# Patient Record
Sex: FEMALE | Race: BLACK OR AFRICAN AMERICAN | Employment: OTHER | URBAN - METROPOLITAN AREA
[De-identification: names, ages, dates, MRNs, and addresses within clinical notes are randomized per-mention and may not be internally consistent; named-entity substitution may affect disease eponyms.]

---

## 2020-12-01 ENCOUNTER — APPOINTMENT (OUTPATIENT)
Dept: GENERAL RADIOLOGY | Age: 75
DRG: 291 | End: 2020-12-01
Attending: EMERGENCY MEDICINE
Payer: MEDICARE

## 2020-12-01 ENCOUNTER — HOSPITAL ENCOUNTER (INPATIENT)
Age: 75
LOS: 9 days | Discharge: NURSING, REHAB OR LONG-TERM CARE | DRG: 291 | End: 2020-12-10
Attending: EMERGENCY MEDICINE | Admitting: INTERNAL MEDICINE
Payer: MEDICARE

## 2020-12-01 ENCOUNTER — APPOINTMENT (OUTPATIENT)
Dept: MRI IMAGING | Age: 75
DRG: 291 | End: 2020-12-01
Attending: INTERNAL MEDICINE
Payer: MEDICARE

## 2020-12-01 DIAGNOSIS — I16.0 HYPERTENSIVE URGENCY: Primary | ICD-10-CM

## 2020-12-01 DIAGNOSIS — I42.0 DILATED CARDIOMYOPATHY (HCC): ICD-10-CM

## 2020-12-01 DIAGNOSIS — R06.02 SOB (SHORTNESS OF BREATH): ICD-10-CM

## 2020-12-01 DIAGNOSIS — S92.505A CLOSED NONDISPLACED FRACTURE OF PHALANX OF LESSER TOE OF LEFT FOOT, UNSPECIFIED PHALANX, INITIAL ENCOUNTER: ICD-10-CM

## 2020-12-01 DIAGNOSIS — E87.70 HYPERVOLEMIA, UNSPECIFIED HYPERVOLEMIA TYPE: ICD-10-CM

## 2020-12-01 PROBLEM — L03.90 CELLULITIS: Status: ACTIVE | Noted: 2020-12-01

## 2020-12-01 LAB
ALBUMIN SERPL-MCNC: 3.2 G/DL (ref 3.5–5)
ALBUMIN/GLOB SERPL: 0.6 {RATIO} (ref 1.1–2.2)
ALP SERPL-CCNC: 108 U/L (ref 45–117)
ALT SERPL-CCNC: 19 U/L (ref 12–78)
ANION GAP SERPL CALC-SCNC: 7 MMOL/L (ref 5–15)
AST SERPL-CCNC: 18 U/L (ref 15–37)
ATRIAL RATE: 105 BPM
BASOPHILS # BLD: 0.1 K/UL (ref 0–0.1)
BASOPHILS NFR BLD: 1 % (ref 0–1)
BILIRUB SERPL-MCNC: 0.8 MG/DL (ref 0.2–1)
BNP SERPL-MCNC: 6517 PG/ML
BUN SERPL-MCNC: 24 MG/DL (ref 6–20)
BUN/CREAT SERPL: 18 (ref 12–20)
CALCIUM SERPL-MCNC: 8.6 MG/DL (ref 8.5–10.1)
CALCULATED P AXIS, ECG09: 36 DEGREES
CALCULATED R AXIS, ECG10: 3 DEGREES
CALCULATED T AXIS, ECG11: -105 DEGREES
CHLORIDE SERPL-SCNC: 103 MMOL/L (ref 97–108)
CO2 SERPL-SCNC: 30 MMOL/L (ref 21–32)
CREAT SERPL-MCNC: 1.35 MG/DL (ref 0.55–1.02)
DIAGNOSIS, 93000: NORMAL
DIFFERENTIAL METHOD BLD: ABNORMAL
EOSINOPHIL # BLD: 0.1 K/UL (ref 0–0.4)
EOSINOPHIL NFR BLD: 1 % (ref 0–7)
ERYTHROCYTE [DISTWIDTH] IN BLOOD BY AUTOMATED COUNT: 14.3 % (ref 11.5–14.5)
GLOBULIN SER CALC-MCNC: 5.1 G/DL (ref 2–4)
GLUCOSE SERPL-MCNC: 135 MG/DL (ref 65–100)
HCT VFR BLD AUTO: 33.2 % (ref 35–47)
HGB BLD-MCNC: 10.5 G/DL (ref 11.5–16)
IMM GRANULOCYTES # BLD AUTO: 0 K/UL (ref 0–0.04)
IMM GRANULOCYTES NFR BLD AUTO: 0 % (ref 0–0.5)
LYMPHOCYTES # BLD: 2 K/UL (ref 0.8–3.5)
LYMPHOCYTES NFR BLD: 20 % (ref 12–49)
MCH RBC QN AUTO: 28.8 PG (ref 26–34)
MCHC RBC AUTO-ENTMCNC: 31.6 G/DL (ref 30–36.5)
MCV RBC AUTO: 91 FL (ref 80–99)
MONOCYTES # BLD: 0.7 K/UL (ref 0–1)
MONOCYTES NFR BLD: 8 % (ref 5–13)
NEUTS SEG # BLD: 7 K/UL (ref 1.8–8)
NEUTS SEG NFR BLD: 71 % (ref 32–75)
NRBC # BLD: 0 K/UL (ref 0–0.01)
NRBC BLD-RTO: 0 PER 100 WBC
P-R INTERVAL, ECG05: 150 MS
PLATELET # BLD AUTO: 156 K/UL (ref 150–400)
POTASSIUM SERPL-SCNC: 2.8 MMOL/L (ref 3.5–5.1)
PROT SERPL-MCNC: 8.3 G/DL (ref 6.4–8.2)
Q-T INTERVAL, ECG07: 374 MS
QRS DURATION, ECG06: 90 MS
QTC CALCULATION (BEZET), ECG08: 494 MS
RBC # BLD AUTO: 3.65 M/UL (ref 3.8–5.2)
SODIUM SERPL-SCNC: 140 MMOL/L (ref 136–145)
TROPONIN I SERPL-MCNC: <0.05 NG/ML
VENTRICULAR RATE, ECG03: 105 BPM
WBC # BLD AUTO: 9.9 K/UL (ref 3.6–11)

## 2020-12-01 PROCEDURE — 74011250637 HC RX REV CODE- 250/637: Performed by: INTERNAL MEDICINE

## 2020-12-01 PROCEDURE — 96374 THER/PROPH/DIAG INJ IV PUSH: CPT

## 2020-12-01 PROCEDURE — 83880 ASSAY OF NATRIURETIC PEPTIDE: CPT

## 2020-12-01 PROCEDURE — 93005 ELECTROCARDIOGRAM TRACING: CPT

## 2020-12-01 PROCEDURE — 84484 ASSAY OF TROPONIN QUANT: CPT

## 2020-12-01 PROCEDURE — 74011250636 HC RX REV CODE- 250/636: Performed by: EMERGENCY MEDICINE

## 2020-12-01 PROCEDURE — 96375 TX/PRO/DX INJ NEW DRUG ADDON: CPT

## 2020-12-01 PROCEDURE — 80053 COMPREHEN METABOLIC PANEL: CPT

## 2020-12-01 PROCEDURE — 65660000000 HC RM CCU STEPDOWN

## 2020-12-01 PROCEDURE — 74011000258 HC RX REV CODE- 258: Performed by: INTERNAL MEDICINE

## 2020-12-01 PROCEDURE — 74011250637 HC RX REV CODE- 250/637: Performed by: EMERGENCY MEDICINE

## 2020-12-01 PROCEDURE — 71045 X-RAY EXAM CHEST 1 VIEW: CPT

## 2020-12-01 PROCEDURE — 85025 COMPLETE CBC W/AUTO DIFF WBC: CPT

## 2020-12-01 PROCEDURE — 74011250636 HC RX REV CODE- 250/636: Performed by: INTERNAL MEDICINE

## 2020-12-01 PROCEDURE — 73630 X-RAY EXAM OF FOOT: CPT

## 2020-12-01 PROCEDURE — 74011000258 HC RX REV CODE- 258: Performed by: EMERGENCY MEDICINE

## 2020-12-01 PROCEDURE — 99285 EMERGENCY DEPT VISIT HI MDM: CPT

## 2020-12-01 PROCEDURE — 36415 COLL VENOUS BLD VENIPUNCTURE: CPT

## 2020-12-01 PROCEDURE — A9575 INJ GADOTERATE MEGLUMI 0.1ML: HCPCS | Performed by: INTERNAL MEDICINE

## 2020-12-01 PROCEDURE — 74011000250 HC RX REV CODE- 250: Performed by: EMERGENCY MEDICINE

## 2020-12-01 PROCEDURE — 73720 MRI LWR EXTREMITY W/O&W/DYE: CPT

## 2020-12-01 RX ORDER — CLONIDINE HYDROCHLORIDE 0.1 MG/1
0.2 TABLET ORAL 2 TIMES DAILY
Status: DISCONTINUED | OUTPATIENT
Start: 2020-12-01 | End: 2020-12-03

## 2020-12-01 RX ORDER — POTASSIUM CHLORIDE 20 MEQ/1
20 TABLET, EXTENDED RELEASE ORAL DAILY
Status: DISCONTINUED | OUTPATIENT
Start: 2020-12-02 | End: 2020-12-10 | Stop reason: HOSPADM

## 2020-12-01 RX ORDER — ACETAMINOPHEN 325 MG/1
650 TABLET ORAL
Status: DISCONTINUED | OUTPATIENT
Start: 2020-12-01 | End: 2020-12-10 | Stop reason: HOSPADM

## 2020-12-01 RX ORDER — POLYETHYLENE GLYCOL 3350 17 G/17G
17 POWDER, FOR SOLUTION ORAL DAILY PRN
Status: DISCONTINUED | OUTPATIENT
Start: 2020-12-01 | End: 2020-12-10 | Stop reason: HOSPADM

## 2020-12-01 RX ORDER — GADOTERATE MEGLUMINE 376.9 MG/ML
11 INJECTION INTRAVENOUS
Status: COMPLETED | OUTPATIENT
Start: 2020-12-01 | End: 2020-12-01

## 2020-12-01 RX ORDER — SODIUM CHLORIDE 0.9 % (FLUSH) 0.9 %
5-40 SYRINGE (ML) INJECTION AS NEEDED
Status: DISCONTINUED | OUTPATIENT
Start: 2020-12-01 | End: 2020-12-10 | Stop reason: HOSPADM

## 2020-12-01 RX ORDER — ONDANSETRON 2 MG/ML
4 INJECTION INTRAMUSCULAR; INTRAVENOUS
Status: DISCONTINUED | OUTPATIENT
Start: 2020-12-01 | End: 2020-12-10 | Stop reason: HOSPADM

## 2020-12-01 RX ORDER — HEPARIN SODIUM 5000 [USP'U]/ML
5000 INJECTION, SOLUTION INTRAVENOUS; SUBCUTANEOUS EVERY 12 HOURS
Status: DISCONTINUED | OUTPATIENT
Start: 2020-12-01 | End: 2020-12-10 | Stop reason: HOSPADM

## 2020-12-01 RX ORDER — ACETAMINOPHEN 325 MG/1
650 TABLET ORAL
Status: DISCONTINUED | OUTPATIENT
Start: 2020-12-01 | End: 2020-12-04

## 2020-12-01 RX ORDER — LABETALOL HYDROCHLORIDE 5 MG/ML
20 INJECTION, SOLUTION INTRAVENOUS
Status: DISCONTINUED | OUTPATIENT
Start: 2020-12-01 | End: 2020-12-01

## 2020-12-01 RX ORDER — FUROSEMIDE 10 MG/ML
40 INJECTION INTRAMUSCULAR; INTRAVENOUS DAILY
Status: DISCONTINUED | OUTPATIENT
Start: 2020-12-02 | End: 2020-12-03

## 2020-12-01 RX ORDER — POTASSIUM CHLORIDE 20 MEQ/1
40 TABLET, EXTENDED RELEASE ORAL
Status: COMPLETED | OUTPATIENT
Start: 2020-12-01 | End: 2020-12-01

## 2020-12-01 RX ORDER — HYDRALAZINE HYDROCHLORIDE 20 MG/ML
10 INJECTION INTRAMUSCULAR; INTRAVENOUS
Status: DISCONTINUED | OUTPATIENT
Start: 2020-12-01 | End: 2020-12-03

## 2020-12-01 RX ORDER — LABETALOL HYDROCHLORIDE 5 MG/ML
10 INJECTION, SOLUTION INTRAVENOUS
Status: COMPLETED | OUTPATIENT
Start: 2020-12-01 | End: 2020-12-01

## 2020-12-01 RX ORDER — METRONIDAZOLE 500 MG/100ML
500 INJECTION, SOLUTION INTRAVENOUS EVERY 12 HOURS
Status: DISCONTINUED | OUTPATIENT
Start: 2020-12-01 | End: 2020-12-06

## 2020-12-01 RX ORDER — SODIUM CHLORIDE 0.9 % (FLUSH) 0.9 %
5-40 SYRINGE (ML) INJECTION EVERY 8 HOURS
Status: DISCONTINUED | OUTPATIENT
Start: 2020-12-01 | End: 2020-12-10 | Stop reason: HOSPADM

## 2020-12-01 RX ADMIN — METRONIDAZOLE 500 MG: 500 INJECTION, SOLUTION INTRAVENOUS at 23:57

## 2020-12-01 RX ADMIN — LABETALOL HYDROCHLORIDE 10 MG: 5 INJECTION INTRAVENOUS at 12:41

## 2020-12-01 RX ADMIN — HYDRALAZINE HYDROCHLORIDE 10 MG: 20 INJECTION INTRAMUSCULAR; INTRAVENOUS at 18:42

## 2020-12-01 RX ADMIN — GADOTERATE MEGLUMINE 11 ML: 376.9 INJECTION INTRAVENOUS at 21:31

## 2020-12-01 RX ADMIN — POTASSIUM CHLORIDE 40 MEQ: 20 TABLET, EXTENDED RELEASE ORAL at 14:57

## 2020-12-01 RX ADMIN — ACETAMINOPHEN 650 MG: 325 TABLET ORAL at 17:45

## 2020-12-01 RX ADMIN — CLONIDINE HYDROCHLORIDE 0.2 MG: 0.1 TABLET ORAL at 17:40

## 2020-12-01 RX ADMIN — CEFAZOLIN SODIUM 1 G: 1 INJECTION, POWDER, FOR SOLUTION INTRAMUSCULAR; INTRAVENOUS at 14:57

## 2020-12-01 RX ADMIN — CEFEPIME HYDROCHLORIDE 2 G: 2 INJECTION, POWDER, FOR SOLUTION INTRAVENOUS at 17:47

## 2020-12-01 RX ADMIN — HEPARIN SODIUM 5000 UNITS: 5000 INJECTION INTRAVENOUS; SUBCUTANEOUS at 22:50

## 2020-12-01 RX ADMIN — Medication 10 ML: at 22:50

## 2020-12-01 NOTE — H&P
Hospitalist Admission Note    NAME: Gelacio Vidal   :  1945   MRN:  [id number]     Date/Time:  2020 4:29 PM    Patient PCP: None  ______________________________________________________________________  Given the patient's current clinical presentation, I have a high level of concern for decompensation if discharged from the emergency department. Complex decision making was performed, which includes reviewing the patient's available past medical records, laboratory results, and x-ray films. My assessment of this patient's clinical condition and my plan of care is as follows. Assessment / Plan:  Bilateral extremity purulent cellulitis  -Admit to telemetry  -Received a dose of cefazolin in the ED.   Start broad-spectrum coverage with vancomycin, cefepime, metronidazole  -Left foot x-ray shows nondisplaced left second proximal phalanx fracture in the toe has blue/black discoloration  -No radiographic evidence of osteomyelitis but will order an MRI of the foot and asked for podiatry consult  -We will also ask wound care to see to help address the draining wound on her Rt foot  -check A1C, check CARISSA    SOB/volume overload  -not on home O2  -denies history of tobacco use or known lung disease  -CXR Rt pleural effusion  -BNP > 6K  -start daily lasix and add potassium supplement  -daily weights, strict I/O  -denies any chest pain    Accelerated HTN  -initial /109  -improved after IV labetalol but still significantly elevated with DBP > 100  -start clonidine, prn hydralazine  -follow BP trend, may need another agent for optimal control    hypokalemia  -given 40meq of potassium in ED for initial K of 2.8  -recheck AM labs    TONJA vs CKD  -Cr 1.35, unknown baseline  -recheck AM labs  -check A1C as above  -will hold off on IV for now given b/l LE edema    Code Status: Full  Surrogate Decision Maker:    DVT Prophylaxis: heparin  GI Prophylaxis: not indicated    Baseline: lives at home with her 2 sons. Independent with ADLs      Subjective:   CHIEF COMPLAINT: bilateral LE swelling, draining Rt foot wound, b/l foot pain. HISTORY OF PRESENT ILLNESS:     Zena Mclain is a 66 y.o. is pleasant -American female with no known past medical history aside from recent bilateral lower extremity cellulitis treated [facility] several weeks ago, who presents to the ED today with bilateral lower extremity swelling, and an ulcer with purulent drainage from her right foot. She was initially seen at an urgent care facility and was then referred to the ED. She does have pain in left foot near the site of a draining ulcer on the dorsum of her forefoot. She reports she was in the hospital for several days at [facility] a few weeks ago during which time she was treated with IV antibiotics which greatly improved bilateral lower extremity cellulitis she was experiencing at the time. However, patient notes that over the last few weeks swelling in her legs has recurred with a draining wound in the right foot and skin breakdown on both extremities. Her systolic blood pressure is greater than 240 on presentation. BP improved but still elevated at 171/105 after IV labetalol in the ED    X-ray of the left foot in the ED reveals nondisplaced fracture of the left second proximal phalanx distally with no plain film evidence for osteomyelitis. Moderate soft tissue swelling compatible with cellulitis is noted. Patient denies fever/chills or nausea/vomiting. The patient lives at home with her 2 sons and is independent in her ADLs. She is retired as a  at [employer]. We were asked to admit for work up and evaluation of the above problems.      Denies past medical history    Denies past surgical history    Social History     Tobacco Use    Smoking status: Not on file   Substance Use Topics    Alcohol use: Not on file        Family History   Problem Relation Age of Onset    Diabetes Mother     Diabetes Father     Diabetes Son        Prior to Admission medications    Not on File       REVIEW OF SYSTEMS:     I am not able to complete the review of systems because: The patient is intubated and sedated    The patient has altered mental status due to his acute medical problems    The patient has baseline aphasia from prior stroke(s)    The patient has baseline dementia and is not reliable historian    The patient is in acute medical distress and unable to provide information           Total of 12 systems reviewed as follows:       POSITIVE= underlined text  Negative = text not underlined  General:  fever, chills, sweats, generalized weakness, weight loss/gain,      loss of appetite   Eyes:    blurred vision, eye pain, loss of vision, double vision  ENT:    rhinorrhea, pharyngitis   Respiratory:   cough, sputum production, SOB, LACY, wheezing, pleuritic pain   Cardiology:   chest pain, palpitations, orthopnea, PND, edema, syncope   Gastrointestinal:  abdominal pain , N/V, diarrhea, dysphagia, constipation, bleeding   Genitourinary:  frequency, urgency, dysuria, hematuria, incontinence   Muskuloskeletal :  arthralgia, myalgia, back pain  Hematology:  easy bruising, nose or gum bleeding, lymphadenopathy   Dermatological: rash, ulceration, pruritis, color change / jaundice  Endocrine:   hot flashes or polydipsia   Neurological:  headache, dizziness, confusion, focal weakness, paresthesia,     Speech difficulties, memory loss, gait difficulty  Psychological: Feelings of anxiety, depression, agitation    Objective:   VITALS:    Visit Vitals  BP (!) 187/93   Pulse 79   Temp 98.5 °F (36.9 °C)   Resp 27   Ht 5' 4\" (1.626 m)   Wt 52.2 kg (115 lb)   SpO2 94%   BMI 19.74 kg/m²       PHYSICAL EXAM:    General:    Alert, cooperative, no distress, appears stated age.      HEENT: Atraumatic, anicteric sclerae, pink conjunctivae     No oral ulcers, mucosa moist, throat clear, dentition fair  Neck:  Supple, symmetrical,  thyroid: non tender  Lungs:   Clear to auscultation bilaterally. No Wheezing or Rhonchi. No rales. Chest wall:  No tenderness  No Accessory muscle use. Heart:   Regular  rhythm,  No  murmur   No edema  Abdomen:   Soft, non-tender. Not distended. Bowel sounds normal  Extremities: No cyanosis. No clubbing,      Skin turgor normal, Capillary refill normal, Radial dial pulse 2+  Skin:     Not pale. Not Jaundiced. B/l lower extremity discoloration and dry skin. Ulcerated lesion Rt dorsal forefoot. Blue/black discoloration of Lt 2nd toe  Psych:  Good insight. Not depressed. Not anxious or agitated. Neurologic: EOMs intact. No facial asymmetry. No aphasia or slurred speech. Symmetrical strength, Sensation grossly intact. Alert and oriented X 4. Media Information      Document Information         _______________________________________________________________________  Care Plan discussed with:    Comments   Patient x    Family      RN     Care Manager                    Consultant:  donald WALLACE MD   _______________________________________________________________________  Expected  Disposition:   Home with Family x   HH/PT/OT/RN x   SNF/LTC    NORMAN    ________________________________________________________________________  TOTAL TIME: 76 Minutes    Critical Care Provided     Minutes non procedure based      Comments     Reviewed previous records   >50% of visit spent in counseling and coordination of care  Discussion with patient and/or family and questions answered       ________________________________________________________________________  Signed: Nubia Pendleton DO    Procedures: see electronic medical records for all procedures/Xrays and details which were not copied into this note but were reviewed prior to creation of Plan.     LAB DATA REVIEWED:    Recent Results (from the past 24 hour(s))   EKG, 12 LEAD, INITIAL    Collection Time: 12/01/20 11:26 AM   Result Value Ref Range    Ventricular Rate 105 BPM Atrial Rate 105 BPM    P-R Interval 150 ms    QRS Duration 90 ms    Q-T Interval 374 ms    QTC Calculation (Bezet) 494 ms    Calculated P Axis 36 degrees    Calculated R Axis 3 degrees    Calculated T Axis -105 degrees    Diagnosis       Sinus tachycardia  Possible Left atrial enlargement  Left ventricular hypertrophy  ST & T wave abnormality, consider lateral ischemia  No previous ECGs available  Confirmed by Tanesha Peterson MD, Enma Krause ([id number]) on 12/1/2020 2:00:00 PM     CBC WITH AUTOMATED DIFF    Collection Time: 12/01/20 11:30 AM   Result Value Ref Range    WBC 9.9 3.6 - 11.0 K/uL    RBC 3.65 (L) 3.80 - 5.20 M/uL    HGB 10.5 (L) 11.5 - 16.0 g/dL    HCT 33.2 (L) 35.0 - 47.0 %    MCV 91.0 80.0 - 99.0 FL    MCH 28.8 26.0 - 34.0 PG    MCHC 31.6 30.0 - 36.5 g/dL    RDW 14.3 11.5 - 14.5 %    PLATELET 932 666 - 284 K/uL    NRBC 0.0 0  WBC    ABSOLUTE NRBC 0.00 0.00 - 0.01 K/uL    NEUTROPHILS 71 32 - 75 %    LYMPHOCYTES 20 12 - 49 %    MONOCYTES 8 5 - 13 %    EOSINOPHILS 1 0 - 7 %    BASOPHILS 1 0 - 1 %    IMMATURE GRANULOCYTES 0 0.0 - 0.5 %    ABS. NEUTROPHILS 7.0 1.8 - 8.0 K/UL    ABS. LYMPHOCYTES 2.0 0.8 - 3.5 K/UL    ABS. MONOCYTES 0.7 0.0 - 1.0 K/UL    ABS. EOSINOPHILS 0.1 0.0 - 0.4 K/UL    ABS. BASOPHILS 0.1 0.0 - 0.1 K/UL    ABS. IMM. GRANS. 0.0 0.00 - 0.04 K/UL    DF AUTOMATED     METABOLIC PANEL, COMPREHENSIVE    Collection Time: 12/01/20 11:30 AM   Result Value Ref Range    Sodium 140 136 - 145 mmol/L    Potassium 2.8 (L) 3.5 - 5.1 mmol/L    Chloride 103 97 - 108 mmol/L    CO2 30 21 - 32 mmol/L    Anion gap 7 5 - 15 mmol/L    Glucose 135 (H) 65 - 100 mg/dL    BUN 24 (H) 6 - 20 MG/DL    Creatinine 1.35 (H) 0.55 - 1.02 MG/DL    BUN/Creatinine ratio 18 12 - 20      GFR est AA 46 (L) >60 ml/min/1.73m2    GFR est non-AA 38 (L) >60 ml/min/1.73m2    Calcium 8.6 8.5 - 10.1 MG/DL    Bilirubin, total 0.8 0.2 - 1.0 MG/DL    ALT (SGPT) 19 12 - 78 U/L    AST (SGOT) 18 15 - 37 U/L    Alk.  phosphatase 108 45 - 117 U/L Protein, total 8.3 (H) 6.4 - 8.2 g/dL    Albumin 3.2 (L) 3.5 - 5.0 g/dL    Globulin 5.1 (H) 2.0 - 4.0 g/dL    A-G Ratio 0.6 (L) 1.1 - 2.2     NT-PRO BNP    Collection Time: 12/01/20 11:30 AM   Result Value Ref Range    NT pro-BNP 6,517 (H) <450 PG/ML   TROPONIN I    Collection Time: 12/01/20 11:30 AM   Result Value Ref Range    Troponin-I, Qt. <0.05 <0.05 ng/mL

## 2020-12-01 NOTE — ED NOTES
TRANSFER - OUT REPORT:    Verbal report given to Mary Richmond RN (name) on Carrol Guerra  being transferred to gen surg(unit) for routine progression of care       Report consisted of patients Situation, Background, Assessment and   Recommendations(SBAR). Information from the following report(s) SBAR, Kardex, ED Summary, Intake/Output, MAR, Recent Results and Med Rec Status was reviewed with the receiving nurse. Lines:   Peripheral IV 12/01/20 (Active)        Opportunity for questions and clarification was provided.       Patient transported with:   [employer]

## 2020-12-01 NOTE — ROUTINE PROCESS
-Please complete MRI History and Safety Screening Form for this patient using [facility] only under Orders Requiring a Screening Form: 
 

## 2020-12-01 NOTE — ED PROVIDER NOTES
EMERGENCY DEPARTMENT HISTORY AND PHYSICAL EXAM      Date: 12/1/2020  Patient Name: Laverta Lesches    History of Presenting Illness     Chief Complaint   Patient presents with    Shortness of Breath     Pt arrived to ED from pt 1st SOB and bilateral leg swelling x 3 months. History Provided By: Patient    HPI: Laverta Lesches, 66 y.o. female presents to the ED with cc of leg swelling and shortness of breath. Patient reports was treated for in past for leg swelling, was treated at [facility] but reports it returned. Patient reports leg swelling developed 2 weeks ago, reports pain in feet. Denies any new medications, used to be on blood pressure medicine, but currently not taking. Patient reports shortness of breath and feet swelling prompting visit to ED. Shortness of breath began 2 weeks ago, worse today. No chest pain, faint cough, non-productive. No fevers. No orthopnea, no PND. BP in triage in 240s, normally states BP has been controlled. Went to urgent care prior to arrival in the ED and was referred here. There are no other complaints, changes, or physical findings at this time. PCP: None    No current facility-administered medications on file prior to encounter. No current outpatient medications on file prior to encounter. Past History     Past Medical History:    \"Leg swelling\"  Pre-diabetes    Past Surgical History:    Denies    Family History:    Reviewed and non-contributory    Social History:  Social History     Tobacco Use    Smoking status: Not on file   Substance Use Topics    Alcohol use: Not on file    Drug use: Not on file       Allergies:  No Known Allergies      Review of Systems   Review of Systems   Constitutional: Negative for activity change, chills and fever. HENT: Negative for facial swelling and voice change. Eyes: Negative for redness. Respiratory: Positive for cough and shortness of breath. Negative for wheezing.     Cardiovascular: Positive for leg swelling. Negative for chest pain. Gastrointestinal: Negative for abdominal pain, diarrhea, nausea and vomiting. Genitourinary: Negative for decreased urine volume. Musculoskeletal: Negative for gait problem. Skin: Negative for pallor and rash. Neurological: Negative for tremors and facial asymmetry. Psychiatric/Behavioral: Negative for agitation. All other systems reviewed and are negative. Physical Exam   Physical Exam  Vitals signs and nursing note reviewed. Constitutional:       Comments: 66-year-old female, resting in bed, no acute distress   HENT:      Head: Normocephalic and atraumatic. Neck:      Musculoskeletal: Normal range of motion. Cardiovascular:      Rate and Rhythm: Normal rate and regular rhythm. Pulses:           Radial pulses are 2+ on the right side and 2+ on the left side. Dorsalis pedis pulses are 2+ on the right side and 2+ on the left side. Posterior tibial pulses are 2+ on the right side and 2+ on the left side. Heart sounds: No murmur. No friction rub. No gallop. Pulmonary:      Effort: Pulmonary effort is normal.      Breath sounds: Normal breath sounds. Comments: Not hypoxic on room air  Abdominal:      Palpations: Abdomen is soft. Tenderness: There is no abdominal tenderness. Musculoskeletal: Normal range of motion. Comments: Over the bilateral lower extremities there are thick skin changes. There are superficial ulcerations of the lateral right leg. Over the left second toe there is ulceration with overlying skin change and discharge and odor. Skin:     General: Skin is warm. Capillary Refill: Capillary refill takes less than 2 seconds. Neurological:      General: No focal deficit present. Mental Status: She is alert.    Psychiatric:         Mood and Affect: Mood normal.         Diagnostic Study Results     Labs -     Recent Results (from the past 12 hour(s))   EKG, 12 LEAD, INITIAL    Collection Time: 12/01/20 11:26 AM   Result Value Ref Range    Ventricular Rate 105 BPM    Atrial Rate 105 BPM    P-R Interval 150 ms    QRS Duration 90 ms    Q-T Interval 374 ms    QTC Calculation (Bezet) 494 ms    Calculated P Axis 36 degrees    Calculated R Axis 3 degrees    Calculated T Axis -105 degrees    Diagnosis       Sinus tachycardia  Possible Left atrial enlargement  Left ventricular hypertrophy  ST & T wave abnormality, consider lateral ischemia  No previous ECGs available  Confirmed by Branden Rodriguez MD, Marce Waddell ([id number]) on 12/1/2020 2:00:00 PM     CBC WITH AUTOMATED DIFF    Collection Time: 12/01/20 11:30 AM   Result Value Ref Range    WBC 9.9 3.6 - 11.0 K/uL    RBC 3.65 (L) 3.80 - 5.20 M/uL    HGB 10.5 (L) 11.5 - 16.0 g/dL    HCT 33.2 (L) 35.0 - 47.0 %    MCV 91.0 80.0 - 99.0 FL    MCH 28.8 26.0 - 34.0 PG    MCHC 31.6 30.0 - 36.5 g/dL    RDW 14.3 11.5 - 14.5 %    PLATELET 647 798 - 368 K/uL    NRBC 0.0 0  WBC    ABSOLUTE NRBC 0.00 0.00 - 0.01 K/uL    NEUTROPHILS 71 32 - 75 %    LYMPHOCYTES 20 12 - 49 %    MONOCYTES 8 5 - 13 %    EOSINOPHILS 1 0 - 7 %    BASOPHILS 1 0 - 1 %    IMMATURE GRANULOCYTES 0 0.0 - 0.5 %    ABS. NEUTROPHILS 7.0 1.8 - 8.0 K/UL    ABS. LYMPHOCYTES 2.0 0.8 - 3.5 K/UL    ABS. MONOCYTES 0.7 0.0 - 1.0 K/UL    ABS. EOSINOPHILS 0.1 0.0 - 0.4 K/UL    ABS. BASOPHILS 0.1 0.0 - 0.1 K/UL    ABS. IMM.  GRANS. 0.0 0.00 - 0.04 K/UL    DF AUTOMATED     METABOLIC PANEL, COMPREHENSIVE    Collection Time: 12/01/20 11:30 AM   Result Value Ref Range    Sodium 140 136 - 145 mmol/L    Potassium 2.8 (L) 3.5 - 5.1 mmol/L    Chloride 103 97 - 108 mmol/L    CO2 30 21 - 32 mmol/L    Anion gap 7 5 - 15 mmol/L    Glucose 135 (H) 65 - 100 mg/dL    BUN 24 (H) 6 - 20 MG/DL    Creatinine 1.35 (H) 0.55 - 1.02 MG/DL    BUN/Creatinine ratio 18 12 - 20      GFR est AA 46 (L) >60 ml/min/1.73m2    GFR est non-AA 38 (L) >60 ml/min/1.73m2    Calcium 8.6 8.5 - 10.1 MG/DL    Bilirubin, total 0.8 0.2 - 1.0 MG/DL    ALT (SGPT) 19 12 - 78 U/L AST (SGOT) 18 15 - 37 U/L    Alk. phosphatase 108 45 - 117 U/L    Protein, total 8.3 (H) 6.4 - 8.2 g/dL    Albumin 3.2 (L) 3.5 - 5.0 g/dL    Globulin 5.1 (H) 2.0 - 4.0 g/dL    A-G Ratio 0.6 (L) 1.1 - 2.2     NT-PRO BNP    Collection Time: 12/01/20 11:30 AM   Result Value Ref Range    NT pro-BNP 6,517 (H) <450 PG/ML   TROPONIN I    Collection Time: 12/01/20 11:30 AM   Result Value Ref Range    Troponin-I, Qt. <0.05 <0.05 ng/mL       Radiologic Studies -   XR FOOT LT MIN 3 V   Final Result   IMPRESSION:       Nondisplaced fracture of the left second proximal phalanx distally. No plain film evidence for osteomyelitis at this time. Moderate soft tissue   swelling compatible with cellulitis. XR CHEST SNGL V   Final Result   IMPRESSION: Right pleural effusion with basilar air space disease        CT Results  (Last 48 hours)    None        CXR Results  (Last 48 hours)               12/01/20 1251  XR CHEST SNGL V Final result    Impression:  IMPRESSION: Right pleural effusion with basilar air space disease       Narrative:  EXAM: XR CHEST SNGL V       INDICATION: shortness of breath       COMPARISON: None. FINDINGS: A portable AP radiograph of the chest was obtained at 1236 hours. The   patient is on a cardiac monitor. There is a moderate right pleural effusion with   associated air space disease. The cardiac and mediastinal contours and pulmonary   vascularity are normal.  The bones and soft tissues are grossly within normal   limits. Medical Decision Making   I am the first provider for this patient. I reviewed the vital signs, available nursing notes, past medical history, past surgical history, family history and social history. Vital Signs-Reviewed the patient's vital signs.   Patient Vitals for the past 12 hrs:   Temp Pulse Resp BP SpO2   12/01/20 1430  82 19 (!) 184/106 93 %   12/01/20 1330  76 26 (!) 189/99 92 %   12/01/20 1316  74 22 (!) 173/94 93 %   12/01/20 1300  79 28 (!) 191/109 93 %   12/01/20 1230  98 25 (!) 239/132 96 %   12/01/20 1119 98.5 °F (36.9 °C) (!) 106 20 (!) 247/109        Records Reviewed: Nursing Notes    Provider Notes (Medical Decision Making):     64-year-old female who appears had not sought care in some time presents with 2 weeks of shortness of breath and leg swelling. Vital signs are notable for significant hypertension at triage. Regarding patient's symptoms, patient certainly could be fluid overloaded. We will give 10 mg of labetalol has a must consider malignant hypertension. Check basic labs including renal function. Check troponin and BNP. EKG shows ST depressions, no prior to compare to. No chest pain. On exam, patient's leg swelling appears to be more chronic, doubt DVT given symmetrical nature. Interestingly there is an ulcer in the patient's left toe which does appear to be infected. Patient reports she \"stubbed it. \" Will check plain film. Abx. ED Course:   Initial assessment performed. The patients presenting problems have been discussed, and they are in agreement with the care plan formulated and outlined with them. I have encouraged them to ask questions as they arise throughout their visit. ED Course as of Dec 01 1600   Tue Dec 01, 2020   1240 EKG interpreted by me. Shows ST with a HR of 105. There are T-wave inversion in the lateral leads with sub 1 mm depressions. Normal intervals. [MB]   6092 Patient's labs show a creatinine of 1.35. Her potassium is low, her BNP is elevated she has a pleural effusion on x-ray. There is anemia noted as well, troponin is negative. Blood pressure improved with labetalol. [MB]   1410 There is a fracture of the phalanx, but no evidence of osteomyelitis. We will give antibiotics. [MB]   0070 Discussed with Dr. Madi Arias, hospitalist for admission, patient and family updated.     [MB]      ED Course User Index  [MB] MD Sarah Davis, MD      Disposition:    Admitted      Diagnosis     Clinical Impression:   1. Hypertensive urgency    2. Hypervolemia, unspecified hypervolemia type    3. Closed nondisplaced fracture of phalanx of lesser toe of left foot, unspecified phalanx, initial encounter    4. SOB (shortness of breath)        Attestations:    Daniel Lake MD    Please note that this dictation was completed with ShareWithU, the computer voice recognition software. Quite often unanticipated grammatical, syntax, homophones, and other interpretive errors are inadvertently transcribed by the computer software. Please disregard these errors. Please excuse any errors that have escaped final proofreading. Thank you.

## 2020-12-02 ENCOUNTER — APPOINTMENT (OUTPATIENT)
Dept: NON INVASIVE DIAGNOSTICS | Age: 75
DRG: 291 | End: 2020-12-02
Attending: INTERNAL MEDICINE
Payer: MEDICARE

## 2020-12-02 ENCOUNTER — APPOINTMENT (OUTPATIENT)
Dept: VASCULAR SURGERY | Age: 75
DRG: 291 | End: 2020-12-02
Attending: INTERNAL MEDICINE
Payer: MEDICARE

## 2020-12-02 LAB
ANION GAP SERPL CALC-SCNC: 5 MMOL/L (ref 5–15)
BUN SERPL-MCNC: 25 MG/DL (ref 6–20)
BUN/CREAT SERPL: 20 (ref 12–20)
CALCIUM SERPL-MCNC: 8.8 MG/DL (ref 8.5–10.1)
CHLORIDE SERPL-SCNC: 107 MMOL/L (ref 97–108)
CO2 SERPL-SCNC: 31 MMOL/L (ref 21–32)
CREAT SERPL-MCNC: 1.23 MG/DL (ref 0.55–1.02)
ECHO AO ROOT DIAM: 2.67 CM
ECHO AV AREA PEAK VELOCITY: 2.46 CM2
ECHO AV AREA VTI: 3.18 CM2
ECHO AV AREA/BSA PEAK VELOCITY: 1.6 CM2/M2
ECHO AV AREA/BSA VTI: 2.1 CM2/M2
ECHO AV MEAN GRADIENT: 2.09 MMHG
ECHO AV PEAK GRADIENT: 4.42 MMHG
ECHO AV PEAK VELOCITY: 105.1 CM/S
ECHO AV VTI: 17.3 CM
ECHO EST RA PRESSURE: 10 MMHG
ECHO LA AREA 4C: 13.76 CM2
ECHO LA MAJOR AXIS: 3.15 CM
ECHO LA MINOR AXIS: 2.04 CM
ECHO LA VOL 4C: 26.6 ML (ref 22–52)
ECHO LA VOLUME INDEX A4C: 17.2 ML/M2 (ref 16–28)
ECHO LV E' LATERAL VELOCITY: 5.08 CM/S
ECHO LV E' SEPTAL VELOCITY: 4.11 CM/S
ECHO LV EDV A4C: 125.84 ML
ECHO LV EDV INDEX A4C: 81.4 ML/M2
ECHO LV EJECTION FRACTION A4C: 35 PERCENT
ECHO LV ESV A4C: 82 ML
ECHO LV ESV INDEX A4C: 53 ML/M2
ECHO LV INTERNAL DIMENSION DIASTOLIC MMODE: 4.72 CM
ECHO LV INTERNAL DIMENSION DIASTOLIC: 3.97 CM (ref 3.9–5.3)
ECHO LV INTERNAL DIMENSION SYSTOLIC MMODE: 3.68 CM
ECHO LV INTERNAL DIMENSION SYSTOLIC: 3.47 CM
ECHO LV IVSD: 1.01 CM (ref 0.6–0.9)
ECHO LV MASS 2D: 128.3 G (ref 67–162)
ECHO LV MASS INDEX 2D: 83 G/M2 (ref 43–95)
ECHO LV POSTERIOR WALL DIASTOLIC: 1.02 CM (ref 0.6–0.9)
ECHO LVOT CARDIAC OUTPUT: 4.05 LITER/MINUTE
ECHO LVOT DIAM: 2.03 CM
ECHO LVOT PEAK GRADIENT: 2.57 MMHG
ECHO LVOT PEAK VELOCITY: 80.12 CM/S
ECHO LVOT SV: 55 ML
ECHO LVOT VTI: 17.06 CM
ECHO MV A VELOCITY: 80.38 CM/S
ECHO MV AREA PHT: 2.89 CM2
ECHO MV AREA VTI: 2.17 CM2
ECHO MV E DECELERATION TIME (DT): 262.11 MS
ECHO MV E VELOCITY: 80.38 CM/S
ECHO MV E/A RATIO: 1
ECHO MV E/E' LATERAL: 15.82
ECHO MV E/E' RATIO (AVERAGED): 17.69
ECHO MV E/E' SEPTAL: 19.56
ECHO MV MAX VELOCITY: 109.77 CM/S
ECHO MV MEAN GRADIENT: 2.44 MMHG
ECHO MV PEAK GRADIENT: 4.82 MMHG
ECHO MV PRESSURE HALF TIME (PHT): 76.02 MS
ECHO MV VTI: 25.41 CM
ECHO PV MAX VELOCITY: 63.71 CM/S
ECHO PV MEAN GRADIENT: 0.79 MMHG
ECHO PV PEAK INSTANTANEOUS GRADIENT SYSTOLIC: 1.62 MMHG
ECHO PV VTI: 11.95 CM
ECHO RIGHT VENTRICULAR SYSTOLIC PRESSURE (RVSP): 45.02 MMHG
ECHO TV REGURGITANT MAX VELOCITY: 295.31 CM/S
ECHO TV REGURGITANT MAX VELOCITY: 488.38 CM/S
ECHO TV REGURGITANT PEAK GRADIENT: 35.02 MMHG
ERYTHROCYTE [DISTWIDTH] IN BLOOD BY AUTOMATED COUNT: 14.3 % (ref 11.5–14.5)
EST. AVERAGE GLUCOSE BLD GHB EST-MCNC: 123 MG/DL
GLUCOSE SERPL-MCNC: 121 MG/DL (ref 65–100)
HBA1C MFR BLD: 5.9 % (ref 4–5.6)
HCT VFR BLD AUTO: 30.9 % (ref 35–47)
HGB BLD-MCNC: 9.8 G/DL (ref 11.5–16)
LVOT MG: 1.42 MMHG
MCH RBC QN AUTO: 28.7 PG (ref 26–34)
MCHC RBC AUTO-ENTMCNC: 31.7 G/DL (ref 30–36.5)
MCV RBC AUTO: 90.4 FL (ref 80–99)
NRBC # BLD: 0 K/UL (ref 0–0.01)
NRBC BLD-RTO: 0 PER 100 WBC
PLATELET # BLD AUTO: 136 K/UL (ref 150–400)
POTASSIUM SERPL-SCNC: 2.9 MMOL/L (ref 3.5–5.1)
PROCALCITONIN SERPL-MCNC: <0.05 NG/ML
RBC # BLD AUTO: 3.42 M/UL (ref 3.8–5.2)
SODIUM SERPL-SCNC: 143 MMOL/L (ref 136–145)
WBC # BLD AUTO: 8.9 K/UL (ref 3.6–11)

## 2020-12-02 PROCEDURE — 80048 BASIC METABOLIC PNL TOTAL CA: CPT

## 2020-12-02 PROCEDURE — 74011250636 HC RX REV CODE- 250/636: Performed by: INTERNAL MEDICINE

## 2020-12-02 PROCEDURE — 93306 TTE W/DOPPLER COMPLETE: CPT

## 2020-12-02 PROCEDURE — 84145 PROCALCITONIN (PCT): CPT

## 2020-12-02 PROCEDURE — 83036 HEMOGLOBIN GLYCOSYLATED A1C: CPT

## 2020-12-02 PROCEDURE — 87205 SMEAR GRAM STAIN: CPT

## 2020-12-02 PROCEDURE — 65660000000 HC RM CCU STEPDOWN

## 2020-12-02 PROCEDURE — 87077 CULTURE AEROBIC IDENTIFY: CPT

## 2020-12-02 PROCEDURE — 93922 UPR/L XTREMITY ART 2 LEVELS: CPT

## 2020-12-02 PROCEDURE — 74011250637 HC RX REV CODE- 250/637: Performed by: INTERNAL MEDICINE

## 2020-12-02 PROCEDURE — 74011000258 HC RX REV CODE- 258: Performed by: INTERNAL MEDICINE

## 2020-12-02 PROCEDURE — 87186 SC STD MICRODIL/AGAR DIL: CPT

## 2020-12-02 PROCEDURE — 85027 COMPLETE CBC AUTOMATED: CPT

## 2020-12-02 PROCEDURE — 36415 COLL VENOUS BLD VENIPUNCTURE: CPT

## 2020-12-02 RX ORDER — MUPIROCIN 20 MG/G
OINTMENT TOPICAL DAILY
Status: DISCONTINUED | OUTPATIENT
Start: 2020-12-03 | End: 2020-12-10 | Stop reason: HOSPADM

## 2020-12-02 RX ORDER — CARVEDILOL 3.12 MG/1
3.12 TABLET ORAL 2 TIMES DAILY WITH MEALS
Status: DISCONTINUED | OUTPATIENT
Start: 2020-12-02 | End: 2020-12-03

## 2020-12-02 RX ORDER — POTASSIUM CHLORIDE 20 MEQ/1
40 TABLET, EXTENDED RELEASE ORAL
Status: COMPLETED | OUTPATIENT
Start: 2020-12-02 | End: 2020-12-02

## 2020-12-02 RX ORDER — MUPIROCIN 20 MG/G
OINTMENT TOPICAL 2 TIMES DAILY
Status: DISCONTINUED | OUTPATIENT
Start: 2020-12-02 | End: 2020-12-02 | Stop reason: ALTCHOICE

## 2020-12-02 RX ADMIN — METRONIDAZOLE 500 MG: 500 INJECTION, SOLUTION INTRAVENOUS at 22:34

## 2020-12-02 RX ADMIN — CEFEPIME HYDROCHLORIDE 2 G: 2 INJECTION, POWDER, FOR SOLUTION INTRAVENOUS at 04:14

## 2020-12-02 RX ADMIN — CARVEDILOL 3.12 MG: 3.12 TABLET, FILM COATED ORAL at 17:46

## 2020-12-02 RX ADMIN — POTASSIUM CHLORIDE 20 MEQ: 20 TABLET, EXTENDED RELEASE ORAL at 09:51

## 2020-12-02 RX ADMIN — CLONIDINE HYDROCHLORIDE 0.2 MG: 0.1 TABLET ORAL at 17:46

## 2020-12-02 RX ADMIN — HYDRALAZINE HYDROCHLORIDE 10 MG: 20 INJECTION INTRAMUSCULAR; INTRAVENOUS at 04:12

## 2020-12-02 RX ADMIN — CEFEPIME HYDROCHLORIDE 2 G: 2 INJECTION, POWDER, FOR SOLUTION INTRAVENOUS at 17:50

## 2020-12-02 RX ADMIN — Medication 10 ML: at 22:34

## 2020-12-02 RX ADMIN — FUROSEMIDE 40 MG: 10 INJECTION, SOLUTION INTRAMUSCULAR; INTRAVENOUS at 09:51

## 2020-12-02 RX ADMIN — CLONIDINE HYDROCHLORIDE 0.2 MG: 0.1 TABLET ORAL at 09:50

## 2020-12-02 RX ADMIN — Medication 10 ML: at 13:28

## 2020-12-02 RX ADMIN — POTASSIUM CHLORIDE 40 MEQ: 20 TABLET, EXTENDED RELEASE ORAL at 13:27

## 2020-12-02 RX ADMIN — HEPARIN SODIUM 5000 UNITS: 5000 INJECTION INTRAVENOUS; SUBCUTANEOUS at 22:34

## 2020-12-02 RX ADMIN — VANCOMYCIN HYDROCHLORIDE 1000 MG: 1 INJECTION, POWDER, LYOPHILIZED, FOR SOLUTION INTRAVENOUS at 13:27

## 2020-12-02 RX ADMIN — METRONIDAZOLE 500 MG: 500 INJECTION, SOLUTION INTRAVENOUS at 09:53

## 2020-12-02 RX ADMIN — HEPARIN SODIUM 5000 UNITS: 5000 INJECTION INTRAVENOUS; SUBCUTANEOUS at 09:51

## 2020-12-02 NOTE — PROGRESS NOTES
Bilat Lower extremites discolored, dry, and scaley. Open air on pedal portion of right foot, half dollar size. Wound bed pink. Left foot foul smelling, wound to left 2nd toe.  Toe is discolored

## 2020-12-02 NOTE — INTERDISCIPLINARY ROUNDS
Interdisciplinary Rounds were completed on 12/02/20 for this patient. Rounds included nursing, clinical care leader, pharmacy, and case management. Plan of care discussed. See clinical pathway and/or care plan for interventions and desired outcomes.

## 2020-12-02 NOTE — PROGRESS NOTES
End of Shift Note    Bedside shift change report given to [id number] RN (oncoming nurse) by Baron Mayfield RN (offgoing nurse). Report included the following information SBAR, Kardex, ED Summary, Intake/Output, MAR, Recent Results and Cardiac Rhythm (NSR)    Shift worked:  7a-7p     Shift summary and any significant changes:     Uneventful. Patient rested in bed most of shift. ECHO completed. Patient down to vascular at beginning of shift. Tolerating diet well. Family called once this shift and was updated. No complaints of pain this shift. Labs collected and sent. Concerns for physician to address:  n/a     Zone phone for oncoming shift:   [phone]       Activity:  Activity Level: Up with Assistance  Number times ambulated in hallways past shift: 0  Number of times OOB to chair past shift: 0    Cardiac:   Cardiac Monitoring: Yes      Cardiac Rhythm: Normal sinus rhythm    Access:   Current line(s): PIV     Genitourinary:   Urinary status: voiding    Respiratory:      Chronic home O2 use?: N/A  Incentive spirometer at bedside: YES     GI:  Last Bowel Movement Date: 12/01/20  Current diet:  DIET REGULAR  Passing flatus: YES  Tolerating current diet: YES  % Diet Eaten: 100 %    Pain Management:   Patient states pain is manageable on current regimen: YES    Skin:  Darien Score: 16  Interventions: float heels, increase time out of bed, foam dressing and internal/external urinary devices    Patient Safety:  Fall Score:  Total Score: 4  Interventions: bed/chair alarm, assistive device (walker, cane, etc), gripper socks, pt to call before getting OOB and stay with me (per policy)  High Fall Risk: Yes    Length of Stay:  Expected LOS: 4d 2h  Actual LOS: [facility], RN

## 2020-12-02 NOTE — PROGRESS NOTES
Comprehensive Nutrition Assessment    Type and Reason for Visit: Initial(low BMI for age)    Nutrition Recommendations/Plan:   Continue Regular diet as tolerated    Nutrition Assessment:   Pt admitted with cellulitis. PMH: HTN, CKD stage 3. Chart reviewed for low BMI. Podiatry in with pt at time of attempted visit. MST negative for previous nutritional risk factors. No intakes recorded yet so unsure of her appetite. No wt hx noted. K 2.9, being repleted. Will monitor intake and need for supplements upon F/U. Malnutrition Assessment:  Malnutrition Status:     UTD, reassess upon F/U. Estimated Daily Nutrient Needs:  Energy (kcal): MSJ 1550 (992 x 1.3 +250 for wt gain); Weight Used for Energy Requirements: Current  Protein (g): 52-63g (1-1.2gPro/kg); Weight Used for Protein Requirements: Current  Fluid (ml/day): 1600mL; Method Used for Fluid Requirements: 1 ml/kcal      Nutrition Related Findings:  Meds: cefepime, lasix, flagyl, KCl, vancomycin. BM 12/1      Wounds:    Multiple(cellulitis-LE)       Current Nutrition Therapies:  DIET REGULAR    Anthropometric Measures:  · Height:  5' 4\" (162.6 cm)  · Current Body Wt:  52.2 kg (115 lb 1.3 oz)    · Ideal Body Wt:  120 lbs:  95.9 %   · BMI Category:  Underweight (BMI less than 22) age over 72       Nutrition Diagnosis:   No nutrition diagnosis at this time     Nutrition Interventions:   Food and/or Nutrient Delivery: Continue current diet  Nutrition Education and Counseling: No recommendations at this time  Coordination of Nutrition Care: Continue to monitor while inpatient    Goals:  Pt will consume >50% of meals in 3-5 days.        Nutrition Monitoring and Evaluation:   Behavioral-Environmental Outcomes: None identified  Food/Nutrient Intake Outcomes: Food and nutrient intake  Physical Signs/Symptoms Outcomes: Biochemical data, Fluid status or edema, Weight, Nutrition focused physical findings, GI status    Discharge Planning:    Continue current diet Electronically signed by Natalie La RD, [address]  on 12/2/2020 at 3:10 PM    Contact: [phone]

## 2020-12-02 NOTE — CONSULTS
Podiatry Consult    Subjective: Pt relates several weeks of wounds bilateral feet. She does not recall any injury. The wounds have been treated with antibiotics with some improvement, but then worsened when the antibiotics were discontinued. No specific wound care was performed at home. Date of Consultation: December 2, 2020     Referring Physician: NATIVIDAD Gooden MD    Patient is a 66 y.o.  female who is being seen for wounds bilateral feet. Patient Active Problem List    Diagnosis Date Noted    Cellulitis 12/01/2020     History reviewed. No pertinent past medical history. History reviewed. No pertinent surgical history.    Family History   Problem Relation Age of Onset    Diabetes Mother     Diabetes Father     Diabetes Son       Social History     Tobacco Use    Smoking status: Never Smoker   Substance Use Topics    Alcohol use: Never     Frequency: Never     Current Facility-Administered Medications   Medication Dose Route Frequency Provider Last Rate Last Dose    vancomycin (VANCOCIN) 1,000 mg in 0.9% sodium chloride 250 mL (VIAL-MATE)  1,000 mg IntraVENous ONCE Ondina Rangel MD   1,000 mg at 12/02/20 1327    [START ON 12/3/2020] vancomycin (VANCOCIN) 750 mg in 0.9% sodium chloride 250 mL (VIAL-MATE)  750 mg IntraVENous Q24H Ondina Rangel MD        carvediloL (COREG) tablet 3.125 mg  3.125 mg Oral BID WITH MEALS Kristopher Forrest MD        mupirocin (BACTROBAN) 2 % ointment   Topical BID Yesenia Aguilar DPM        acetaminophen (TYLENOL) tablet 650 mg  650 mg Oral Q6H PRN Deedee Guido MD   650 mg at 12/01/20 1745    sodium chloride (NS) flush 5-40 mL  5-40 mL IntraVENous Q8H Billy Jolly DO   10 mL at 12/02/20 1328    sodium chloride (NS) flush 5-40 mL  5-40 mL IntraVENous PRN Billy Jolly DO        polyethylene glycol (MIRALAX) packet 17 g  17 g Oral DAILY PRN Billy Jolly DO        metroNIDAZOLE (FLAGYL) IVPB premix 500 mg  500 mg IntraVENous Q12H Matt Blotter,  mL/hr at 20 0953 500 mg at 20 0953    acetaminophen (TYLENOL) tablet 650 mg  650 mg Oral Q4H PRN Matt Blotter M, DO        ondansetron [facility]) injection 4 mg  4 mg IntraVENous Q6H PRN Matt Blotter, DO        heparin (porcine) injection 5,000 Units  5,000 Units SubCUTAneous Q12H Matt Blotter, DO   5,000 Units at 20 8787    hydrALAZINE (APRESOLINE) 20 mg/mL injection 10 mg  10 mg IntraVENous Q2H PRN Matt Blotter, DO   10 mg at 20 0096    cloNIDine HCL (CATAPRES) tablet 0.2 mg  0.2 mg Oral BID Mtat Blotter M, DO   0.2 mg at 20 0950    cefepime (MAXIPIME) 2 g in 0.9% sodium chloride (MBP/ADV) 100 mL MBP  2 g IntraVENous Q12H Matt Blotter,  mL/hr at 20 0414 2 g at 20 0414    furosemide (LASIX) injection 40 mg  40 mg IntraVENous DAILY Matt Blotter, DO   40 mg at 20 7074    potassium chloride (K-DUR, KLOR-CON) SR tablet 20 mEq  20 mEq Oral DAILY Matt Blotter, DO   20 mEq at 20 0951      No Known Allergies     Review of Systems:  AO x3. NAD. Objective:     Patient Vitals for the past 8 hrs:   BP Temp Pulse Resp SpO2   20 1200   79     20 1110 (!) 154/76 98.4 °F (36.9 °C) 83 18 98 %   20 0827 (!) 198/90 97.5 °F (36.4 °C) 96 18 98 %     Temp (24hrs), Av.9 °F (36.6 °C), Min:97.5 °F (36.4 °C), Max:98.4 °F (36.9 °C)      Physical Exam:    DP and PT 2/4; CFT less than 3 seconds. Toes warm bilaterally. Pitting edema with lipodermatosclerosis bilateral legs. Edema both feet. Open lesion to dermis dorsal right mid-foot about 1 x 1.5 cm with local erythema and edema and scant pus drainage. Multiple open wounds to dermis on the left 2nd and 3rd toes, no drainage but the there is mal-odor. Severe local erythema and edema of the left 2nd toe. Sensation intact to light touch  Hammer toe deformity bilaterally. Mild TTP left 2nd toe. X-rays, 3 views left foot: Report indicates non-displaced Fx through proximal phalanx 2nd toe, but this is actually a skin fold accentuated by the edema. No gas or FB. MRI left foot: Soft tissue edema, no Fx, no FB, no gas, no fluid collections. No bone signal changes. ABIs, 0 bilaterally but TBIs are normal.    Lab Review:   Recent Results (from the past 24 hour(s))   METABOLIC PANEL, BASIC    Collection Time: 12/02/20  3:45 AM   Result Value Ref Range    Sodium 143 136 - 145 mmol/L    Potassium 2.9 (L) 3.5 - 5.1 mmol/L    Chloride 107 97 - 108 mmol/L    CO2 31 21 - 32 mmol/L    Anion gap 5 5 - 15 mmol/L    Glucose 121 (H) 65 - 100 mg/dL    BUN 25 (H) 6 - 20 MG/DL    Creatinine 1.23 (H) 0.55 - 1.02 MG/DL    BUN/Creatinine ratio 20 12 - 20      GFR est AA 51 (L) >60 ml/min/1.73m2    GFR est non-AA 42 (L) >60 ml/min/1.73m2    Calcium 8.8 8.5 - 10.1 MG/DL   CBC W/O DIFF    Collection Time: 12/02/20  3:45 AM   Result Value Ref Range    WBC 8.9 3.6 - 11.0 K/uL    RBC 3.42 (L) 3.80 - 5.20 M/uL    HGB 9.8 (L) 11.5 - 16.0 g/dL    HCT 30.9 (L) 35.0 - 47.0 %    MCV 90.4 80.0 - 99.0 FL    MCH 28.7 26.0 - 34.0 PG    MCHC 31.7 30.0 - 36.5 g/dL    RDW 14.3 11.5 - 14.5 %    PLATELET 946 (L) 684 - 400 K/uL    NRBC 0.0 0  WBC    ABSOLUTE NRBC 0.00 0.00 - 0.01 K/uL   HEMOGLOBIN A1C WITH EAG    Collection Time: 12/02/20  3:45 AM   Result Value Ref Range    Hemoglobin A1c 5.9 (H) 4.0 - 5.6 %    Est. average glucose 123 mg/dL   ANKLE BRACHIAL INDEX    Collection Time: 12/02/20  7:53 AM   Result Value Ref Range    Left arm  mmHg    Left posterior tibial 0 mmHg    Left CARISSA 0.00     Left anterior tibial 0 mmHg    Left toe pressure 209 mmHg    Left TBI 1.01     Right arm  mmHg    Right posterior tibial 0 mmHg    Right anterior tibial 0 mmHg    Right CARISSA 0.00     Right toe pressure 209 mmHg    Right TBI 1.01        Impression:   1.  Open wounds to dermis bilateral feet, etiology uncertain. 2. Cellulitis left toes and right foot  3. Normal arterial perfusion bilateral feet  4. No fracture or osteomyelitis      PLAN:    I obtained a swab culture of the wounds. I see that a C&S has already been ordered, so the nursing staff may use this swab. I cleaned and bandaged the wounds and placed wound care orders. It is unlikely she will need surgical intervention, just wound care and antibiotics.  I will F/U in 2 days./

## 2020-12-02 NOTE — PROGRESS NOTES
Pharmacy Automatic Renal Dosing Protocol - Antimicrobials    Indication for Antimicrobials: SSTI    Current Regimen of Each Antimicrobial:  20 Vancomycin 1000 mg then 750 mg Q24H (Start Date ; Day # )  Cefepime 2 grams Q12H x 7 days (Start Date , Day )    Previous Antimicrobial Therapy:    Vancomycin Goal Level: 10-15 mcg/ml      Date Dose & Interval Measured (mcg/mL) Extrapolated (mcg/mL)                       Date & time of next level:     Significant Cultures:       Radiology / Imaging results: (X-ray, CT scan or MRI):     Paralysis, amputations, malnutrition:     Labs:  Recent Labs     20  1130   CREA 1.35*   BUN 24*   WBC 9.9     Temp (24hrs), Av.5 °F (36.9 °C), Min:98.5 °F (36.9 °C), Max:98.5 °F (36.9 °C)      Is the Patient on Dialysis? No    Creatinine Clearance (mL/min):   CrCl (Ideal Body Weight): 30.1    Impression/Plan:   Antibiotics as above  Antimicrobial stop date   BMP in am     Pharmacy will follow daily and adjust medications as appropriate for renal function and/or serum levels. Thank you,  Joan Mcadams, [facility]    Recommended duration of therapy  http://[facility]/[place]/[facility]/Pharmacy/Clinical%20Companion/Duration%20of%20ABX%20therapy. docx    Renal Dosing  http://[facility]/[place]/[facility]/Pharmacy/Clinical%20Companion/Renal%[id number]. pdf

## 2020-12-02 NOTE — PROGRESS NOTES
Pt arrived from ED at change of shift 1930. B/P 152/101. Hydralazine given in ED at [address] Checklist for Stat MRI completed and faxed to radiology. Assessment as noted. Will continue to monitor b/p.

## 2020-12-02 NOTE — PROGRESS NOTES
Pharmacy Automatic Renal Dosing Protocol - Antimicrobials    Indication for Antimicrobials: SSTI    Current Regimen of Each Antimicrobial:  Vancomycin 1000 mg then 750 mg Q24H (Start Date ; Day # 27)  Cefepime 2 grams Q12H x 7 days (Start Date , Day 127)  Metronidazole 500mg iv q12h x 7days (SD 12, Day # 2/7)  Previous Antimicrobial Therapy:    Vancomycin Goal Level: 10-15 mcg/ml      Date Dose & Interval Measured (mcg/mL) Extrapolated (mcg/mL)                       Date & time of next level:     Significant Cultures:       Radiology / Imaging results: (X-ray, CT scan or MRI):     Paralysis, amputations, malnutrition:     Labs:  Recent Labs     20  0345 20  1130   CREA 1.23* 1.35*   BUN 25* 24*   WBC 8.9 9.9     Temp (24hrs), Av °F (36.7 °C), Min:97.5 °F (36.4 °C), Max:98.5 °F (36.9 °C)      Is the Patient on Dialysis? No    Creatinine Clearance (mL/min):   CrCl (Ideal Body Weight): 33.1    Impression/Plan:   Vancomycin loading dose order  before being given  Vanco re-entered to start today 1000mg then 750mg iv q24h  Antimicrobial stop date   BMP in am     Pharmacy will follow daily and adjust medications as appropriate for renal function and/or serum levels. Thank you,  Maya Garcia, [facility]    Recommended duration of therapy  http://[facility]/[place]/[facility]/Pharmacy/Clinical%20Companion/Duration%20of%20ABX%20therapy. docx    Renal Dosing  http://[facility]/[place]/[facility]/Pharmacy/Clinical%20Companion/Renal%[id number]. pdf

## 2020-12-02 NOTE — PROGRESS NOTES
Hospitalist Progress Note    NAME: [facility]   :  1945   MRN:  [id number]       Assessment / Plan:  LE cellulitis  MRI L foot neg for any fracture. No OM. Nonspecific diffuse subcutaneous edema. No evidence of a focal drainable fluid collection  Check procalcitonin, obtain wound cx  Will try diuresing with IV lasix  Empiric abx for now  A1C 5.9  F/u with CARISSA  Podiatry consulted    HTN  Not on any meds at home  Add coreg. Cont' clonidine     Hypokalemia  K 2.9, will replete with KCl  Monitor labs daily. Check mag    PNA on CXR  Pleural effusion/volume overload, ? CHF  CXR showed R pleural effusion with basilar air space disease  probnp elevated  Pt already on IV abx as above  Cont' IV lasix  Obtain Echo   Monitor daily lytes, strict I&O    TONJA vs CKD3  Cr trending down, monitor      Code status: Full  Prophylaxis: Hep SQ  Recommended Disposition: TBD     Subjective:     Chief Complaint / Reason for Physician Visit  Pt seen, NAD. Discussed with RN events overnight. Review of Systems:  Symptom Y/N Comments  Symptom Y/N Comments   Fever/Chills    Chest Pain     Poor Appetite    Edema     Cough    Abdominal Pain     Sputum    Joint Pain     SOB/LACY    Pruritis/Rash     Nausea/vomit    Tolerating PT/OT     Diarrhea    Tolerating Diet     Constipation    Other       Could NOT obtain due to:      Objective:     VITALS:   Last 24hrs VS reviewed since prior progress note.  Most recent are:  Patient Vitals for the past 24 hrs:   Temp Pulse Resp BP SpO2   20 1200  79      20 1110 98.4 °F (36.9 °C) 83 18 (!) 154/76 98 %   20 0827 97.5 °F (36.4 °C) 96 18 (!) 198/90 98 %   20 0347 97.7 °F (36.5 °C) 77 18 (!) 188/88 100 %   20 0019 97.8 °F (36.6 °C) 80 19 (!) 177/75 95 %   20 1918 98.3 °F (36.8 °C) 90 24 (!) 152/101 99 %   20 1850    (!) 171/105    20 1837  82 30 (!) 199/107 93 %   20 1545  79 27 (!) 187/93 94 %   20 1430  82 19 (!) 184/106 93 %   12/01/20 1330  76 26 (!) 189/99 92 %   12/01/20 1316  74 22 (!) 173/94 93 %   12/01/20 1300  79 28 (!) 191/109 93 %       Intake/Output Summary (Last 24 hours) at 12/2/2020 1230  Last data filed at 12/2/2020 0401  Gross per 24 hour   Intake 240 ml   Output 300 ml   Net -60 ml        I had a face to face encounter and independently examined this patient on 12/2/2020, as outlined below:  PHYSICAL EXAM:  General: WD, WN. Alert, cooperative, no acute distress    EENT:  EOMI. Anicteric sclerae. MMM  Resp:  CTA bilaterally, no wheezing or rales. No accessory muscle use  CV:  Regular  rhythm,  No edema  GI:  Soft, Non distended, Non tender. +BS  Neurologic:  Alert and oriented X 3, normal speech  Psych:   Not anxious nor agitated  Skin:  Dry scabs on b/l LE. Black discoloration of L 2nd toe. Ulceration on R dorsal forefoot. Reviewed most current lab test results and cultures  YES  Reviewed most current radiology test results   YES  Review and summation of old records today    NO  Reviewed patient's current orders and MAR    YES  PMH/SH reviewed - no change compared to H&P  ________________________________________________________________________  Care Plan discussed with:    Comments   Patient x    Family      RN x    Care Manager     Consultant                        Multidiciplinary team rounds were held today with , nursing, pharmacist and clinical coordinator. Patient's plan of care was discussed; medications were reviewed and discharge planning was addressed.      ________________________________________________________________________  Total NON critical care TIME: 35 Minutes    Total CRITICAL CARE TIME Spent:   Minutes non procedure based      Comments   >50% of visit spent in counseling and coordination of care     ________________________________________________________________________  Cheri Angle, MD     Procedures: see electronic medical records for all procedures/Xrays and details which were not copied into this note but were reviewed prior to creation of Plan. LABS:  I reviewed today's most current labs and imaging studies.   Pertinent labs include:  Recent Labs     12/02/20  0345 12/01/20  1130   WBC 8.9 9.9   HGB 9.8* 10.5*   HCT 30.9* 33.2*   * 156     Recent Labs     12/02/20  0345 12/01/20  1130    140   K 2.9* 2.8*    103   CO2 31 30   * 135*   BUN 25* 24*   CREA 1.23* 1.35*   CA 8.8 8.6   ALB  --  3.2*   TBILI  --  0.8   ALT  --  19       Signed: Tereza Gray MD

## 2020-12-02 NOTE — PROGRESS NOTES
GROVER attempted to conduct initial assessment. Patient isn't AOx4 has some confusion. GROVER phoned listed emergency contact Nidia Marilyn @ [phone] and no answer. [facility]OVER will attempt to reach again.           STEPHEN Palomino

## 2020-12-02 NOTE — PROGRESS NOTES
TRANSFER - IN REPORT:    Ver[facility] for routine progression of care      Report consisted of patients Situation, Background, Assessment and   Recommendations(SBAR). Information from the following report(s) SBAR, Kardex, Intake/Output, MAR and Recent Results was reviewed with the receiving nurse. Opportunity for questions and clarification was provided. Pt arrived at 1910, this nurse assisted pt to room and obtained set of VS. Report given to night shift nurse who will assume care upon arrival to unit.

## 2020-12-02 NOTE — WOUND CARE
Wound Care consult: Chart reviewed and patient assessed for her bilateral lower leg skin condition / wounds that were present on admission. Pt. Is 66years old and has chronically bad hygiene with regard to the toe nails and the skin on the legs. Per the Hospitalist note / plan:   \"MRI L foot neg for any fracture. No OM. Nonspecific diffuse subcutaneous edema. No evidence of a focal drainable fluid collection  Check procalcitonin, obtain wound cx  Will try diuresing with IV lasix  Empiric abx for now. A1C 5.9. F/u with CARISSA. Podiatry consulted  See photos below, taken in the ED yesterday:     12-1-2020: both legs:      Right leg after debridement:        Right foot / leg:     Right foot after debridement:      Left foot / 2-3 toes:      Left toes: Not inflamed,  No drainage, No pain. Partial   Thickness wound on 2nd toe. Treatment today: all wounds cleansed with 1/2 strength peroxide and NS and wiped with gauze. Also wiped with a warm CHG wipe. The culture of the left toe was done by Dr. Aga Matamoros and the culture of the open wound on the right lower leg was done by wound care nurse. Both cultures to be sent by nursing. The wounds and surrounding skin were cleansed with then debrided without any pain despite significant scraping of the skin. There were 3 partial thickness leg wounds that were revealed when the dark epithelial tissue. All wounds were dressed with Xeroform gauze and covered with foam dressings. Wound care orders written for the same.    Reyna Almanzar RN, BSN, [employer]

## 2020-12-03 PROBLEM — I10 HYPERTENSION, UNCONTROLLED: Status: ACTIVE | Noted: 2020-12-03

## 2020-12-03 PROBLEM — I50.20 SYSTOLIC HEART FAILURE (HCC): Status: ACTIVE | Noted: 2020-12-03

## 2020-12-03 PROBLEM — I42.9 CARDIOMYOPATHY (HCC): Status: ACTIVE | Noted: 2020-12-03

## 2020-12-03 PROBLEM — I44.1 MOBITZ TYPE 2 SECOND DEGREE ATRIOVENTRICULAR BLOCK: Status: ACTIVE | Noted: 2020-12-03

## 2020-12-03 PROBLEM — I50.20 HFREF (HEART FAILURE WITH REDUCED EJECTION FRACTION) (HCC): Status: ACTIVE | Noted: 2020-12-03

## 2020-12-03 LAB
ANION GAP SERPL CALC-SCNC: 5 MMOL/L (ref 5–15)
BASOPHILS # BLD: 0.1 K/UL (ref 0–0.1)
BASOPHILS NFR BLD: 1 % (ref 0–1)
BUN SERPL-MCNC: 27 MG/DL (ref 6–20)
BUN/CREAT SERPL: 14 (ref 12–20)
CALCIUM SERPL-MCNC: 8.5 MG/DL (ref 8.5–10.1)
CHLORIDE SERPL-SCNC: 108 MMOL/L (ref 97–108)
CO2 SERPL-SCNC: 27 MMOL/L (ref 21–32)
CREAT SERPL-MCNC: 1.9 MG/DL (ref 0.55–1.02)
DIFFERENTIAL METHOD BLD: ABNORMAL
EOSINOPHIL # BLD: 0.3 K/UL (ref 0–0.4)
EOSINOPHIL NFR BLD: 3 % (ref 0–7)
ERYTHROCYTE [DISTWIDTH] IN BLOOD BY AUTOMATED COUNT: 14.6 % (ref 11.5–14.5)
GLUCOSE SERPL-MCNC: 143 MG/DL (ref 65–100)
HCT VFR BLD AUTO: 29.9 % (ref 35–47)
HGB BLD-MCNC: 9.5 G/DL (ref 11.5–16)
IMM GRANULOCYTES # BLD AUTO: 0 K/UL (ref 0–0.04)
IMM GRANULOCYTES NFR BLD AUTO: 0 % (ref 0–0.5)
LEFT ABI: 0
LEFT ANTERIOR TIBIAL: 0 MMHG
LEFT ARM BP: 200 MMHG
LEFT POSTERIOR TIBIAL: 0 MMHG
LEFT TBI: 1.01
LEFT TOE PRESSURE: 209 MMHG
LYMPHOCYTES # BLD: 1.5 K/UL (ref 0.8–3.5)
LYMPHOCYTES NFR BLD: 16 % (ref 12–49)
MAGNESIUM SERPL-MCNC: 2.2 MG/DL (ref 1.6–2.4)
MCH RBC QN AUTO: 28.7 PG (ref 26–34)
MCHC RBC AUTO-ENTMCNC: 31.8 G/DL (ref 30–36.5)
MCV RBC AUTO: 90.3 FL (ref 80–99)
MONOCYTES # BLD: 0.8 K/UL (ref 0–1)
MONOCYTES NFR BLD: 8 % (ref 5–13)
NEUTS SEG # BLD: 6.8 K/UL (ref 1.8–8)
NEUTS SEG NFR BLD: 72 % (ref 32–75)
NRBC # BLD: 0 K/UL (ref 0–0.01)
NRBC BLD-RTO: 0 PER 100 WBC
PLATELET # BLD AUTO: 140 K/UL (ref 150–400)
POTASSIUM SERPL-SCNC: 3.6 MMOL/L (ref 3.5–5.1)
RBC # BLD AUTO: 3.31 M/UL (ref 3.8–5.2)
RIGHT ABI: 0
RIGHT ANTERIOR TIBIAL: 0 MMHG
RIGHT ARM BP: 207 MMHG
RIGHT POSTERIOR TIBIAL: 0 MMHG
RIGHT TBI: 1.01
RIGHT TOE PRESSURE: 209 MMHG
SODIUM SERPL-SCNC: 140 MMOL/L (ref 136–145)
WBC # BLD AUTO: 9.3 K/UL (ref 3.6–11)

## 2020-12-03 PROCEDURE — 36415 COLL VENOUS BLD VENIPUNCTURE: CPT

## 2020-12-03 PROCEDURE — 74011250637 HC RX REV CODE- 250/637: Performed by: INTERNAL MEDICINE

## 2020-12-03 PROCEDURE — 65660000000 HC RM CCU STEPDOWN

## 2020-12-03 PROCEDURE — 74011250636 HC RX REV CODE- 250/636: Performed by: INTERNAL MEDICINE

## 2020-12-03 PROCEDURE — 94760 N-INVAS EAR/PLS OXIMETRY 1: CPT

## 2020-12-03 PROCEDURE — 74011000258 HC RX REV CODE- 258: Performed by: INTERNAL MEDICINE

## 2020-12-03 PROCEDURE — 83735 ASSAY OF MAGNESIUM: CPT

## 2020-12-03 PROCEDURE — 74011250637 HC RX REV CODE- 250/637: Performed by: EMERGENCY MEDICINE

## 2020-12-03 PROCEDURE — 74011250637 HC RX REV CODE- 250/637: Performed by: PODIATRIST

## 2020-12-03 PROCEDURE — 99223 1ST HOSP IP/OBS HIGH 75: CPT | Performed by: INTERNAL MEDICINE

## 2020-12-03 PROCEDURE — 85025 COMPLETE CBC W/AUTO DIFF WBC: CPT

## 2020-12-03 PROCEDURE — 80048 BASIC METABOLIC PNL TOTAL CA: CPT

## 2020-12-03 RX ORDER — ALPRAZOLAM 0.25 MG/1
0.25 TABLET ORAL
Status: DISCONTINUED | OUTPATIENT
Start: 2020-12-03 | End: 2020-12-10 | Stop reason: HOSPADM

## 2020-12-03 RX ORDER — ASPIRIN 81 MG/1
81 TABLET ORAL DAILY
Status: DISCONTINUED | OUTPATIENT
Start: 2020-12-03 | End: 2020-12-10 | Stop reason: HOSPADM

## 2020-12-03 RX ORDER — CARVEDILOL 6.25 MG/1
6.25 TABLET ORAL 2 TIMES DAILY WITH MEALS
Status: DISCONTINUED | OUTPATIENT
Start: 2020-12-03 | End: 2020-12-04

## 2020-12-03 RX ORDER — AMLODIPINE BESYLATE 5 MG/1
5 TABLET ORAL DAILY
Status: DISCONTINUED | OUTPATIENT
Start: 2020-12-03 | End: 2020-12-03

## 2020-12-03 RX ORDER — HYDRALAZINE HYDROCHLORIDE 20 MG/ML
10 INJECTION INTRAMUSCULAR; INTRAVENOUS
Status: DISCONTINUED | OUTPATIENT
Start: 2020-12-03 | End: 2020-12-10 | Stop reason: HOSPADM

## 2020-12-03 RX ORDER — CLONIDINE HYDROCHLORIDE 0.1 MG/1
0.2 TABLET ORAL 3 TIMES DAILY
Status: DISCONTINUED | OUTPATIENT
Start: 2020-12-03 | End: 2020-12-10 | Stop reason: HOSPADM

## 2020-12-03 RX ORDER — CARVEDILOL 6.25 MG/1
6.25 TABLET ORAL 2 TIMES DAILY WITH MEALS
Status: CANCELLED | OUTPATIENT
Start: 2020-12-03

## 2020-12-03 RX ADMIN — METRONIDAZOLE 500 MG: 500 INJECTION, SOLUTION INTRAVENOUS at 20:49

## 2020-12-03 RX ADMIN — Medication 10 ML: at 21:10

## 2020-12-03 RX ADMIN — ACETAMINOPHEN 650 MG: 325 TABLET ORAL at 20:43

## 2020-12-03 RX ADMIN — ONDANSETRON 4 MG: 2 INJECTION INTRAMUSCULAR; INTRAVENOUS at 06:54

## 2020-12-03 RX ADMIN — HYDRALAZINE HYDROCHLORIDE 10 MG: 20 INJECTION INTRAMUSCULAR; INTRAVENOUS at 20:44

## 2020-12-03 RX ADMIN — POLYETHYLENE GLYCOL 3350 17 G: 17 POWDER, FOR SOLUTION ORAL at 01:55

## 2020-12-03 RX ADMIN — HYDRALAZINE HYDROCHLORIDE 10 MG: 20 INJECTION INTRAMUSCULAR; INTRAVENOUS at 08:24

## 2020-12-03 RX ADMIN — Medication 10 ML: at 13:58

## 2020-12-03 RX ADMIN — ONDANSETRON 4 MG: 2 INJECTION INTRAMUSCULAR; INTRAVENOUS at 13:58

## 2020-12-03 RX ADMIN — CEFEPIME HYDROCHLORIDE 2 G: 2 INJECTION, POWDER, FOR SOLUTION INTRAVENOUS at 17:26

## 2020-12-03 RX ADMIN — POTASSIUM CHLORIDE 20 MEQ: 20 TABLET, EXTENDED RELEASE ORAL at 08:25

## 2020-12-03 RX ADMIN — CLONIDINE HYDROCHLORIDE 0.2 MG: 0.1 TABLET ORAL at 21:10

## 2020-12-03 RX ADMIN — METRONIDAZOLE 500 MG: 500 INJECTION, SOLUTION INTRAVENOUS at 08:26

## 2020-12-03 RX ADMIN — CEFEPIME HYDROCHLORIDE 2 G: 2 INJECTION, POWDER, FOR SOLUTION INTRAVENOUS at 05:04

## 2020-12-03 RX ADMIN — Medication 10 ML: at 05:02

## 2020-12-03 RX ADMIN — HEPARIN SODIUM 5000 UNITS: 5000 INJECTION INTRAVENOUS; SUBCUTANEOUS at 20:43

## 2020-12-03 RX ADMIN — ASPIRIN 81 MG: 81 TABLET, COATED ORAL at 11:07

## 2020-12-03 RX ADMIN — HEPARIN SODIUM 5000 UNITS: 5000 INJECTION INTRAVENOUS; SUBCUTANEOUS at 08:25

## 2020-12-03 RX ADMIN — CLONIDINE HYDROCHLORIDE 0.2 MG: 0.1 TABLET ORAL at 08:25

## 2020-12-03 RX ADMIN — MUPIROCIN: 20 OINTMENT TOPICAL at 11:08

## 2020-12-03 RX ADMIN — CARVEDILOL 3.12 MG: 3.12 TABLET, FILM COATED ORAL at 08:25

## 2020-12-03 RX ADMIN — CLONIDINE HYDROCHLORIDE 0.2 MG: 0.1 TABLET ORAL at 17:26

## 2020-12-03 NOTE — PROGRESS NOTES
End of Shift Note    Bedside shift change report given to Mirza Block (oncoming nurse) by Kathleen (offgoing nurse). Report included the following information SBAR, Kardex, Intake/Output, MAR and Recent Results    Shift worked:  7p-7a     Shift summary and any significant changes:     Pt uneventful. Pt had c/o abd pain on L side and stated that it felt like she had to have a BM, this nurse offered pt some tylenol but asked to have miralax instead. Drank fluids overnight. No BM or nausea overnight. 0654-Pt rang call bell and stated that she couldn't burp and felt like she had to throw up. This nurse helped pt up to side of bed and pt had 100cc emesis output. zofran was given. Pt stated that miralax helped with earlier pain. Concerns for physician to address:  none     Zone phone for oncoming shift:   [phone]       Activity:  Activity Level: Up with Assistance  Number times ambulated in hallways past shift: 0  Number of times OOB to chair past shift: 0    Cardiac:   Cardiac Monitoring: Yes      Cardiac Rhythm: Normal sinus rhythm    Access:   Current line(s): PIV     Genitourinary:   Urinary status: incontinent    Respiratory:      Chronic home O2 use?: NO  Incentive spirometer at bedside: YES     GI:  Last Bowel Movement Date: 12/01/20  Current diet:  DIET REGULAR  Passing flatus: YES  Tolerating current diet: YES  % Diet Eaten: 100 %    Pain Management:   Patient states pain is manageable on current regimen: YES    Skin:  Darien Score: 16  Interventions: increase time out of bed, foam dressing and internal/external urinary devices    Patient Safety:  Fall Score:  Total Score: 4  Interventions: bed/chair alarm and pt to call before getting OOB  High Fall Risk: Yes    Length of Stay:  Expected LOS: 4d 2h  Actual LOS: 9[facility]

## 2020-12-03 NOTE — CONSULTS
[address] [facility]     Date of  Admission: 12/1/2020 11:33 AM     Admission type:Emergency    Consult for: new onset cardiomyopathy with systolic heart failure  Consult by: hospitalist     Subjective:     Gelacio Vidal is a 66 y.o. female admitted for Cellulitis [L03.90]. Admittted with c/o LE edema and cellulitis. No previous cardiac hx. Had been on antihtn medications in 2013, but stopped after she retired. Her left foot injury occurred around 2013 with no medical attention to foot. Denies CP, SOB, dizziness/lightheadedness, palpitations.      ECG SR with no acute changes troponin neg, NTproBNP 6157  Echo EF 35-40%  Telemetry shows SR, but multiple episodes of 2nd deg AVB type 2    Cardiac risk factors: sedentary life style, hypertension, post-menopausal.      Patient Active Problem List    Diagnosis Date Noted    Cardiomyopathy ([address] 06/87/5500    Systolic heart failure ([address] 12/03/2020    HFrEF (heart failure with reduced ejection fraction) ([address] 12/03/2020    Hypertension, uncontrolled 12/03/2020    Mobitz type 2 second degree atrioventricular block 12/03/2020    Cellulitis 12/01/2020      None  Past Medical History:   Diagnosis Date    Cardiomyopathy ([address] 12/3/2020    HFrEF (heart failure with reduced ejection fraction) ([address] 12/3/2020    Hypertension, uncontrolled 12/3/2020    Mobitz type 2 second degree atrioventricular block 40/3/5522    Systolic heart failure ([address] 12/3/2020      Social History     Socioeconomic History    Marital status: SINGLE     Spouse name: Not on file    Number of children: 2    Years of education: Not on file    Highest education level: Not on file   Tobacco Use    Smoking status: Never Smoker   Substance and Sexual Activity    Alcohol use: Never     Frequency: Never     No Known Allergies   Family History   Problem Relation Age of Onset    Diabetes Mother     Diabetes Father     Diabetes Son       Current Facility-Administered Medications   Medication Dose Route Frequency    carvediloL (COREG) tablet 6.25 mg  6.25 mg Oral BID WITH MEALS    cloNIDine HCL (CATAPRES) tablet 0.2 mg  0.2 mg Oral TID    hydrALAZINE (APRESOLINE) 20 mg/mL injection 10 mg  10 mg IntraVENous Q6H PRN    aspirin delayed-release tablet 81 mg  81 mg Oral DAILY    [START ON 12/4/2020] vancomycin (VANCOCIN) 750 mg in 0.9% sodium chloride 250 mL (VIAL-MATE)  750 mg IntraVENous Q36H    mupirocin (BACTROBAN) 2 % ointment   Topical DAILY    acetaminophen (TYLENOL) tablet 650 mg  650 mg Oral Q6H PRN    sodium chloride (NS) flush 5-40 mL  5-40 mL IntraVENous Q8H    sodium chloride (NS) flush 5-40 mL  5-40 mL IntraVENous PRN    polyethylene glycol (MIRALAX) packet 17 g  17 g Oral DAILY PRN    metroNIDAZOLE (FLAGYL) IVPB premix 500 mg  500 mg IntraVENous Q12H    acetaminophen (TYLENOL) tablet 650 mg  650 mg Oral Q4H PRN    ondansetron (ZOFRAN) injection 4 mg  4 mg IntraVENous Q6H PRN    heparin (porcine) injection 5,000 Units  5,000 Units SubCUTAneous Q12H    cefepime (MAXIPIME) 2 g in 0.9% sodium chloride (MBP/ADV) 100 mL MBP  2 g IntraVENous Q12H    potassium chloride (K-DUR, KLOR-CON) SR tablet 20 mEq  20 mEq Oral DAILY        Review of Symptoms:   11 systems reviewed, negative other than as stated in the HPI        Objective:      Visit Vitals  BP (!) 165/77   Pulse 66   Temp 98 °F (36.7 °C)   Resp 17   Ht 5' 4\" (1.626 m)   Wt 115 lb (52.2 kg)   SpO2 100%   Breastfeeding No   BMI 19.74 kg/m²       Physical:   General: older AAF in no acute distress  Heart: RRR, no m/S3/JVD, no carotid bruits   Lungs: clear   Abdomen: Soft, +BS, NTND   Extremities:  Left foot bandaged, no peripheral edema  Neurologic: Grossly normal    Data Review:   Recent Labs     12/03/20  0458 12/02/20  0345 12/01/20  1130   WBC 9.3 8.9 9.9   HGB 9.5* 9.8* 10.5*   HCT 29.9* 30.9* 33.2*   * 136* 156     Recent Labs     12/03/20  0458 12/02/20  0345 12/01/20  1130    143 140   K 3.6 2.9* 2.8*    107 103   CO2 27 31 30   * 121* 135*   BUN 27* 25* 24*   CREA 1.90* 1.23* 1.35*   CA 8.5 8.8 8.6   MG 2.2  --   --    ALB  --   --  3.2*   TBILI  --   --  0.8   ALT  --   --  19       Recent Labs     12/01/20  1130   TROIQ <0.05         Intake/Output Summary (Last 24 hours) at 12/3/2020 1326  Last data filed at 12/3/2020 0802  Gross per 24 hour   Intake 1130 ml   Output 300 ml   Net 830 ml        Cardiographics    Telemetry: SR, 2nd   ECG: SR    Echocardiogram: 12/1/2020  · LV: Estimated LVEF is 35 - 40%. Normal cavity size and wall thickness. Age-appropriate left ventricular diastolic function. · TV: Mild tricuspid valve regurgitation is present. · MV: Mild mitral valve regurgitation is present. CXRAY:Right pleural effusion with basilar air space disease     Assessment:       Principal Problem:    Cellulitis (12/1/2020)    Active Problems:    Cardiomyopathy ([facility] [address] (93/8/8556)      Systolic heart failure ([address] (12/3/2020)      HFrEF (heart failure with reduced ejection fraction) ([facility] [address] (12/3/2020)      Hypertension, uncontrolled (12/3/2020)      Mobitz type 2 second degree atrioventricular block (12/3/2020)         Plan:     Newly dx cardiomyopathy with systolic heart failure (EF 35-40%)  Likely r/t long term uncontrolled HTN  Had been receiving lasix until this AM, on hold for worsening renal function  Coreg now on hold with 2nd deg HB. Patient denies any previous hx and any symptoms  No ACEI due to renal dysfunction  Monitor I/O, daily weights, labs. Provide HF education  Further cardiac evaluation to be determined once she recovers from cellulitis    HTN:  Uncontrolled on admission, received IV hydralazine, labetelol  Continues on Clonidine . 2mg TID   Holding Coreg and other AV blocking medications with 2nd deg HB  Start Norvasc for BP control    2nd degree HB:  BB on hold, continue to monitor to see if arrhythmia resolves    Thank you for consulting [facility]        Rudy Norman NP

## 2020-12-03 NOTE — PROGRESS NOTES
CM left VM with pt's son, Marnie Hu, requested return call to complete assessment.       Souleymane Fernández thiago [facility] [address]

## 2020-12-03 NOTE — PROGRESS NOTES
Hospitalist Progress Note    NAME: Tony Domingo   :  1945   MRN:  [id number]       Assessment / Plan:  LE cellulitis  MRI L foot neg for any fracture. No OM. Nonspecific diffuse subcutaneous edema. 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 R resting CARISSA is nl. R TBI 1.01.  L resting CARISSA is normal, L TIB 1.01  A1C 5.9  Procalcitonin <0.05, wound cx pending  Hold lasix due to elevated cr  Empiric IV abx for now  Podiatry and wound care following, appreciate recs    HTN  Not on any meds at home  Adjusting coreg and clonidine. Prn hydralazine    TONJA, likely from diuretics and recent contrast use. Will hold diuretic. Avoid nephrotoxic agents. Repeat bmp in the AM    Hypokalemia, repleted    PNA on CXR  Pleural effusion/volume overload, ? CHF  CXR showed R pleural effusion with basilar air space disease  probnp elevated  Pt already on IV abx as above  Cont' IV lasix  Obtain Echo   Monitor daily lytes, strict I&O    TONJA vs CKD3  Cr trending down, monitor      Code status: Full  Prophylaxis: Hep SQ  Recommended Disposition: TBD     Subjective:     Chief Complaint / Reason for Physician Visit  Pt seen, NAD. Discussed with RN events overnight. Review of Systems:  Symptom Y/N Comments  Symptom Y/N Comments   Fever/Chills    Chest Pain     Poor Appetite    Edema     Cough    Abdominal Pain     Sputum    Joint Pain     SOB/LACY    Pruritis/Rash     Nausea/vomit    Tolerating PT/OT     Diarrhea    Tolerating Diet     Constipation    Other       Could NOT obtain due to:      Objective:     VITALS:   Last 24hrs VS reviewed since prior progress note.  Most recent are:  Patient Vitals for the past 24 hrs:   Temp Pulse Resp BP SpO2   20 0802 97.4 °F (36.3 °C) 78 17 (!) 185/100 98 %   20 0405 98.3 °F (36.8 °C) 64 18 132/69 100 %   20 2357 98.2 °F (36.8 °C) 65 18 130/68 98 %   20 97.5 °F (36.4 °C) 67 18 129/77 100 % 12/02/20 1600  75      12/02/20 1510    (!) 150/80    12/02/20 1455 98.3 °F (36.8 °C) 71 18 (!) 150/80 97 %   12/02/20 1200  79      12/02/20 1110 98.4 °F (36.9 °C) 83 18 (!) 154/76 98 %       Intake/Output Summary (Last 24 hours) at 12/3/2020 1033  Last data filed at 12/3/2020 0654  Gross per 24 hour   Intake 1130 ml   Output 300 ml   Net 830 ml        I had a face to face encounter and independently examined this patient on 12/3/2020, as outlined below:  PHYSICAL EXAM:  General: WD, WN. Alert, cooperative, no acute distress    EENT:  EOMI. Anicteric sclerae. MMM  Resp:  CTA bilaterally, no wheezing or rales. No accessory muscle use  CV:  Regular  rhythm,  No edema  GI:  Soft, Non distended, Non tender. +BS  Neurologic:  Alert and oriented X 3, normal speech  Psych:   Not anxious nor agitated  Skin:  Dry scabs on b/l LE. Black discoloration of L 2nd toe. Ulceration on R dorsal forefoot. Reviewed most current lab test results and cultures  YES  Reviewed most current radiology test results   YES  Review and summation of old records today    NO  Reviewed patient's current orders and MAR    YES  PMH/ reviewed - no change compared to H&P  ________________________________________________________________________  Care Plan discussed with:    Comments   Patient x    Family      RN x    Care Manager     Consultant                        Multidiciplinary team rounds were held today with , nursing, pharmacist and clinical coordinator. Patient's plan of care was discussed; medications were reviewed and discharge planning was addressed.      ________________________________________________________________________  Total NON critical care TIME: 35 Minutes    Total CRITICAL CARE TIME Spent:   Minutes non procedure based      Comments   >50% of visit spent in counseling and coordination of care     ________________________________________________________________________  Amanda Wei MD Procedures: see electronic medical records for all procedures/Xrays and details which were not copied into this note but were reviewed prior to creation of Plan. LABS:  I reviewed today's most current labs and imaging studies.   Pertinent labs include:  Recent Labs     12/03/20 0458 12/02/20 0345 12/01/20  1130   WBC 9.3 8.9 9.9   HGB 9.5* 9.8* 10.5*   HCT 29.9* 30.9* 33.2*   * 136* 156     Recent Labs     12/03/20 0458 12/02/20 0345 12/01/20  1130    143 140   K 3.6 2.9* 2.8*    107 103   CO2 27 31 30   * 121* 135*   BUN 27* 25* 24*   CREA 1.90* 1.23* 1.35*   CA 8.5 8.8 8.6   MG 2.2  --   --    ALB  --   --  3.2*   TBILI  --   --  0.8   ALT  --   --  19       Signed: Chaz Adams MD

## 2020-12-03 NOTE — PROGRESS NOTES
Spiritual Care Assessment/Progress Note  [facility]      NAME: Adrianna Rodriguez      MRN: [id number]  AGE: 66 y.o. SEX: female  Denominational Affiliation: Gnosticism   Language: English     12/3/2020     Total Time (in minutes): 7     Spiritual Assessment begun in [facility] through conversation with:         [x]Patient        [] Family    [] Friend(s)        Reason for Consult:  Other (comment)(No Pass Zone)     Spiritual beliefs: (Please include comment if needed)     [] Identifies with a lori tradition:         [] Supported by a lori community:            [] Claims no spiritual orientation:           [] Seeking spiritual identity:                [] Adheres to an individual form of spirituality:           [x] Not able to assess:   Did not indicate                        Identified resources for coping:      [x] Prayer                               [] Music                  [] Guided Imagery     [] Family/friends                 [] Pet visits     [] Devotional reading                         [] Unknown     [] Other:                                             Interventions offered during this visit: (See comments for more details)    Patient Interventions: Affirmation of emotions/emotional suffering, Catharsis/review of pertinent events in supportive environment, Iconic (affirming the presence of God/Higher Power), Initial/Spiritual assessment, patient floor, Prayer (assurance of)           Plan of Care:     [] Support spiritual and/or cultural needs    [] Support AMD and/or advance care planning process      [] Support grieving process   [] Coordinate Rites and/or Rituals    [] Coordination with community clergy   [x] No spiritual needs identified at this time   [] Detailed Plan of Care below (See Comments)  [] Make referral to Music Therapy  [] Make referral to Pet Therapy     [] Make referral to Addiction services  [] Make referral to [facility]  [] Make referral to Spiritual Care Partner  [] No future visits requested        [] Follow up upon further referrals     Comments: While rounding on Gen Surg, noticed that patient's call light was on in mora and her bell was ringing. Knocked on door, introduced self and offered assistance. Patient could not locate her phone. It had fallen off her bed. Returned phone to her   Visited briefly with patient. She expressed no spiritual needs or concerns, but smiled brightly when  offered assurance of prayer.        SOL Swann, [facility], Staff [address]    [address] Paging Service  [phone]

## 2020-12-04 LAB
ANION GAP SERPL CALC-SCNC: 8 MMOL/L (ref 5–15)
ATRIAL RATE: 72 BPM
BASOPHILS # BLD: 0 K/UL (ref 0–0.1)
BASOPHILS NFR BLD: 1 % (ref 0–1)
BUN SERPL-MCNC: 34 MG/DL (ref 6–20)
BUN/CREAT SERPL: 17 (ref 12–20)
CALCIUM SERPL-MCNC: 8.5 MG/DL (ref 8.5–10.1)
CALCULATED P AXIS, ECG09: 33 DEGREES
CALCULATED R AXIS, ECG10: 0 DEGREES
CALCULATED T AXIS, ECG11: -123 DEGREES
CHLORIDE SERPL-SCNC: 108 MMOL/L (ref 97–108)
CO2 SERPL-SCNC: 24 MMOL/L (ref 21–32)
CREAT SERPL-MCNC: 2.02 MG/DL (ref 0.55–1.02)
DIAGNOSIS, 93000: NORMAL
DIFFERENTIAL METHOD BLD: ABNORMAL
EOSINOPHIL # BLD: 0.1 K/UL (ref 0–0.4)
EOSINOPHIL NFR BLD: 1 % (ref 0–7)
ERYTHROCYTE [DISTWIDTH] IN BLOOD BY AUTOMATED COUNT: 14.6 % (ref 11.5–14.5)
GLUCOSE SERPL-MCNC: 131 MG/DL (ref 65–100)
HCT VFR BLD AUTO: 31.9 % (ref 35–47)
HGB BLD-MCNC: 10.1 G/DL (ref 11.5–16)
IMM GRANULOCYTES # BLD AUTO: 0 K/UL (ref 0–0.04)
IMM GRANULOCYTES NFR BLD AUTO: 1 % (ref 0–0.5)
LYMPHOCYTES # BLD: 1.9 K/UL (ref 0.8–3.5)
LYMPHOCYTES NFR BLD: 23 % (ref 12–49)
MCH RBC QN AUTO: 28.5 PG (ref 26–34)
MCHC RBC AUTO-ENTMCNC: 31.7 G/DL (ref 30–36.5)
MCV RBC AUTO: 90.1 FL (ref 80–99)
MONOCYTES # BLD: 0.8 K/UL (ref 0–1)
MONOCYTES NFR BLD: 10 % (ref 5–13)
NEUTS SEG # BLD: 5.5 K/UL (ref 1.8–8)
NEUTS SEG NFR BLD: 66 % (ref 32–75)
NRBC # BLD: 0 K/UL (ref 0–0.01)
NRBC BLD-RTO: 0 PER 100 WBC
P-R INTERVAL, ECG05: 164 MS
PLATELET # BLD AUTO: 149 K/UL (ref 150–400)
POTASSIUM SERPL-SCNC: 3.7 MMOL/L (ref 3.5–5.1)
Q-T INTERVAL, ECG07: 458 MS
QRS DURATION, ECG06: 88 MS
QTC CALCULATION (BEZET), ECG08: 501 MS
RBC # BLD AUTO: 3.54 M/UL (ref 3.8–5.2)
SODIUM SERPL-SCNC: 140 MMOL/L (ref 136–145)
TROPONIN I SERPL-MCNC: <0.05 NG/ML
VENTRICULAR RATE, ECG03: 72 BPM
WBC # BLD AUTO: 8.3 K/UL (ref 3.6–11)

## 2020-12-04 PROCEDURE — 99232 SBSQ HOSP IP/OBS MODERATE 35: CPT | Performed by: INTERNAL MEDICINE

## 2020-12-04 PROCEDURE — 84484 ASSAY OF TROPONIN QUANT: CPT

## 2020-12-04 PROCEDURE — 74011250636 HC RX REV CODE- 250/636: Performed by: INTERNAL MEDICINE

## 2020-12-04 PROCEDURE — 36415 COLL VENOUS BLD VENIPUNCTURE: CPT

## 2020-12-04 PROCEDURE — 74011000258 HC RX REV CODE- 258: Performed by: INTERNAL MEDICINE

## 2020-12-04 PROCEDURE — 65660000000 HC RM CCU STEPDOWN

## 2020-12-04 PROCEDURE — 74011250637 HC RX REV CODE- 250/637: Performed by: INTERNAL MEDICINE

## 2020-12-04 PROCEDURE — 74011250637 HC RX REV CODE- 250/637: Performed by: EMERGENCY MEDICINE

## 2020-12-04 PROCEDURE — 80048 BASIC METABOLIC PNL TOTAL CA: CPT

## 2020-12-04 PROCEDURE — 74011250637 HC RX REV CODE- 250/637: Performed by: NURSE PRACTITIONER

## 2020-12-04 PROCEDURE — 93005 ELECTROCARDIOGRAM TRACING: CPT

## 2020-12-04 PROCEDURE — 85025 COMPLETE CBC W/AUTO DIFF WBC: CPT

## 2020-12-04 PROCEDURE — 74011000250 HC RX REV CODE- 250: Performed by: NURSE PRACTITIONER

## 2020-12-04 PROCEDURE — 74011250637 HC RX REV CODE- 250/637

## 2020-12-04 RX ORDER — PANTOPRAZOLE SODIUM 40 MG/1
40 TABLET, DELAYED RELEASE ORAL
Status: DISCONTINUED | OUTPATIENT
Start: 2020-12-05 | End: 2020-12-10 | Stop reason: HOSPADM

## 2020-12-04 RX ORDER — AMLODIPINE BESYLATE 5 MG/1
5 TABLET ORAL DAILY
Status: DISCONTINUED | OUTPATIENT
Start: 2020-12-04 | End: 2020-12-04

## 2020-12-04 RX ORDER — TRAMADOL HYDROCHLORIDE 50 MG/1
50 TABLET ORAL
Status: DISCONTINUED | OUTPATIENT
Start: 2020-12-04 | End: 2020-12-10 | Stop reason: HOSPADM

## 2020-12-04 RX ORDER — AMLODIPINE BESYLATE 5 MG/1
10 TABLET ORAL DAILY
Status: DISCONTINUED | OUTPATIENT
Start: 2020-12-05 | End: 2020-12-10 | Stop reason: HOSPADM

## 2020-12-04 RX ORDER — AMLODIPINE BESYLATE 5 MG/1
5 TABLET ORAL DAILY
Status: COMPLETED | OUTPATIENT
Start: 2020-12-04 | End: 2020-12-04

## 2020-12-04 RX ADMIN — Medication 10 ML: at 06:38

## 2020-12-04 RX ADMIN — Medication 10 ML: at 14:54

## 2020-12-04 RX ADMIN — NITROGLYCERIN 1 INCH: 20 OINTMENT TOPICAL at 11:00

## 2020-12-04 RX ADMIN — TRAMADOL HYDROCHLORIDE 50 MG: 50 TABLET, COATED ORAL at 08:52

## 2020-12-04 RX ADMIN — MUPIROCIN: 20 OINTMENT TOPICAL at 08:46

## 2020-12-04 RX ADMIN — NITROGLYCERIN 1 INCH: 20 OINTMENT TOPICAL at 22:15

## 2020-12-04 RX ADMIN — CLONIDINE HYDROCHLORIDE 0.2 MG: 0.1 TABLET ORAL at 22:15

## 2020-12-04 RX ADMIN — HEPARIN SODIUM 5000 UNITS: 5000 INJECTION INTRAVENOUS; SUBCUTANEOUS at 08:46

## 2020-12-04 RX ADMIN — NITROGLYCERIN 1 INCH: 20 OINTMENT TOPICAL at 16:05

## 2020-12-04 RX ADMIN — LIDOCAINE HYDROCHLORIDE 40 ML: 20 SOLUTION ORAL; TOPICAL at 11:44

## 2020-12-04 RX ADMIN — VANCOMYCIN HYDROCHLORIDE 750 MG: 750 INJECTION, POWDER, LYOPHILIZED, FOR SOLUTION INTRAVENOUS at 00:35

## 2020-12-04 RX ADMIN — HEPARIN SODIUM 5000 UNITS: 5000 INJECTION INTRAVENOUS; SUBCUTANEOUS at 22:08

## 2020-12-04 RX ADMIN — ASPIRIN 81 MG: 81 TABLET, COATED ORAL at 08:46

## 2020-12-04 RX ADMIN — CEFEPIME HYDROCHLORIDE 2 G: 2 INJECTION, POWDER, FOR SOLUTION INTRAVENOUS at 06:38

## 2020-12-04 RX ADMIN — TRAMADOL HYDROCHLORIDE 50 MG: 50 TABLET, COATED ORAL at 15:01

## 2020-12-04 RX ADMIN — HYDRALAZINE HYDROCHLORIDE 10 MG: 20 INJECTION INTRAMUSCULAR; INTRAVENOUS at 08:46

## 2020-12-04 RX ADMIN — AMLODIPINE BESYLATE 5 MG: 5 TABLET ORAL at 11:44

## 2020-12-04 RX ADMIN — CEFEPIME HYDROCHLORIDE 2 G: 2 INJECTION, POWDER, FOR SOLUTION INTRAVENOUS at 17:15

## 2020-12-04 RX ADMIN — HYDRALAZINE HYDROCHLORIDE 10 MG: 20 INJECTION INTRAMUSCULAR; INTRAVENOUS at 15:01

## 2020-12-04 RX ADMIN — Medication 10 ML: at 22:09

## 2020-12-04 RX ADMIN — METRONIDAZOLE 500 MG: 500 INJECTION, SOLUTION INTRAVENOUS at 08:47

## 2020-12-04 RX ADMIN — CLONIDINE HYDROCHLORIDE 0.2 MG: 0.1 TABLET ORAL at 08:46

## 2020-12-04 RX ADMIN — ACETAMINOPHEN 650 MG: 325 TABLET ORAL at 06:45

## 2020-12-04 RX ADMIN — POTASSIUM CHLORIDE 20 MEQ: 20 TABLET, EXTENDED RELEASE ORAL at 08:46

## 2020-12-04 RX ADMIN — CLONIDINE HYDROCHLORIDE 0.2 MG: 0.1 TABLET ORAL at 16:05

## 2020-12-04 RX ADMIN — METRONIDAZOLE 500 MG: 500 INJECTION, SOLUTION INTRAVENOUS at 22:08

## 2020-12-04 RX ADMIN — AMLODIPINE BESYLATE 5 MG: 5 TABLET ORAL at 10:17

## 2020-12-04 NOTE — PROGRESS NOTES
[address] [place] [address]  [phone]      Cardiology Progress Note      12/4/2020 11:21 AM    Admit Date: 12/1/2020    Admit Diagnosis:   Cellulitis [L03.90]    Subjective:     Silvia Tsang c/o mid upper abd discomfort this AM.  /106, elevated all night.     ECG shows inverted T waves, no depression, troponin neg    Visit Vitals  BP (!) 186/106 (BP 1 Location: Right arm, BP Patient Position: At rest)   Pulse 70   Temp 97.2 °F (36.2 °C)   Resp 16   Ht 5' 4\" (1.626 m)   Wt 115 lb (52.2 kg)   SpO2 99%   Breastfeeding No   BMI 19.74 kg/m²       Current Facility-Administered Medications   Medication Dose Route Frequency    traMADoL (ULTRAM) tablet 50 mg  50 mg Oral Q6H PRN    nitroglycerin (NITROBID) 2 % ointment 1 Inch  1 Inch Topical TID    amLODIPine (NORVASC) tablet 5 mg  5 mg Oral DAILY    [Held by provider] carvediloL (COREG) tablet 6.25 mg  6.25 mg Oral BID WITH MEALS    cloNIDine HCL (CATAPRES) tablet 0.2 mg  0.2 mg Oral TID    hydrALAZINE (APRESOLINE) 20 mg/mL injection 10 mg  10 mg IntraVENous Q6H PRN    aspirin delayed-release tablet 81 mg  81 mg Oral DAILY    vancomycin (VANCOCIN) 750 mg in 0.9% sodium chloride 250 mL (VIAL-MATE)  750 mg IntraVENous Q36H    ALPRAZolam (XANAX) tablet 0.25 mg  0.25 mg Oral BID PRN    mupirocin (BACTROBAN) 2 % ointment   Topical DAILY    acetaminophen (TYLENOL) tablet 650 mg  650 mg Oral Q6H PRN    sodium chloride (NS) flush 5-40 mL  5-40 mL IntraVENous Q8H    sodium chloride (NS) flush 5-40 mL  5-40 mL IntraVENous PRN    polyethylene glycol (MIRALAX) packet 17 g  17 g Oral DAILY PRN    metroNIDAZOLE (FLAGYL) IVPB premix 500 mg  500 mg IntraVENous Q12H    ondansetron (ZOFRAN) injection 4 mg  4 mg IntraVENous Q6H PRN    heparin (porcine) injection 5,000 Units  5,000 Units SubCUTAneous Q12H    cefepime (MAXIPIME) 2 g in 0.9% sodium chloride (MBP/ADV) 100 mL MBP  2 g IntraVENous Q12H    potassium chloride (K-DUR, KLOR-CON) SR tablet 20 mEq  20 mEq Oral DAILY       Objective:      Physical Exam:  General Appearance:  elderly AAF in no acute distress  Chest:   Clear  Cardiovascular:  Regular rate and rhythm, no murmur. Abdomen:   Soft, non-tender, bowel sounds are active. Extremities: no peripheral edema;  LLE dressing  Skin:  Warm and dry. Data Review:   Recent Labs     12/04/20  0512 12/03/20 0458 12/02/20  0345   WBC 8.3 9.3 8.9   HGB 10.1* 9.5* 9.8*   HCT 31.9* 29.9* 30.9*   * 140* 136*     Recent Labs     12/04/20  0512 12/03/20  0458 12/02/20  0345 12/01/20  1130    140 143 140   K 3.7 3.6 2.9* 2.8*    108 107 103   CO2 24 27 31 30   * 143* 121* 135*   BUN 34* 27* 25* 24*   CREA 2.02* 1.90* 1.23* 1.35*   CA 8.5 8.5 8.8 8.6   MG  --  2.2  --   --    ALB  --   --   --  3.2*   TBILI  --   --   --  0.8   ALT  --   --   --  19       Recent Labs     12/04/20  1010 12/01/20  1130   TROIQ <0.05 <0.05         Intake/Output Summary (Last 24 hours) at 12/4/2020 1121  Last data filed at 12/4/2020 1390  Gross per 24 hour   Intake 795 ml   Output    Net 795 ml        Telemetry: SR, SB, type 2 AVB continues, but much less since stopping BB  EKG: SR with inverted Twaves    Assessment:     Principal Problem:    Cellulitis (12/1/2020)    Active Problems:    Cardiomyopathy ([facility] (88/6/7088)      Systolic heart failure ([facility] (12/3/2020)      [facility]FrEF ([facility] failure with reduced ejection fraction) [facility] (12/3/2020)      Hypertension, uncontrolled (12/3/2020)      Mobitz type 2 second degree atrioventricular block (12/3/2020)        Plan:   Newly dx cardiomyopathy with systolic heart failure (EF 35-40%)  Likely r/t long term uncontrolled HTN  No diuretics due to elevated renal function  Coreg now on hold with 2nd deg HB. Patient denies any previous hx and any symptoms  No ACEI due to renal dysfunction  Monitor I/O, daily weights, labs. Provide HF education    New onset chest discomfort:  R/t elevated BP? Supply/demand? Continues despite application of nitro patch  ECG with new T wave changes, but troponin neg  Managing BP and will start PPI, giving GI cocktail to see if improves symptoms  Further cardiac evaluation to be determined once she recovers from cellulitis, or sooner if you symptoms to not resolve     HTN:  Remains uncontrolled  Increasing Norvasc and managing with IV hydralazine prn  Continues on Clonidine . 2mg TID   Holding Coreg and other AV blocking medications with 2nd deg HB  No ACEI/ARB due to renal dysfuncton     2nd degree HB:  BB on hold, continue to monitor less arrhythmia, but continues           Elizabeth Robles ACNP  Cardiology

## 2020-12-04 NOTE — PROGRESS NOTES
End of Shift Note    Bedside shift change report given to Acosta Hardy RN (oncoming nurse) by Jasiel Driscoll RN (offgoing nurse). Report included the following information SBAR, Kardex, ED Summary, Intake/Output, MAR, Recent Results and Cardiac Rhythm (NSR)    Shift worked:  7a-7p     Shift summary and any significant changes:     Patient treated once for high blood pressure (see previous note), Patient also treated once for nausea. Patient expressed concern to family (who then called nursing staff) that patient was \"feeling anxious about not making it to morning. \" Dr Samantha Ware notified and new orders were received. Concerns for physician to address:  update family. Zone phone for oncoming shift:   [phone]       Activity:  Activity Level: Up with Assistance  Number times ambulated in hallways past shift: 0  Number of times OOB to chair past shift: 0    Cardiac:   Cardiac Monitoring: Yes      Cardiac Rhythm: Normal sinus rhythm    Access:   Current line(s): PIV     Genitourinary:   Urinary status: voiding and incontinent    Respiratory:      Chronic home O2 use?: NO  Incentive spirometer at bedside: NO     GI:  Last Bowel Movement Date: 12/03/20  Current diet:  DIET REGULAR  Passing flatus: YES  Tolerating current diet: YES  % Diet Eaten: 245 %    Pain Management:   Patient states pain is manageable on current regimen: YES    Skin:  Darien Score: 16  Interventions: turn team, float heels, increase time out of bed and foam dressing    Patient Safety:  Fall Score:  Total Score: 4  Interventions: bed/chair alarm, assistive device (walker, cane, etc), gripper socks, pt to call before getting OOB and stay with me (per policy)  High Fall Risk: Yes    Length of Stay:  Expected LOS: 4d 2h  Actual LOS: [address] [facility], RN

## 2020-12-04 NOTE — PROGRESS NOTES
Podiatry    Subjective: Pt relates several weeks of wounds bilateral feet. She does not recall any injury. The wounds have been treated with antibiotics with some improvement, but then worsened when the antibiotics were discontinued. No specific wound care was performed at home. Pt relates pain from toes, no new issues. Patient is a 66 y.o.  female who is being seen for wounds bilateral feet. Patient Active Problem List    Diagnosis Date Noted    Cardiomyopathy [facility]) 70/58/6111    Systolic heart failure ([facility] [address] 12/03/2020    HFrEF (heart failure with reduced ejection fraction) ([facility] [address] 12/03/2020    Hypertension, uncontrolled 12/03/2020    Mobitz type 2 second degree atrioventricular block 12/03/2020    Cellulitis 12/01/2020     Past Medical History:   Diagnosis Date    Cardiomyopathy [facility]) 12/3/2020    HFrEF (heart failure with reduced ejection fraction) ([facility] [address] 12/3/2020    Hypertension, uncontrolled 12/3/2020    Mobitz type 2 second degree atrioventricular block 80/4/8956    Systolic heart failure ([facility] [address] 12/3/2020      History reviewed. No pertinent surgical history.    Family History   Problem Relation Age of Onset    Diabetes Mother     Diabetes Father     Diabetes Son       Social History     Tobacco Use    Smoking status: Never Smoker   Substance Use Topics    Alcohol use: Never     Frequency: Never     Current Facility-Administered Medications   Medication Dose Route Frequency Provider Last Rate Last Dose    traMADoL (ULTRAM) tablet 50 mg  50 mg Oral Q6H PRN Jason Kennedy MD   50 mg at 12/04/20 0852    nitroglycerin (NITROBID) 2 % ointment 1 Inch  1 Inch Topical TID Jason Kennedy MD        amLODIPine (NORVASC) tablet 5 mg  5 mg Oral DAILY Jason Kennedy MD        [Held by provider] carvediloL (COREG) tablet 6.25 mg  6.25 mg Oral BID WITH MEALS Jason Kennedy MD        cloNIDine HCL (CATAPRES) tablet 0.2 mg  0.2 mg Oral TID Jason Kennedy MD   0.2 mg at 12/04/20 0846    hydrALAZINE (APRESOLINE) 20 mg/mL injection 10 mg  10 mg IntraVENous Q6H PRN Ilene Rodríguez MD   10 mg at 20 0846    aspirin delayed-release tablet 81 mg  81 mg Oral DAILY Ilene Rodríguez MD   81 mg at 20 0846    vancomycin (VANCOCIN) 750 mg in 0.9% sodium chloride 250 mL (VIAL-MATE)  750 mg IntraVENous Q36H Ilene Rodríguez MD   750 mg at 20 0035    ALPRAZolam (XANAX) tablet 0.25 mg  0.25 mg Oral BID PRN Ilene Rodríguez MD        mupirocin (BACTROBAN) 2 % ointment   Topical DAILY Sherman Aguilar DPM        acetaminophen (TYLENOL) tablet 650 mg  650 mg Oral Q6H PRN Victorino Redyd MD   650 mg at 20 0645    sodium chloride (NS) flush 5-40 mL  5-40 mL IntraVENous Q8H Billy Jolly, DO   10 mL at 20 3153    sodium chloride (NS) flush 5-40 mL  5-40 mL IntraVENous PRN Billy Jolly, DO        polyethylene glycol (MIRALAX) packet 17 g  17 g Oral DAILY PRN Longoria Sieve, DO   17 g at 20 0155    metroNIDAZOLE (FLAGYL) IVPB premix 500 mg  500 mg IntraVENous Q12H Longoria Sieve,  mL/hr at 20 0847 500 mg at 20 0847    ondansetron (ZOFRAN) injection 4 mg  4 mg IntraVENous Q6H PRN Longoria Sieve, DO   4 mg at 20 1358    heparin (porcine) injection 5,000 Units  5,000 Units SubCUTAneous Q12H Longoria Sieve, DO   5,000 Units at 20 0846    cefepime (MAXIPIME) 2 g in 0.9% sodium chloride (MBP/ADV) 100 mL MBP  2 g IntraVENous Q12H BroaddusSeretha Serge,  mL/hr at 20 0638 2 g at 20 2880    potassium chloride (K-DUR, KLOR-CON) SR tablet 20 mEq  20 mEq Oral DAILY Longoria Sieve, DO   20 mEq at 20 0846      No Known Allergies     Review of Systems:  CORNEL x3. NAD.     Objective:     Patient Vitals for the past 8 hrs:   BP Temp Pulse Resp SpO2   20 0824 (!) 186/106 97.2 °F (36.2 °C) 70 16 99 %   20 0305 (!) 155/89 97.8 °F (36.6 °C) 72 18 97 %     Temp (24hrs), Av.5 °F (36.4 °C), Min:97.2 °F (36.2 °C), Max:98 °F (36.7 °C)      Physical Exam:    DP and PT 2/4; CFT less than 3 seconds. Toes warm bilaterally. Pitting edema with lipodermatosclerosis bilateral legs. Edema both feet. Open lesion to dermis dorsal right mid-foot about 1 x 1.5 cm with local erythema and edema and scant pus drainage. Multiple open wounds to dermis on the left 2nd and 3rd toes, no drainage but the there is mal-odor. Severe local erythema and edema of the left 2nd toe. Sensation intact to light touch  Hammer toe deformity bilaterally. Mild TTP left 2nd toe. Lab Review:   Recent Results (from the past 24 hour(s))   CBC WITH AUTOMATED DIFF    Collection Time: 12/04/20  5:12 AM   Result Value Ref Range    WBC 8.3 3.6 - 11.0 K/uL    RBC 3.54 (L) 3.80 - 5.20 M/uL    HGB 10.1 (L) 11.5 - 16.0 g/dL    HCT 31.9 (L) 35.0 - 47.0 %    MCV 90.1 80.0 - 99.0 FL    MCH 28.5 26.0 - 34.0 PG    MCHC 31.7 30.0 - 36.5 g/dL    RDW 14.6 (H) 11.5 - 14.5 %    PLATELET 422 (L) 770 - 400 K/uL    NRBC 0.0 0  WBC    ABSOLUTE NRBC 0.00 0.00 - 0.01 K/uL    NEUTROPHILS 66 32 - 75 %    LYMPHOCYTES 23 12 - 49 %    MONOCYTES 10 5 - 13 %    EOSINOPHILS 1 0 - 7 %    BASOPHILS 1 0 - 1 %    IMMATURE GRANULOCYTES 1 (H) 0.0 - 0.5 %    ABS. NEUTROPHILS 5.5 1.8 - 8.0 K/UL    ABS. LYMPHOCYTES 1.9 0.8 - 3.5 K/UL    ABS. MONOCYTES 0.8 0.0 - 1.0 K/UL    ABS. EOSINOPHILS 0.1 0.0 - 0.4 K/UL    ABS. BASOPHILS 0.0 0.0 - 0.1 K/UL    ABS. IMM.  GRANS. 0.0 0.00 - 0.04 K/UL    DF AUTOMATED     METABOLIC PANEL, BASIC    Collection Time: 12/04/20  5:12 AM   Result Value Ref Range    Sodium 140 136 - 145 mmol/L    Potassium 3.7 3.5 - 5.1 mmol/L    Chloride 108 97 - 108 mmol/L    CO2 24 21 - 32 mmol/L    Anion gap 8 5 - 15 mmol/L    Glucose 131 (H) 65 - 100 mg/dL    BUN 34 (H) 6 - 20 MG/DL    Creatinine 2.02 (H) 0.55 - 1.02 MG/DL    BUN/Creatinine ratio 17 12 - 20      GFR est AA 29 (L) >60 ml/min/1.73m2    GFR est non-AA 24 (L) >60 ml/min/1.73m2    Calcium 8.5 8.5 - 10.1 MG/DL       Impression:   1. Open wounds to dermis bilateral feet, etiology uncertain. 2. Cellulitis left toes and right foot    PLAN:  No significant change in wounds. Continue antibiotics and dressing changes. I will continue to follow.

## 2020-12-04 NOTE — PROGRESS NOTES
Hospitalist Progress Note    NAME: Rashaad Frye   :  1945   MRN:  [id number]       Assessment / Plan:  LE cellulitis  MRI L foot neg for any fracture. No OM. Nonspecific diffuse subcutaneous edema. 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 R resting CARISSA is nl. R TBI 1.01.  L resting CARISSA is normal, L TIB 1.01  A1C 5.9  Procalcitonin <0.05, wound cx showed probably staph aureus  Will cont' empiric IV abx: vanc/cefepime and flagyl  Podiatry and wound care following, appreciate recs    New onset of cardiomyopathy, EF 35-40%  HTN  2nd deg AV block  Chest pain  Coreg discontinued due to 2nd deg heart block   Adding Norvasc, and nitrobid titrate prn  Cont' clonidine  Not on ACEi/ARBs due to TONJA  Obtain trop, EKG for chest pain workup  Cardiology following    TONJA, likely from diuretics and recent contrast use. Avoid nephrotoxic agents. Cr stable ~2.  Daily bmp    Hypokalemia, repleted    PNA on CXR  CXR showed R pleural effusion with basilar air space disease  probnp elevated  Pt already on IV abx as above  Lasix on hold   Monitor daily lytes, strict I&O    TONJA vs CKD3  Cr trending down, monitor      Code status: Full  Prophylaxis: Hep SQ  Recommended Disposition: TBD     Subjective:     Chief Complaint / Reason for Physician Visit  Pt seen, NAD. C/o cp during my encounter. Discussed with RN events overnight. Review of Systems:  Symptom Y/N Comments  Symptom Y/N Comments   Fever/Chills    Chest Pain     Poor Appetite    Edema     Cough    Abdominal Pain     Sputum    Joint Pain     SOB/LACY    Pruritis/Rash     Nausea/vomit    Tolerating PT/OT     Diarrhea    Tolerating Diet     Constipation    Other       Could NOT obtain due to:      Objective:     VITALS:   Last 24hrs VS reviewed since prior progress note.  Most recent are:  Patient Vitals for the past 24 hrs:   Temp Pulse Resp BP SpO2   20 0824 97.2 °F (36.2 °C) 70 16 (!) 186/106 99 %   12/04/20 0305 97.8 °F (36.6 °C) 72 18 (!) 155/89 97 %   12/04/20 0050 97.4 °F (36.3 °C) 69 16 (!) 185/103 95 %   12/03/20 2100    (!) 148/82    12/03/20 2000 97.4 °F (36.3 °C) 69 18 (!) 184/104 99 %   12/03/20 1529 97.3 °F (36.3 °C) 64 16 (!) 155/74 98 %   12/03/20 1154 98 °F (36.7 °C) 66 17 (!) 165/77 100 %       Intake/Output Summary (Last 24 hours) at 12/4/2020 1000  Last data filed at 12/4/2020 1458  Gross per 24 hour   Intake 795 ml   Output    Net 795 ml        I had a face to face encounter and independently examined this patient on 12/4/2020, as outlined below:  PHYSICAL EXAM:  General: WD, WN. Alert, cooperative, no acute distress    EENT:  EOMI. Anicteric sclerae. MMM  Resp:  CTA bilaterally, no wheezing or rales. No accessory muscle use  CV:  Regular  rhythm,  No edema  GI:  Soft, Non distended, Non tender. +BS  Neurologic:  Alert and oriented X 3, normal speech  Psych:   Not anxious nor agitated  Skin:  Dry scabs on b/l LE. Black discoloration of L 2nd toe. Ulceration on R dorsal forefoot. Reviewed most current lab test results and cultures  YES  Reviewed most current radiology test results   YES  Review and summation of old records today    NO  Reviewed patient's current orders and MAR    YES  PMH/SH reviewed - no change compared to H&P  ________________________________________________________________________  Care Plan discussed with:    Comments   Patient x    Family      RN x    Care Manager     Consultant  x cardiology                     Multidiciplinary team rounds were held today with , nursing, pharmacist and clinical coordinator. Patient's plan of care was discussed; medications were reviewed and discharge planning was addressed.      ________________________________________________________________________  Total NON critical care TIME: 35 Minutes    Total CRITICAL CARE TIME Spent:   Minutes non procedure based      Comments   >50% of visit spent in counseling and coordination of care     ________________________________________________________________________  Cheri Shah MD     Procedures: see electronic medical records for all procedures/Xrays and details which were not copied into this note but were reviewed prior to creation of Plan. LABS:  I reviewed today's most current labs and imaging studies.   Pertinent labs include:  Recent Labs     12/04/20 0512 12/03/20 0458 12/02/20 0345   WBC 8.3 9.3 8.9   HGB 10.1* 9.5* 9.8*   HCT 31.9* 29.9* 30.9*   * 140* 136*     Recent Labs     12/04/20 0512 12/03/20 0458 12/02/20 0345 12/01/20  1130    140 143 140   K 3.7 3.6 2.9* 2.8*    108 107 103   CO2 24 27 31 30   * 143* 121* 135*   BUN 34* 27* 25* 24*   CREA 2.02* 1.90* 1.23* 1.35*   CA 8.5 8.5 8.8 8.6   MG  --  2.2  --   --    ALB  --   --   --  3.2*   TBILI  --   --   --  0.8   ALT  --   --   --  19       Signed: Cheri Shah MD

## 2020-12-04 NOTE — PROGRESS NOTES
[facility]      [address], [address]  [phone]      Cardiology Progress Note      12/4/2020 10:47 AM    Admit Date: 12/1/2020    Admit Diagnosis:   Cellulitis [L03.90]    Subjective:     Dominic Sequin     Vague CP    Visit Vitals  BP (!) 186/106 (BP 1 Location: Right arm, BP Patient Position: At rest)   Pulse 70   Temp 97.2 °F (36.2 °C)   Resp 16   Ht 5' 4\" (1.626 m)   Wt 115 lb (52.2 kg)   SpO2 99%   Breastfeeding No   BMI 19.74 kg/m²       Current Facility-Administered Medications   Medication Dose Route Frequency    traMADoL (ULTRAM) tablet 50 mg  50 mg Oral Q6H PRN    nitroglycerin (NITROBID) 2 % ointment 1 Inch  1 Inch Topical TID    amLODIPine (NORVASC) tablet 5 mg  5 mg Oral DAILY    [Held by provider] carvediloL (COREG) tablet 6.25 mg  6.25 mg Oral BID WITH MEALS    cloNIDine HCL (CATAPRES) tablet 0.2 mg  0.2 mg Oral TID    hydrALAZINE (APRESOLINE) 20 mg/mL injection 10 mg  10 mg IntraVENous Q6H PRN    aspirin delayed-release tablet 81 mg  81 mg Oral DAILY    vancomycin (VANCOCIN) 750 mg in 0.9% sodium chloride 250 mL (VIAL-MATE)  750 mg IntraVENous Q36H    ALPRAZolam (XANAX) tablet 0.25 mg  0.25 mg Oral BID PRN    mupirocin (BACTROBAN) 2 % ointment   Topical DAILY    acetaminophen (TYLENOL) tablet 650 mg  650 mg Oral Q6H PRN    sodium chloride (NS) flush 5-40 mL  5-40 mL IntraVENous Q8H    sodium chloride (NS) flush 5-40 mL  5-40 mL IntraVENous PRN    polyethylene glycol (MIRALAX) packet 17 g  17 g Oral DAILY PRN    metroNIDAZOLE (FLAGYL) IVPB premix 500 mg  500 mg IntraVENous Q12H    ondansetron (ZOFRAN) injection 4 mg  4 mg IntraVENous Q6H PRN    heparin (porcine) injection 5,000 Units  5,000 Units SubCUTAneous Q12H    cefepime (MAXIPIME) 2 g in 0.9% sodium chloride (MBP/ADV) 100 mL MBP  2 g IntraVENous Q12H    potassium chloride (K-DUR, KLOR-CON) SR tablet 20 mEq  20 mEq Oral DAILY       Objective:      Physical Exam:  General Appearance:    Chest:   Clear  Cardiovascular: RRR  Extremities: 2+ edema  Skin:  Warm and dry.     Data Review:   Recent Labs     12/04/20  0512 12/03/20  0458 12/02/20  0345   WBC 8.3 9.3 8.9   HGB 10.1* 9.5* 9.8*   HCT 31.9* 29.9* 30.9*   * 140* 136*     Recent Labs     12/04/20  0512 12/03/20  0458 12/02/20  0345 12/01/20  1130    140 143 140   K 3.7 3.6 2.9* 2.8*    108 107 103   CO2 24 27 31 30   * 143* 121* 135*   BUN 34* 27* 25* 24*   CREA 2.02* 1.90* 1.23* 1.35*   CA 8.5 8.5 8.8 8.6   MG  --  2.2  --   --    ALB  --   --   --  3.2*   TBILI  --   --   --  0.8   ALT  --   --   --  19       Recent Labs     12/01/20  1130   TROIQ <0.05         Intake/Output Summary (Last 24 hours) at 12/4/2020 1047  Last data filed at 12/4/2020 1926  Gross per 24 hour   Intake 795 ml   Output    Net 795 ml        Telemetry:   EKG:  Cxray:    Assessment:     Principal Problem:    Cellulitis (12/1/2020)    Active Problems:    Cardiomyopathy ([facility] [address] (53/3/8794)      Systolic heart failure ([facility] [address] (12/3/2020)      HFrEF (heart failure with reduced ejection fraction) ([facility] [address] (12/3/2020)      Hypertension, uncontrolled (12/3/2020)      Mobitz type 2 second degree atrioventricular block (12/3/2020)        Plan:     Agree with amlodipine.   Check EKG and trop for CP    Maritza Cardenas M.D., [facility]

## 2020-12-04 NOTE — PROGRESS NOTES
0825: /109. MD Tarsha Frank notified. Hydralazine administered. 1100: Notified from MD Zamudio patients complaint of chest pain. Nitro administered. EKG and troponin collected at this time. 1130: BP still elevated at this time per Roland Gonzalez, NP another amlodipine and GI cocktail administered. 1501: Patients BP elevated again, another PRN hydralazine administered. Will continue to monitor. End of Shift Note    Bedside shift change report given to Bailee Taylor RN (oncoming nurse) by Gianni Boyle RN (offgoing nurse). Report included the following information SBAR, Kardex, ED Summary, Intake/Output, MAR, Recent Results and Cardiac Rhythm (NSR)    Shift worked:  7a-7p     Shift summary and any significant changes:     See notes above for chest pain and elevated BP. Patient rested in bed all shift. Pain treated twice; see MAR. Patient had family at bedside during early afternoon. New IV started on left arm. Concerns for physician to address: n/a   [facility] phone for oncoming shift:  [phone]     Activity:  Activity Level: Up with Assistance  Number times ambulated in hallways past shift: 0  Number of times OOB to chair past shift: 0    Cardiac:   Cardiac Monitoring: Yes      Cardiac Rhythm: Normal sinus rhythm    Access:   Current line(s): PIV     Genitourinary:   Urinary status: voiding    Respiratory:   O2 Device: Room air  Chronic home O2 use?: NO  Incentive spirometer at bedside: N/A     GI:  Last Bowel Movement Date: 12/03/20  Current diet:  DIET CARDIAC Regular  Passing flatus: YES  Tolerating current diet: YES  % Diet Eaten: 30 %    Pain Management:   Patient states pain is manageable on current regimen: YES    Skin:  Darien Score: 16  Interventions: turn team, float heels, increase time out of bed and limit briefs    Patient Safety:  Fall Score:  Total Score: 4  Interventions: bed/chair alarm, assistive device (walker, cane, etc), gripper socks, pt to call before getting OOB, stay with me (per policy) and gait belt  High Fall Risk: Yes    Length of Stay:  Expected LOS: 4d 2h  Actual LOS: [address], RN

## 2020-12-04 NOTE — PROGRESS NOTES
End of Shift Note    Bedside shift change report given to Nella Panda RN (oncoming nurse) by Misael Delgado RN (offgoing nurse). Report included the following information SBAR, Kardex, ED Summary, Intake/Output, MAR, Recent Results and Cardiac Rhythm (NSR)    Shift worked:  7p-7a     Shift summary and any significant changes:     Patient treated once for high blood pressure. Patient rested well overnight. Concerns for physician to address: na     [employer] phone for oncoming shift:   [phone]       Activity:  Activity Level: Up with Assistance  Number times ambulated in hallways past shift: 0  Number of times OOB to chair past shift: 0    Cardiac:   Cardiac Monitoring: Yes      Cardiac Rhythm: Normal sinus rhythm    Access:   Current line(s): PIV     Genitourinary:   Urinary status: voiding and incontinent    Respiratory:   O2 Device: Room air  Chronic home O2 use?: NO  Incentive spirometer at bedside: NO     GI:  Last Bowel Movement Date: 12/03/20  Current diet:  DIET CARDIAC Regular  Passing flatus: YES  Tolerating current diet: YES  % Diet Eaten: 245 %    Pain Management:   Patient states pain is manageable on current regimen: YES    Skin:  Darien Score: 16  Interventions: turn team, float heels, increase time out of bed and foam dressing    Patient Safety:  Fall Score:  Total Score: 4  Interventions: bed/chair alarm, assistive device (walker, cane, etc), gripper socks, pt to call before getting OOB and stay with me (per policy)  High Fall Risk: Yes    Length of Stay:  Expected LOS: 4d 2h  Actual LOS: 3      Sheryl Waldron RN

## 2020-12-05 LAB
ANION GAP SERPL CALC-SCNC: 8 MMOL/L (ref 5–15)
BACTERIA SPEC CULT: ABNORMAL
BACTERIA SPEC CULT: ABNORMAL
BASOPHILS # BLD: 0 K/UL (ref 0–0.1)
BASOPHILS NFR BLD: 0 % (ref 0–1)
BUN SERPL-MCNC: 43 MG/DL (ref 6–20)
BUN/CREAT SERPL: 24 (ref 12–20)
CALCIUM SERPL-MCNC: 8.3 MG/DL (ref 8.5–10.1)
CHLORIDE SERPL-SCNC: 107 MMOL/L (ref 97–108)
CO2 SERPL-SCNC: 24 MMOL/L (ref 21–32)
CREAT SERPL-MCNC: 1.8 MG/DL (ref 0.55–1.02)
DATE LAST DOSE: ABNORMAL
DIFFERENTIAL METHOD BLD: ABNORMAL
EOSINOPHIL # BLD: 0 K/UL (ref 0–0.4)
EOSINOPHIL NFR BLD: 0 % (ref 0–7)
ERYTHROCYTE [DISTWIDTH] IN BLOOD BY AUTOMATED COUNT: 14.8 % (ref 11.5–14.5)
GLUCOSE SERPL-MCNC: 120 MG/DL (ref 65–100)
GRAM STN SPEC: ABNORMAL
HCT VFR BLD AUTO: 29.4 % (ref 35–47)
HGB BLD-MCNC: 9.5 G/DL (ref 11.5–16)
IMM GRANULOCYTES # BLD AUTO: 0.1 K/UL (ref 0–0.04)
IMM GRANULOCYTES NFR BLD AUTO: 1 % (ref 0–0.5)
LYMPHOCYTES # BLD: 1.2 K/UL (ref 0.8–3.5)
LYMPHOCYTES NFR BLD: 12 % (ref 12–49)
MCH RBC QN AUTO: 29.1 PG (ref 26–34)
MCHC RBC AUTO-ENTMCNC: 32.3 G/DL (ref 30–36.5)
MCV RBC AUTO: 89.9 FL (ref 80–99)
MONOCYTES # BLD: 0.7 K/UL (ref 0–1)
MONOCYTES NFR BLD: 7 % (ref 5–13)
NEUTS SEG # BLD: 8 K/UL (ref 1.8–8)
NEUTS SEG NFR BLD: 80 % (ref 32–75)
NRBC # BLD: 0 K/UL (ref 0–0.01)
NRBC BLD-RTO: 0 PER 100 WBC
PLATELET # BLD AUTO: 159 K/UL (ref 150–400)
POTASSIUM SERPL-SCNC: 3.8 MMOL/L (ref 3.5–5.1)
RBC # BLD AUTO: 3.27 M/UL (ref 3.8–5.2)
REPORTED DOSE,DOSE: ABNORMAL UNITS
REPORTED DOSE/TIME,TMG: 0
SERVICE CMNT-IMP: ABNORMAL
SODIUM SERPL-SCNC: 139 MMOL/L (ref 136–145)
VANCOMYCIN TROUGH SERPL-MCNC: 11 UG/ML (ref 5–10)
WBC # BLD AUTO: 10.1 K/UL (ref 3.6–11)

## 2020-12-05 PROCEDURE — 36415 COLL VENOUS BLD VENIPUNCTURE: CPT

## 2020-12-05 PROCEDURE — 80202 ASSAY OF VANCOMYCIN: CPT

## 2020-12-05 PROCEDURE — 85025 COMPLETE CBC W/AUTO DIFF WBC: CPT

## 2020-12-05 PROCEDURE — 99233 SBSQ HOSP IP/OBS HIGH 50: CPT | Performed by: INTERNAL MEDICINE

## 2020-12-05 PROCEDURE — 74011250637 HC RX REV CODE- 250/637: Performed by: INTERNAL MEDICINE

## 2020-12-05 PROCEDURE — 65660000000 HC RM CCU STEPDOWN

## 2020-12-05 PROCEDURE — 80048 BASIC METABOLIC PNL TOTAL CA: CPT

## 2020-12-05 PROCEDURE — 74011250637 HC RX REV CODE- 250/637: Performed by: NURSE PRACTITIONER

## 2020-12-05 PROCEDURE — 74011250636 HC RX REV CODE- 250/636: Performed by: INTERNAL MEDICINE

## 2020-12-05 PROCEDURE — 74011000258 HC RX REV CODE- 258: Performed by: INTERNAL MEDICINE

## 2020-12-05 RX ORDER — HYDRALAZINE HYDROCHLORIDE 25 MG/1
25 TABLET, FILM COATED ORAL 3 TIMES DAILY
Status: DISCONTINUED | OUTPATIENT
Start: 2020-12-05 | End: 2020-12-06

## 2020-12-05 RX ADMIN — TRAMADOL HYDROCHLORIDE 50 MG: 50 TABLET, COATED ORAL at 22:15

## 2020-12-05 RX ADMIN — Medication 10 ML: at 22:29

## 2020-12-05 RX ADMIN — METRONIDAZOLE 500 MG: 500 INJECTION, SOLUTION INTRAVENOUS at 09:31

## 2020-12-05 RX ADMIN — HYDRALAZINE HYDROCHLORIDE 10 MG: 20 INJECTION INTRAMUSCULAR; INTRAVENOUS at 05:09

## 2020-12-05 RX ADMIN — POTASSIUM CHLORIDE 20 MEQ: 20 TABLET, EXTENDED RELEASE ORAL at 09:32

## 2020-12-05 RX ADMIN — HEPARIN SODIUM 5000 UNITS: 5000 INJECTION INTRAVENOUS; SUBCUTANEOUS at 22:15

## 2020-12-05 RX ADMIN — HYDRALAZINE HYDROCHLORIDE 25 MG: 25 TABLET, FILM COATED ORAL at 22:21

## 2020-12-05 RX ADMIN — ASPIRIN 81 MG: 81 TABLET, COATED ORAL at 09:32

## 2020-12-05 RX ADMIN — HYDRALAZINE HYDROCHLORIDE 25 MG: 25 TABLET, FILM COATED ORAL at 16:14

## 2020-12-05 RX ADMIN — TRAMADOL HYDROCHLORIDE 50 MG: 50 TABLET, COATED ORAL at 10:42

## 2020-12-05 RX ADMIN — NITROGLYCERIN 1 INCH: 20 OINTMENT TOPICAL at 09:32

## 2020-12-05 RX ADMIN — TRAMADOL HYDROCHLORIDE 50 MG: 50 TABLET, COATED ORAL at 16:14

## 2020-12-05 RX ADMIN — METRONIDAZOLE 500 MG: 500 INJECTION, SOLUTION INTRAVENOUS at 22:28

## 2020-12-05 RX ADMIN — CEFEPIME HYDROCHLORIDE 2 G: 2 INJECTION, POWDER, FOR SOLUTION INTRAVENOUS at 05:08

## 2020-12-05 RX ADMIN — PANTOPRAZOLE SODIUM 40 MG: 40 TABLET, DELAYED RELEASE ORAL at 06:18

## 2020-12-05 RX ADMIN — CLONIDINE HYDROCHLORIDE 0.2 MG: 0.1 TABLET ORAL at 22:21

## 2020-12-05 RX ADMIN — MUPIROCIN: 20 OINTMENT TOPICAL at 09:32

## 2020-12-05 RX ADMIN — HYDRALAZINE HYDROCHLORIDE 25 MG: 25 TABLET, FILM COATED ORAL at 10:11

## 2020-12-05 RX ADMIN — NITROGLYCERIN 1 INCH: 20 OINTMENT TOPICAL at 16:14

## 2020-12-05 RX ADMIN — NITROGLYCERIN 1 INCH: 20 OINTMENT TOPICAL at 22:28

## 2020-12-05 RX ADMIN — HEPARIN SODIUM 5000 UNITS: 5000 INJECTION INTRAVENOUS; SUBCUTANEOUS at 09:32

## 2020-12-05 RX ADMIN — AMLODIPINE BESYLATE 10 MG: 5 TABLET ORAL at 09:32

## 2020-12-05 RX ADMIN — CLONIDINE HYDROCHLORIDE 0.2 MG: 0.1 TABLET ORAL at 16:14

## 2020-12-05 RX ADMIN — VANCOMYCIN HYDROCHLORIDE 750 MG: 750 INJECTION, POWDER, LYOPHILIZED, FOR SOLUTION INTRAVENOUS at 11:28

## 2020-12-05 RX ADMIN — Medication 10 ML: at 05:09

## 2020-12-05 RX ADMIN — Medication 10 ML: at 13:55

## 2020-12-05 RX ADMIN — CLONIDINE HYDROCHLORIDE 0.2 MG: 0.1 TABLET ORAL at 09:00

## 2020-12-05 NOTE — PROGRESS NOTES
Problem: Falls - Risk of  Goal: *Absence of Falls  Description: Document Lula Gomes Fall Risk and appropriate interventions in the flowsheet. Outcome: Progressing Towards Goal  Note: Fall Risk Interventions:  Mobility Interventions: Communicate number of staff needed for ambulation/transfer, Bed/chair exit alarm, Patient to call before getting OOB, PT Consult for mobility concerns, Utilize walker, cane, or other assistive device         Medication Interventions: Patient to call before getting OOB, Teach patient to arise slowly    Elimination Interventions: Call light in reach, Patient to call for help with toileting needs, Stay With Me (per policy), Toilet paper/wipes in reach, Toileting schedule/hourly rounds    History of Falls Interventions: Bed/chair exit alarm, Vital signs minimum Q4HRs X 24 hrs (comment for end date), Room close to nurse's station         Problem: Patient Education: Go to Patient Education Activity  Goal: Patient/Family Education  Outcome: Progressing Towards Goal     Problem: Pressure Injury - Risk of  Goal: *Prevention of pressure injury  Description: Document Darien Scale and appropriate interventions in the flowsheet. Outcome: Progressing Towards Goal  Note: Pressure Injury Interventions:  Sensory Interventions: Assess changes in LOC, Float heels, Keep linens dry and wrinkle-free, Discuss PT/OT consult with provider, Turn and reposition approx. every two hours (pillows and wedges if needed)    Moisture Interventions: Absorbent underpads    Activity Interventions: Increase time out of bed, Pressure redistribution bed/mattress(bed type)    Mobility Interventions: HOB 30 degrees or less, Float heels, Pressure redistribution bed/mattress (bed type), Turn and reposition approx.  every two hours(pillow and wedges)    Nutrition Interventions: Document food/fluid/supplement intake    Friction and Shear Interventions: Feet elevated on foot rest, Foam dressings/transparent film/skin sealants, HOB 30 degrees or less, Lift team/patient mobility team, Lift sheet                Problem: Patient Education: Go to Patient Education Activity  Goal: Patient/Family Education  Outcome: Progressing Towards Goal

## 2020-12-05 NOTE — PROGRESS NOTES
Cardiology Progress Note      12/5/2020 11:05 AM    Admit Date: 12/1/2020    Admit Diagnosis: Cellulitis [L03.90]      Subjective:     Danielle Gonzalez c/o foot pain. No SOB. Hydralazine added for BP.     Visit Vitals  BP (!) 189/85   Pulse 77   Temp 98 °F (36.7 °C)   Resp 17   Ht 5' 4\" (1.626 m)   Wt 115 lb (52.2 kg)   SpO2 98%   Breastfeeding No   BMI 19.74 kg/m²       Current Facility-Administered Medications   Medication Dose Route Frequency    hydrALAZINE (APRESOLINE) tablet 25 mg  25 mg Oral TID    traMADoL (ULTRAM) tablet 50 mg  50 mg Oral Q6H PRN    nitroglycerin (NITROBID) 2 % ointment 1 Inch  1 Inch Topical TID    amLODIPine (NORVASC) tablet 10 mg  10 mg Oral DAILY    pantoprazole (PROTONIX) tablet 40 mg  40 mg Oral ACB    cloNIDine HCL (CATAPRES) tablet 0.2 mg  0.2 mg Oral TID    hydrALAZINE (APRESOLINE) 20 mg/mL injection 10 mg  10 mg IntraVENous Q6H PRN    aspirin delayed-release tablet 81 mg  81 mg Oral DAILY    vancomycin (VANCOCIN) 750 mg in 0.9% sodium chloride 250 mL (VIAL-MATE)  750 mg IntraVENous Q36H    ALPRAZolam (XANAX) tablet 0.25 mg  0.25 mg Oral BID PRN    mupirocin (BACTROBAN) 2 % ointment   Topical DAILY    acetaminophen (TYLENOL) tablet 650 mg  650 mg Oral Q6H PRN    sodium chloride (NS) flush 5-40 mL  5-40 mL IntraVENous Q8H    sodium chloride (NS) flush 5-40 mL  5-40 mL IntraVENous PRN    polyethylene glycol (MIRALAX) packet 17 g  17 g Oral DAILY PRN    metroNIDAZOLE (FLAGYL) IVPB premix 500 mg  500 mg IntraVENous Q12H    ondansetron (ZOFRAN) injection 4 mg  4 mg IntraVENous Q6H PRN    heparin (porcine) injection 5,000 Units  5,000 Units SubCUTAneous Q12H    cefepime (MAXIPIME) 2 g in 0.9% sodium chloride (MBP/ADV) 100 mL MBP  2 g IntraVENous Q12H    potassium chloride (K-DUR, KLOR-CON) SR tablet 20 mEq  20 mEq Oral DAILY         Objective:      Physical Exam:  Visit Vitals  BP (!) 189/85   Pulse 77   Temp 98 °F (36.7 °C)   Resp 17   Ht 5' 4\" (1.626 m)   Wt 115 lb (52.2 kg)   SpO2 98%   Breastfeeding No   BMI 19.74 kg/m²     General Appearance:  Well developed, well nourished,alert and oriented x 3, and individual in no acute distress. Ears/Nose/Mouth/Throat:   Hearing grossly normal.         Neck: Supple. Chest:   Lungs clear to auscultation bilaterally. Cardiovascular:  Regular rate and rhythm, S1, S2 normal, no murmur. Abdomen:   Soft, non-tender, bowel sounds are active. Extremities: No edema bilaterally. Skin: Warm and dry. Data Review:   Labs:    Recent Results (from the past 24 hour(s))   METABOLIC PANEL, BASIC    Collection Time: 12/05/20  2:27 AM   Result Value Ref Range    Sodium 139 136 - 145 mmol/L    Potassium 3.8 3.5 - 5.1 mmol/L    Chloride 107 97 - 108 mmol/L    CO2 24 21 - 32 mmol/L    Anion gap 8 5 - 15 mmol/L    Glucose 120 (H) 65 - 100 mg/dL    BUN 43 (H) 6 - 20 MG/DL    Creatinine 1.80 (H) 0.55 - 1.02 MG/DL    BUN/Creatinine ratio 24 (H) 12 - 20      GFR est AA 33 (L) >60 ml/min/1.73m2    GFR est non-AA 27 (L) >60 ml/min/1.73m2    Calcium 8.3 (L) 8.5 - 10.1 MG/DL   CBC WITH AUTOMATED DIFF    Collection Time: 12/05/20  2:27 AM   Result Value Ref Range    WBC 10.1 3.6 - 11.0 K/uL    RBC 3.27 (L) 3.80 - 5.20 M/uL    HGB 9.5 (L) 11.5 - 16.0 g/dL    HCT 29.4 (L) 35.0 - 47.0 %    MCV 89.9 80.0 - 99.0 FL    MCH 29.1 26.0 - 34.0 PG    MCHC 32.3 30.0 - 36.5 g/dL    RDW 14.8 (H) 11.5 - 14.5 %    PLATELET 067 432 - 885 K/uL    NRBC 0.0 0  WBC    ABSOLUTE NRBC 0.00 0.00 - 0.01 K/uL    NEUTROPHILS 80 (H) 32 - 75 %    LYMPHOCYTES 12 12 - 49 %    MONOCYTES 7 5 - 13 %    EOSINOPHILS 0 0 - 7 %    BASOPHILS 0 0 - 1 %    IMMATURE GRANULOCYTES 1 (H) 0.0 - 0.5 %    ABS. NEUTROPHILS 8.0 1.8 - 8.0 K/UL    ABS. LYMPHOCYTES 1.2 0.8 - 3.5 K/UL    ABS. MONOCYTES 0.7 0.0 - 1.0 K/UL    ABS. EOSINOPHILS 0.0 0.0 - 0.4 K/UL    ABS. BASOPHILS 0.0 0.0 - 0.1 K/UL    ABS. IMM.  GRANS. 0.1 (H) 0.00 - 0.04 K/UL    DF AUTOMATED         Telemetry: normal sinus rhythm      Assessment:     Hospital Problems  Date Reviewed: 12/3/2020          Codes Class Noted POA    Cardiomyopathy ([facility] ICD-10-CM: I42.9  ICD-9-CM: 425.4  12/3/2020 Unknown        Systolic heart failure ([facility].) ICD-10-CM: I50.20  ICD-9-CM: 428.20  12/3/2020 Unknown        HFrEF (heart failure with reduced ejection fraction) ([facility] ICD-10-CM: I50.20  ICD-9-CM: 428.20  12/3/2020 Unknown        Hypertension, uncontrolled ICD-10-CM: I10  ICD-9-CM: 401.9  12/3/2020 Unknown        Mobitz type 2 second degree atrioventricular block ICD-10-CM: I44.1  ICD-9-CM: 426.12  12/3/2020 Unknown        * (Principal) Cellulitis ICD-10-CM: L03.90  ICD-9-CM: 682.9  12/1/2020 Unknown              Plan:     Continue current therapy. BP management. No beta blocker due to AV block. No ACEI/ARB due to TONJA. Stress test next week.     Lilian Rivera MD

## 2020-12-05 NOTE — ROUTINE PROCESS
End of Shift Note    Bedside shift change report given to  (oncoming nurse) by Vanessa Buckner RN (offgoing nurse). Report included the following information SBAR    Shift worked:  7p-7a     Shift summary and any significant changes:          Concerns for physician to address:       Zone phone for oncoming shift:   [phone]       Activity:  Activity Level: Up with Assistance  Number times ambulated in hallways past shift: 0  Number of times OOB to chair past shift: 0    Cardiac:   Cardiac Monitoring: Yes      Cardiac Rhythm: Normal sinus rhythm    Access:   Current line(s): PIV     Genitourinary:   Urinary status: voiding    Respiratory:   O2 Device: Room air  Chronic home O2 use?: NO  Incentive spirometer at bedside: YES     GI:  Last Bowel Movement Date: 12/03/20  Current diet:  DIET CARDIAC Regular  Passing flatus: YES  Tolerating current diet: YES  % Diet Eaten: 40 %    Pain Management:   Patient states pain is manageable on current regimen: YES    Skin:  Darien Score: 15  Interventions: float heels, PT/OT consult, limit briefs and internal/external urinary devices    Patient Safety:  Fall Score:  Total Score: 4  Interventions: bed/chair alarm, assistive device (walker, cane, etc), gripper socks, pt to call before getting OOB and stay with me (per policy)  High Fall Risk: Yes    Length of Stay:  Expected LOS: 4d 2h  Actual LOS: [address], RN

## 2020-12-05 NOTE — PROGRESS NOTES
Hospitalist Progress Note    NAME: Danielle Gonzalez   :  1945   MRN:  [id number]       Assessment / Plan:  LE cellulitis  MRI L foot neg for any fracture. No OM. Nonspecific diffuse subcutaneous edema. 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 R resting CARISSA is nl. R TBI 1.01.  L resting CARISSA is normal, L TIB 1.01  A1C 5.9  Procalcitonin <0.05, wound cx showed probably staph aureus  Will cont' empiric IV abx: vanc/cefepime and flagyl  Podiatry and wound care following, appreciate recs  Up in chair, out of bed    PT/OT  New onset of cardiomyopathy, EF 35-40%  HTN  2nd deg AV block  Chest pain  Coreg discontinued due to 2nd deg heart block seen on tele  Trop neg, EKG without acute changes  Cont' Norvasc, nitrobid. Add PO hydralazine, titrate prn  Cont' clonidine  Not on ACEi/ARBs due to OTNJA  Cardiology following    TONJA, likely from diuretics and recent contrast use. Avoid nephrotoxic agents. Cr improving    Hypokalemia, repleted    PNA on CXR  CXR showed R pleural effusion with basilar air space disease  probnp elevated   Pt already on IV abx as above  Lasix on hold   Monitor daily lytes, strict I&O    TONJA vs CKD3  Cr trending down, monitor      Code status: Full  Prophylaxis: Hep SQ  Recommended Disposition: TBD     Subjective:     Chief Complaint / Reason for Physician Visit  Pt seen, NAD. Complains of L chest pain. Falls back asleep during my encounter. Hasn't been out of bed since admission. Discussed with RN events overnight. Review of Systems:  Symptom Y/N Comments  Symptom Y/N Comments   Fever/Chills    Chest Pain     Poor Appetite    Edema     Cough    Abdominal Pain     Sputum    Joint Pain     SOB/LACY    Pruritis/Rash     Nausea/vomit    Tolerating PT/OT     Diarrhea    Tolerating Diet     Constipation    Other       Could NOT obtain due to:      Objective:     VITALS:   Last 24hrs VS reviewed since prior progress note.  Most recent are:  Patient Vitals for the past 24 hrs:   Temp Pulse Resp BP SpO2   12/05/20 0822 98 °F (36.7 °C) 77 17 (!) 189/85 98 %   12/05/20 0516  67  (!) 159/71    12/05/20 0509  68  (!) 172/76    12/05/20 0419 97.5 °F (36.4 °C) 73 17 (!) 176/75 100 %   12/04/20 2330 97.9 °F (36.6 °C) 73 18 (!) 163/90 95 %   12/04/20 2215  74  (!) 163/85    12/04/20 2034 97.3 °F (36.3 °C) 75 18 (!) 155/75 97 %   12/04/20 1515    (!) 163/76    12/04/20 1430 97.4 °F (36.3 °C) 73 17 (!) 184/91 98 %   12/04/20 1130 97.3 °F (36.3 °C) 71 17 (!) 174/86 100 %       Intake/Output Summary (Last 24 hours) at 12/5/2020 0289  Last data filed at 12/4/2020 1836  Gross per 24 hour   Intake 420 ml   Output    Net 420 ml        I had a face to face encounter and independently examined this patient on 12/5/2020, as outlined below:  PHYSICAL EXAM:  General: WD, WN. Alert, cooperative, no acute distress    EENT:  EOMI. Anicteric sclerae. MMM  Resp:  CTA bilaterally, no wheezing or rales. No accessory muscle use  CV:  Regular  rhythm,  No edema  GI:  Soft, Non distended, Non tender. +BS  Neurologic:  Alert and oriented X 3, normal speech  Psych:   Not anxious nor agitated  Skin:  Dry scabs on b/l LE. Black discoloration of L 2nd toe. Ulceration on R dorsal forefoot. Reviewed most current lab test results and cultures  YES  Reviewed most current radiology test results   YES  Review and summation of old records today    NO  Reviewed patient's current orders and MAR    YES  PMH/SH reviewed - no change compared to H&P  ________________________________________________________________________  Care Plan discussed with:    Comments   Patient x    Family      RN x    Care Manager     Consultant                        Multidiciplinary team rounds were held today with , nursing, pharmacist and clinical coordinator. Patient's plan of care was discussed; medications were reviewed and discharge planning was addressed. ________________________________________________________________________  Total NON critical care TIME: 35 Minutes    Total CRITICAL CARE TIME Spent:   Minutes non procedure based      Comments   >50% of visit spent in counseling and coordination of care     ________________________________________________________________________  Kyara Cornelius MD     Procedures: see electronic medical records for all procedures/Xrays and details which were not copied into this note but were reviewed prior to creation of Plan. LABS:  I reviewed today's most current labs and imaging studies.   Pertinent labs include:  Recent Labs     12/05/20 0227 12/04/20 0512 12/03/20 0458   WBC 10.1 8.3 9.3   HGB 9.5* 10.1* 9.5*   HCT 29.4* 31.9* 29.9*    149* 140*     Recent Labs     12/05/20 0227 12/04/20 0512 12/03/20  0458    140 140   K 3.8 3.7 3.6    108 108   CO2 24 24 27   * 131* 143*   BUN 43* 34* 27*   CREA 1.80* 2.02* 1.90*   CA 8.3* 8.5 8.5   MG  --   --  2.2       Signed: Kyara Cornelius MD

## 2020-12-05 NOTE — PROGRESS NOTES
End of Shift Note    Bedside shift change report given to [address], RN (oncoming nurse) by Betty Shaikh (offgoing nurse). Report included the following information SBAR, Kardex, Intake/Output, MAR and Recent Results    Shift worked:  [id number]     Shift summary and any significant changes:     Pt rested in bed this shift, attempted to get up to chair but will require PT/OT to assist due to pain/weakness in BLE. Consults in. Pt given PRNs for pain, see MAR. Pt states she cannot tolerate solid foods right now and is only requesting soups for meals because they are \"light foods\". Tolerating abx     Concerns for physician to address:  none     Zone phone for oncoming shift:   [phone]       Activity:  Activity Level: Up with Assistance  Number times ambulated in hallways past shift: 0  Number of times OOB to chair past shift: 0    Cardiac:   Cardiac Monitoring: Yes      Cardiac Rhythm: Normal sinus rhythm    Access:   Current line(s): PIV     Genitourinary:   Urinary status: voiding and incontinent    Respiratory:   O2 Device: Room air  Chronic home O2 use?: NO  Incentive spirometer at bedside: NO     GI:  Last Bowel Movement Date: 12/04/20  Current diet:  DIET CARDIAC Regular  Passing flatus: YES  Tolerating current diet: YES  % Diet Eaten: 40 %    Pain Management:   Patient states pain is manageable on current regimen: NO    Skin:  Darien Score: 16  Interventions: turn team, speciality bed, float heels, increase time out of bed and foam dressing    Patient Safety:  Fall Score:  Total Score: 4  Interventions: bed/chair alarm, assistive device (walker, cane, etc), gripper socks and pt to call before getting OOB  High Fall Risk: Yes    Length of Stay:  Expected LOS: 4d 2h  Actual LOS: [address],  [address]

## 2020-12-05 NOTE — PROGRESS NOTES
Problem: Falls - Risk of  Goal: *Absence of Falls  Description: Document Sepideh Zamudio Fall Risk and appropriate interventions in the flowsheet. Outcome: Progressing Towards Goal  Note: Fall Risk Interventions:  Mobility Interventions: Bed/chair exit alarm, Communicate number of staff needed for ambulation/transfer, Mechanical lift, OT consult for ADLs, Patient to call before getting OOB, PT Consult for mobility concerns, PT Consult for assist device competence         Medication Interventions: Bed/chair exit alarm, Evaluate medications/consider consulting pharmacy, Patient to call before getting OOB, Teach patient to arise slowly, Utilize gait belt for transfers/ambulation    Elimination Interventions: Bed/chair exit alarm, Call light in reach, Elevated toilet seat, Patient to call for help with toileting needs, Stay With Me (per policy), Toilet paper/wipes in reach, Toileting schedule/hourly rounds    History of Falls Interventions: Bed/chair exit alarm, Consult care management for discharge planning, Door open when patient unattended, Evaluate medications/consider consulting pharmacy, Investigate reason for fall, Room close to nurse's station, Utilize gait belt for transfer/ambulation, Assess for delayed presentation/identification of injury for 48 hrs (comment for end date)         Problem: Patient Education: Go to Patient Education Activity  Goal: Patient/Family Education  Outcome: Progressing Towards Goal     Problem: Pressure Injury - Risk of  Goal: *Prevention of pressure injury  Description: Document Darien Scale and appropriate interventions in the flowsheet.   Outcome: Progressing Towards Goal  Note: Pressure Injury Interventions:  Sensory Interventions: Assess changes in LOC, Assess need for specialty bed, Avoid rigorous massage over bony prominences, Chair cushion, Check visual cues for pain, Discuss PT/OT consult with provider, Float heels, Keep linens dry and wrinkle-free    Moisture Interventions: Absorbent underpads, Apply protective barrier, creams and emollients, Assess need for specialty bed, Check for incontinence Q2 hours and as needed, Internal/External urinary devices, Limit adult briefs    Activity Interventions: Assess need for specialty bed, Chair cushion, Increase time out of bed, Pressure redistribution bed/mattress(bed type), PT/OT evaluation, Trapeze to reposition    Mobility Interventions: Assess need for specialty bed, Chair cushion, Float heels, HOB 30 degrees or less, Pressure redistribution bed/mattress (bed type), PT/OT evaluation, Suspension boots, Trapeze to reposition    Nutrition Interventions: Document food/fluid/supplement intake, Offer support with meals,snacks and hydration    Friction and Shear Interventions: Apply protective barrier, creams and emollients, Feet elevated on foot rest, Foam dressings/transparent film/skin sealants, HOB 30 degrees or less, Lift sheet, Lift team/patient mobility team, Minimize layers, Sit at 90-degree angle                Problem: Patient Education: Go to Patient Education Activity  Goal: Patient/Family Education  Outcome: Progressing Towards Goal

## 2020-12-06 LAB
ANION GAP SERPL CALC-SCNC: 7 MMOL/L (ref 5–15)
BASOPHILS # BLD: 0.1 K/UL (ref 0–0.1)
BASOPHILS NFR BLD: 1 % (ref 0–1)
BUN SERPL-MCNC: 39 MG/DL (ref 6–20)
BUN/CREAT SERPL: 28 (ref 12–20)
CALCIUM SERPL-MCNC: 8.4 MG/DL (ref 8.5–10.1)
CHLORIDE SERPL-SCNC: 108 MMOL/L (ref 97–108)
CO2 SERPL-SCNC: 22 MMOL/L (ref 21–32)
CREAT SERPL-MCNC: 1.39 MG/DL (ref 0.55–1.02)
DIFFERENTIAL METHOD BLD: ABNORMAL
EOSINOPHIL # BLD: 0.2 K/UL (ref 0–0.4)
EOSINOPHIL NFR BLD: 2 % (ref 0–7)
ERYTHROCYTE [DISTWIDTH] IN BLOOD BY AUTOMATED COUNT: 15.4 % (ref 11.5–14.5)
GLUCOSE SERPL-MCNC: 102 MG/DL (ref 65–100)
HCT VFR BLD AUTO: 35.5 % (ref 35–47)
HGB BLD-MCNC: 10.8 G/DL (ref 11.5–16)
IMM GRANULOCYTES # BLD AUTO: 0.1 K/UL (ref 0–0.04)
IMM GRANULOCYTES NFR BLD AUTO: 1 % (ref 0–0.5)
LYMPHOCYTES # BLD: 1.6 K/UL (ref 0.8–3.5)
LYMPHOCYTES NFR BLD: 15 % (ref 12–49)
MCH RBC QN AUTO: 28.6 PG (ref 26–34)
MCHC RBC AUTO-ENTMCNC: 30.4 G/DL (ref 30–36.5)
MCV RBC AUTO: 93.9 FL (ref 80–99)
MONOCYTES # BLD: 1.1 K/UL (ref 0–1)
MONOCYTES NFR BLD: 10 % (ref 5–13)
NEUTS SEG # BLD: 7.9 K/UL (ref 1.8–8)
NEUTS SEG NFR BLD: 72 % (ref 32–75)
NRBC # BLD: 0 K/UL (ref 0–0.01)
NRBC BLD-RTO: 0 PER 100 WBC
PLATELET # BLD AUTO: 185 K/UL (ref 150–400)
PMV BLD AUTO: 13 FL (ref 8.9–12.9)
POTASSIUM SERPL-SCNC: 3.7 MMOL/L (ref 3.5–5.1)
RBC # BLD AUTO: 3.78 M/UL (ref 3.8–5.2)
SODIUM SERPL-SCNC: 137 MMOL/L (ref 136–145)
WBC # BLD AUTO: 10.9 K/UL (ref 3.6–11)

## 2020-12-06 PROCEDURE — 99232 SBSQ HOSP IP/OBS MODERATE 35: CPT | Performed by: INTERNAL MEDICINE

## 2020-12-06 PROCEDURE — 74011250636 HC RX REV CODE- 250/636: Performed by: INTERNAL MEDICINE

## 2020-12-06 PROCEDURE — 74011000258 HC RX REV CODE- 258: Performed by: INTERNAL MEDICINE

## 2020-12-06 PROCEDURE — 74011250637 HC RX REV CODE- 250/637: Performed by: INTERNAL MEDICINE

## 2020-12-06 PROCEDURE — 94760 N-INVAS EAR/PLS OXIMETRY 1: CPT

## 2020-12-06 PROCEDURE — 85025 COMPLETE CBC W/AUTO DIFF WBC: CPT

## 2020-12-06 PROCEDURE — 74011250637 HC RX REV CODE- 250/637: Performed by: EMERGENCY MEDICINE

## 2020-12-06 PROCEDURE — 74011250637 HC RX REV CODE- 250/637: Performed by: NURSE PRACTITIONER

## 2020-12-06 PROCEDURE — 65660000000 HC RM CCU STEPDOWN

## 2020-12-06 PROCEDURE — 36415 COLL VENOUS BLD VENIPUNCTURE: CPT

## 2020-12-06 PROCEDURE — 80048 BASIC METABOLIC PNL TOTAL CA: CPT

## 2020-12-06 RX ORDER — HYDRALAZINE HYDROCHLORIDE 25 MG/1
25 TABLET, FILM COATED ORAL ONCE
Status: COMPLETED | OUTPATIENT
Start: 2020-12-06 | End: 2020-12-06

## 2020-12-06 RX ORDER — HYDRALAZINE HYDROCHLORIDE 50 MG/1
50 TABLET, FILM COATED ORAL 3 TIMES DAILY
Status: DISCONTINUED | OUTPATIENT
Start: 2020-12-06 | End: 2020-12-07

## 2020-12-06 RX ADMIN — POTASSIUM CHLORIDE 20 MEQ: 20 TABLET, EXTENDED RELEASE ORAL at 08:01

## 2020-12-06 RX ADMIN — ACETAMINOPHEN 650 MG: 325 TABLET ORAL at 21:47

## 2020-12-06 RX ADMIN — HYDRALAZINE HYDROCHLORIDE 25 MG: 25 TABLET, FILM COATED ORAL at 08:01

## 2020-12-06 RX ADMIN — NITROGLYCERIN 1 INCH: 20 OINTMENT TOPICAL at 15:38

## 2020-12-06 RX ADMIN — HYDRALAZINE HYDROCHLORIDE 10 MG: 20 INJECTION INTRAMUSCULAR; INTRAVENOUS at 05:25

## 2020-12-06 RX ADMIN — Medication 10 ML: at 05:28

## 2020-12-06 RX ADMIN — CLONIDINE HYDROCHLORIDE 0.2 MG: 0.1 TABLET ORAL at 21:36

## 2020-12-06 RX ADMIN — AMLODIPINE BESYLATE 10 MG: 5 TABLET ORAL at 08:01

## 2020-12-06 RX ADMIN — HEPARIN SODIUM 5000 UNITS: 5000 INJECTION INTRAVENOUS; SUBCUTANEOUS at 21:36

## 2020-12-06 RX ADMIN — TRAMADOL HYDROCHLORIDE 50 MG: 50 TABLET, COATED ORAL at 19:44

## 2020-12-06 RX ADMIN — HYDRALAZINE HYDROCHLORIDE 25 MG: 25 TABLET, FILM COATED ORAL at 10:12

## 2020-12-06 RX ADMIN — HYDRALAZINE HYDROCHLORIDE 50 MG: 50 TABLET, FILM COATED ORAL at 15:38

## 2020-12-06 RX ADMIN — METRONIDAZOLE 500 MG: 500 INJECTION, SOLUTION INTRAVENOUS at 08:03

## 2020-12-06 RX ADMIN — NITROGLYCERIN 1 INCH: 20 OINTMENT TOPICAL at 21:36

## 2020-12-06 RX ADMIN — HEPARIN SODIUM 5000 UNITS: 5000 INJECTION INTRAVENOUS; SUBCUTANEOUS at 08:01

## 2020-12-06 RX ADMIN — CLONIDINE HYDROCHLORIDE 0.2 MG: 0.1 TABLET ORAL at 15:38

## 2020-12-06 RX ADMIN — HYDRALAZINE HYDROCHLORIDE 50 MG: 50 TABLET, FILM COATED ORAL at 21:36

## 2020-12-06 RX ADMIN — CEFEPIME HYDROCHLORIDE 2 G: 2 INJECTION, POWDER, FOR SOLUTION INTRAVENOUS at 05:24

## 2020-12-06 RX ADMIN — ASPIRIN 81 MG: 81 TABLET, COATED ORAL at 08:01

## 2020-12-06 RX ADMIN — MUPIROCIN: 20 OINTMENT TOPICAL at 08:03

## 2020-12-06 RX ADMIN — ACETAMINOPHEN 650 MG: 325 TABLET ORAL at 03:07

## 2020-12-06 RX ADMIN — CLONIDINE HYDROCHLORIDE 0.2 MG: 0.1 TABLET ORAL at 08:01

## 2020-12-06 RX ADMIN — Medication 10 ML: at 21:47

## 2020-12-06 RX ADMIN — PANTOPRAZOLE SODIUM 40 MG: 40 TABLET, DELAYED RELEASE ORAL at 06:39

## 2020-12-06 RX ADMIN — TRAMADOL HYDROCHLORIDE 50 MG: 50 TABLET, COATED ORAL at 08:01

## 2020-12-06 RX ADMIN — Medication 10 ML: at 14:53

## 2020-12-06 RX ADMIN — ACETAMINOPHEN 650 MG: 325 TABLET ORAL at 15:45

## 2020-12-06 RX ADMIN — NITROGLYCERIN 1 INCH: 20 OINTMENT TOPICAL at 08:01

## 2020-12-06 RX ADMIN — TRAMADOL HYDROCHLORIDE 50 MG: 50 TABLET, COATED ORAL at 14:10

## 2020-12-06 NOTE — PROGRESS NOTES
End of Shift Note    Bedside shift change [facility] ([facility] by Tyrell Bean RN (offgoing nurse). Report included the following information SBAR, Kardex, ED Summary, Intake/Output, MAR, Recent Results and Cardiac Rhythm (NSR)    Shift worked:  7a-7p     Shift summary and any significant changes:     Patient rested in bed most of day. Treated with pain meds three times; see MAR. Wound care performed to BLE. Tolerating diet well. Concerns for physician to address:  n/a     Zone phone for oncoming shift:   [phone]       Activity:  Activity Level: Up with Assistance  Number times ambulated in hallways past shift: 0  Number of times OOB to chair past shift: 0    Cardiac:   Cardiac Monitoring: Yes      Cardiac Rhythm: Normal sinus rhythm    Access:   Current line(s): PIV     Genitourinary:   Urinary status: voiding and incontinent    Respiratory:   O2 Device: Room air  Chronic home O2 use?: NO  Incentive spirometer at bedside: NO     GI:  Last Bowel Movement Date: 12/06/20  Current diet:  DIET CARDIAC Regular  Passing flatus: YES  Tolerating current diet: YES  % Diet Eaten: 100 %    Pain Management:   Patient states pain is manageable on current regimen: YES    Skin:  Darien Score: 15  Interventions: turn team, float heels, increase time out of bed and limit briefs    Patient Safety:  Fall Score:  Total Score: 4  Interventions: bed/chair alarm, assistive device (walker, cane, etc), gripper socks, pt to call before getting OOB, stay with me (per policy) and gait belt  High Fall Risk: Yes    Length of Stay:  Expected LOS: 4d 2h  Actual LOS: Guero Ballard RN

## 2020-12-06 NOTE — PROGRESS NOTES
Cardiology Progress Note      12/6/2020 12:29 PM    Admit Date: 12/1/2020    Admit Diagnosis: Cellulitis [L03.90]      Subjective:     Adrianna Rodriguez is feeling better today. Bp remains elevated.     Visit Vitals  BP (!) 159/75 (BP 1 Location: Right arm, BP Patient Position: At rest)   Pulse 83   Temp 98.4 °F (36.9 °C)   Resp 16   Ht 5' 4\" (1.626 m)   Wt 115 lb (52.2 kg)   SpO2 98%   Breastfeeding No   BMI 19.74 kg/m²       Current Facility-Administered Medications   Medication Dose Route Frequency    hydrALAZINE (APRESOLINE) tablet 50 mg  50 mg Oral TID    cefepime (MAXIPIME) 2 g in 0.9% sodium chloride (MBP/ADV) 100 mL MBP  2 g IntraVENous Q24H    traMADoL (ULTRAM) tablet 50 mg  50 mg Oral Q6H PRN    nitroglycerin (NITROBID) 2 % ointment 1 Inch  1 Inch Topical TID    amLODIPine (NORVASC) tablet 10 mg  10 mg Oral DAILY    pantoprazole (PROTONIX) tablet 40 mg  40 mg Oral ACB    cloNIDine HCL (CATAPRES) tablet 0.2 mg  0.2 mg Oral TID    hydrALAZINE (APRESOLINE) 20 mg/mL injection 10 mg  10 mg IntraVENous Q6H PRN    aspirin delayed-release tablet 81 mg  81 mg Oral DAILY    ALPRAZolam (XANAX) tablet 0.25 mg  0.25 mg Oral BID PRN    mupirocin (BACTROBAN) 2 % ointment   Topical DAILY    acetaminophen (TYLENOL) tablet 650 mg  650 mg Oral Q6H PRN    sodium chloride (NS) flush 5-40 mL  5-40 mL IntraVENous Q8H    sodium chloride (NS) flush 5-40 mL  5-40 mL IntraVENous PRN    polyethylene glycol (MIRALAX) packet 17 g  17 g Oral DAILY PRN    ondansetron (ZOFRAN) injection 4 mg  4 mg IntraVENous Q6H PRN    heparin (porcine) injection 5,000 Units  5,000 Units SubCUTAneous Q12H    potassium chloride (K-DUR, KLOR-CON) SR tablet 20 mEq  20 mEq Oral DAILY         Objective:      Physical Exam:  Visit Vitals  BP (!) 159/75 (BP 1 Location: Right arm, BP Patient Position: At rest)   Pulse 83   Temp 98.4 °F (36.9 °C)   Resp 16   Ht 5' 4\" (1.626 m)   Wt 115 lb (52.2 kg)   SpO2 98%   Breastfeeding No   BMI 19.74 kg/m² General Appearance:  Well developed, well nourished,alert and oriented x 3, and individual in no acute distress. Ears/Nose/Mouth/Throat:   Hearing grossly normal.         Neck: Supple. Chest:   Lungs clear to auscultation bilaterally. Cardiovascular:  Regular rate and rhythm, S1, S2 normal, no murmur. Abdomen:   Soft, non-tender, bowel sounds are active. Extremities: No edema bilaterally. Skin: Warm and dry. Data Review:   Labs:    Recent Results (from the past 24 hour(s))   CBC WITH AUTOMATED DIFF    Collection Time: 12/06/20  3:23 AM   Result Value Ref Range    WBC 10.9 3.6 - 11.0 K/uL    RBC 3.78 (L) 3.80 - 5.20 M/uL    HGB 10.8 (L) 11.5 - 16.0 g/dL    HCT 35.5 35.0 - 47.0 %    MCV 93.9 80.0 - 99.0 FL    MCH 28.6 26.0 - 34.0 PG    MCHC 30.4 30.0 - 36.5 g/dL    RDW 15.4 (H) 11.5 - 14.5 %    PLATELET 341 939 - 914 K/uL    MPV 13.0 (H) 8.9 - 12.9 FL    NRBC 0.0 0  WBC    ABSOLUTE NRBC 0.00 0.00 - 0.01 K/uL    NEUTROPHILS 72 32 - 75 %    LYMPHOCYTES 15 12 - 49 %    MONOCYTES 10 5 - 13 %    EOSINOPHILS 2 0 - 7 %    BASOPHILS 1 0 - 1 %    IMMATURE GRANULOCYTES 1 (H) 0.0 - 0.5 %    ABS. NEUTROPHILS 7.9 1.8 - 8.0 K/UL    ABS. LYMPHOCYTES 1.6 0.8 - 3.5 K/UL    ABS. MONOCYTES 1.1 (H) 0.0 - 1.0 K/UL    ABS. EOSINOPHILS 0.2 0.0 - 0.4 K/UL    ABS. BASOPHILS 0.1 0.0 - 0.1 K/UL    ABS. IMM.  GRANS. 0.1 (H) 0.00 - 0.04 K/UL    DF AUTOMATED     METABOLIC PANEL, BASIC    Collection Time: 12/06/20  3:23 AM   Result Value Ref Range    Sodium 137 136 - 145 mmol/L    Potassium 3.7 3.5 - 5.1 mmol/L    Chloride 108 97 - 108 mmol/L    CO2 22 21 - 32 mmol/L    Anion gap 7 5 - 15 mmol/L    Glucose 102 (H) 65 - 100 mg/dL    BUN 39 (H) 6 - 20 MG/DL    Creatinine 1.39 (H) 0.55 - 1.02 MG/DL    BUN/Creatinine ratio 28 (H) 12 - 20      GFR est AA 45 (L) >60 ml/min/1.73m2    GFR est non-AA 37 (L) >60 ml/min/1.73m2    Calcium 8.4 (L) 8.5 - 10.1 MG/DL       Telemetry: normal sinus rhythm      Assessment: Hospital Problems  Date Reviewed: 12/3/2020          Codes Class Noted POA    Cardiomyopathy ([facility] ICD-10-CM: I42.9  ICD-9-CM: 425.4  12/3/2020 Unknown        Systolic heart failure ([facility] ICD-10-CM: I50.20  ICD-9-CM: 428.20  12/3/2020 Unknown        HFrEF (heart failure with reduced ejection fraction) ([facility] ICD-10-CM: I50.20  ICD-9-CM: 428.20  12/3/2020 Unknown        Hypertension, uncontrolled ICD-10-CM: I10  ICD-9-CM: 401.9  12/3/2020 Unknown        Mobitz type 2 second degree atrioventricular block ICD-10-CM: I44.1  ICD-9-CM: 426.12  12/3/2020 Unknown        * (Principal) Cellulitis ICD-10-CM: L03.90  ICD-9-CM: 682.9  12/1/2020 Unknown              Plan:     Continue medical therapy. Adjust anti hypertensive therapy. Ischemia w/u per Dr. Conner Ohara.     Marce Cade MD

## 2020-12-06 NOTE — PROGRESS NOTES
8921: Pts /76. MD notified at this time. 7031: No new orders received at this time    0803: Morning medications administered. Will recheck BP.    0837: Pt BP now 177/75. MD notified. 1012: One time dose of hydralazine 25 mg ordered and given. 1043: BP lowered to 159/75. Will continue to monitor. 1455: Bp 165/67. Administering PRN hydralazine. Will recheck BP.

## 2020-12-06 NOTE — PROGRESS NOTES
Problem: Falls - Risk of  Goal: *Absence of Falls  Description: Document Abe Going Fall Risk and appropriate interventions in the flowsheet. Outcome: Progressing Towards Goal  Note: Fall Risk Interventions:  Mobility Interventions: Communicate number of staff needed for ambulation/transfer, Patient to call before getting OOB, PT Consult for mobility concerns         Medication Interventions: Patient to call before getting OOB, Teach patient to arise slowly    Elimination Interventions: Call light in reach, Patient to call for help with toileting needs    History of Falls Interventions: Investigate reason for fall         Problem: Pressure Injury - Risk of  Goal: *Prevention of pressure injury  Description: Document Darien Scale and appropriate interventions in the flowsheet.   Outcome: Progressing Towards Goal  Note: Pressure Injury Interventions:  Sensory Interventions: Assess changes in LOC, Float heels, Keep linens dry and wrinkle-free, Maintain/enhance activity level    Moisture Interventions: Absorbent underpads, Internal/External urinary devices    Activity Interventions: Increase time out of bed, PT/OT evaluation    Mobility Interventions: Float heels, HOB 30 degrees or less, PT/OT evaluation    Nutrition Interventions: Document food/fluid/supplement intake    Friction and Shear Interventions: Feet elevated on foot rest, Foam dressings/transparent film/skin sealants                Problem: Pain  Goal: *Control of Pain  Outcome: Progressing Towards Goal     Problem: Hypertension  Goal: *Blood pressure within specified parameters  Outcome: Not Progressing Towards Goal

## 2020-12-06 NOTE — PROGRESS NOTES
.End of Shift Note    Bedside shift change report given to Bridger Bailon RN (oncoming nurse) by Benito Valverde (offgoing nurse). Report included the following information SBAR, Kardex, MAR and Recent Results    Shift worked:  7p-7a     Shift summary and any significant changes:     Patient's BP was 199/93. PRN hydralazine administered and BP was 181/79 upon reassessment. Charge nurse and oncoming nurse aware. Will continue to monitor. All other VS stable. Patient c/o foot pain x2 and received tramadol and tylenol, stated relief. Concerns for physician to address:  none     Zone phone for oncoming shift:   [phone]       Activity:  Activity Level: Up with Assistance      Cardiac:   Cardiac Monitoring: yes  Cardiac Rhythm: Normal sinus rhythm    Access:   Current line(s):22 L forearm     Genitourinary:   Urinary status: voiding, external catheter     Respiratory:   O2 Device: Room air  GI:  Last Bowel Movement Date: 12/04/20  Current diet:  DIET CARDIAC Regular  Passing flatus:yes  Tolerating current diet: unknown   % Diet Eaten: 40 %    Pain Management:   Patient states pain is manageable on current regimen: yes    Skin:  Darien Score: 15  Interventions: absorbant underpads, external catheter, keep linen dry and wrinkle free, assess changes in LOC    Patient Safety:  Fall Score:  Total Score: 4  Interventions: patient to call before getting OOB, teach patient to rise slowly, investigate reason for fall   High Fall Risk: Yes    Length of Stay:  Expected LOS: 4d 2h  Actual LOS: Rosie Cruz 38.

## 2020-12-06 NOTE — PROGRESS NOTES
Hospitalist Progress Note    NAME: Moo Amaro   :  1945   MRN:  [id number]       Assessment / Plan:  LE cellulitis  MRI L foot neg for any fracture. No OM. Nonspecific diffuse subcutaneous edema. 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 R resting CARISSA is nl. R TBI 1.01.  L resting CARISSA is normal, L TIB 1.01  A1C 5.9  Procalcitonin <0.05, wound cx showed staph aureus and heavy diptheroids  Will cont' empiric IV abx: cefepime. Stop vanc and flagyl  Tramadol prn  Podiatry and wound care following, appreciate recs   Up in chair, out of bed    PT/OT  New onset of cardiomyopathy, EF 35-40%  HTN  2nd deg AV block  Chest pain, resolved  Coreg discontinued due to 2nd deg heart block seen on tele  Trop neg, EKG without acute changes  Cont' Norvasc, nitrobid. Increase hydralazine, titrate prn  Cont' clonidine  Not on ACEi/ARBs due to TONJA  Cardiology following    TONJA, likely from diuretics and recent contrast use. Avoid nephrotoxic agents. Cr improving     Hypokalemia, repleted    PNA on CXR  CXR showed R pleural effusion with basilar air space disease  probnp elevated   Pt already on IV abx as above  Lasix on hold   Monitor daily lytes, strict I&O    TONJA vs CKD3  Cr trending down, monitor      Code status: Full  Prophylaxis: Hep SQ  Recommended Disposition: TBD     Subjective:     Chief Complaint / Reason for Physician Visit  Pt seen, NAD. No chest pain today but has leg pain. Discussed with RN events overnight. Review of Systems:  Symptom Y/N Comments  Symptom Y/N Comments   Fever/Chills    Chest Pain     Poor Appetite    Edema     Cough    Abdominal Pain     Sputum    Joint Pain     SOB/LACY    Pruritis/Rash     Nausea/vomit    Tolerating PT/OT     Diarrhea    Tolerating Diet     Constipation    Other       Could NOT obtain due to:      Objective:     VITALS:   Last 24hrs VS reviewed since prior progress note.  Most recent are:  Patient Vitals for the past 24 hrs:   Temp Pulse Resp BP SpO2   12/06/20 0834    (!) 177/75    12/06/20 0723 98.4 °F (36.9 °C) 91 16 (!) 189/76 98 %   12/06/20 0638  82  (!) 181/79    12/06/20 0613    (!) 188/79    12/06/20 0603    (!) 195/83    12/06/20 0525  85  (!) 199/93    12/06/20 0351 97.4 °F (36.3 °C) 80  (!) 188/86 100 %   12/05/20 2218 97.4 °F (36.3 °C) 81 16 (!) 186/81 96 %   12/05/20 1520 96.9 °F (36.1 °C) 86 18 (!) 181/72 99 %   12/05/20 1214 98.6 °F (37 °C) 81 17 (!) 158/66 100 %       Intake/Output Summary (Last 24 hours) at 12/6/2020 0957  Last data filed at 12/5/2020 2215  Gross per 24 hour   Intake 450 ml   Output 150 ml   Net 300 ml        I had a face to face encounter and independently examined this patient on 12/6/2020, as outlined below:  PHYSICAL EXAM:  General: WD, WN. Alert, cooperative, no acute distress    EENT:  EOMI. Anicteric sclerae. MMM  Resp:  CTA bilaterally, no wheezing or rales. No accessory muscle use  CV:  Regular  rhythm,  No edema  GI:  Soft, Non distended, Non tender. +BS  Neurologic:  Alert and oriented X 3, normal speech  Psych:   Not anxious nor agitated  Skin:  Dry scabs on b/l LE. Black discoloration of L 2nd toe. Ulceration on R dorsal forefoot. Reviewed most current lab test results and cultures  YES  Reviewed most current radiology test results   YES  Review and summation of old records today    NO  Reviewed patient's current orders and MAR    YES  PMH/SH reviewed - no change compared to H&P  ________________________________________________________________________  Care Plan discussed with:    Comments   Patient x    Family      RN x    Care Manager     Consultant                        Multidiciplinary team rounds were held today with , nursing, pharmacist and clinical coordinator. Patient's plan of care was discussed; medications were reviewed and discharge planning was addressed. ________________________________________________________________________  Total NON critical care TIME: 35 Minutes    Total CRITICAL CARE TIME Spent:   Minutes non procedure based      Comments   >50% of visit spent in counseling and coordination of care     ________________________________________________________________________  Donny Bright MD     Procedures: see electronic medical records for all procedures/Xrays and details which were not copied into this note but were reviewed prior to creation of Plan. LABS:  I reviewed today's most current labs and imaging studies.   Pertinent labs include:  Recent Labs     12/06/20 0323 12/05/20 0227 12/04/20 0512   WBC 10.9 10.1 8.3   HGB 10.8* 9.5* 10.1*   HCT 35.5 29.4* 31.9*    159 149*     Recent Labs     12/06/20 0323 12/05/20 0227 12/04/20 0512    139 140   K 3.7 3.8 3.7    107 108   CO2 22 24 24   * 120* 131*   BUN 39* 43* 34*   CREA 1.39* 1.80* 2.02*   CA 8.4* 8.3* 8.5       Signed: Donny Bright MD

## 2020-12-07 LAB
ANION GAP SERPL CALC-SCNC: 4 MMOL/L (ref 5–15)
BASOPHILS # BLD: 0 K/UL (ref 0–0.1)
BASOPHILS NFR BLD: 0 % (ref 0–1)
BUN SERPL-MCNC: 36 MG/DL (ref 6–20)
BUN/CREAT SERPL: 29 (ref 12–20)
CALCIUM SERPL-MCNC: 8.4 MG/DL (ref 8.5–10.1)
CHLORIDE SERPL-SCNC: 108 MMOL/L (ref 97–108)
CO2 SERPL-SCNC: 26 MMOL/L (ref 21–32)
CREAT SERPL-MCNC: 1.24 MG/DL (ref 0.55–1.02)
DIFFERENTIAL METHOD BLD: ABNORMAL
EOSINOPHIL # BLD: 0.3 K/UL (ref 0–0.4)
EOSINOPHIL NFR BLD: 3 % (ref 0–7)
ERYTHROCYTE [DISTWIDTH] IN BLOOD BY AUTOMATED COUNT: 14.9 % (ref 11.5–14.5)
GLUCOSE SERPL-MCNC: 122 MG/DL (ref 65–100)
HCT VFR BLD AUTO: 32 % (ref 35–47)
HGB BLD-MCNC: 10.2 G/DL (ref 11.5–16)
IMM GRANULOCYTES # BLD AUTO: 0.1 K/UL (ref 0–0.04)
IMM GRANULOCYTES NFR BLD AUTO: 1 % (ref 0–0.5)
LYMPHOCYTES # BLD: 1.7 K/UL (ref 0.8–3.5)
LYMPHOCYTES NFR BLD: 16 % (ref 12–49)
MCH RBC QN AUTO: 28.7 PG (ref 26–34)
MCHC RBC AUTO-ENTMCNC: 31.9 G/DL (ref 30–36.5)
MCV RBC AUTO: 89.9 FL (ref 80–99)
MONOCYTES # BLD: 1.3 K/UL (ref 0–1)
MONOCYTES NFR BLD: 12 % (ref 5–13)
NEUTS SEG # BLD: 7 K/UL (ref 1.8–8)
NEUTS SEG NFR BLD: 68 % (ref 32–75)
NRBC # BLD: 0.02 K/UL (ref 0–0.01)
NRBC BLD-RTO: 0.2 PER 100 WBC
PLATELET # BLD AUTO: 190 K/UL (ref 150–400)
POTASSIUM SERPL-SCNC: 3.4 MMOL/L (ref 3.5–5.1)
RBC # BLD AUTO: 3.56 M/UL (ref 3.8–5.2)
SODIUM SERPL-SCNC: 138 MMOL/L (ref 136–145)
WBC # BLD AUTO: 10.4 K/UL (ref 3.6–11)

## 2020-12-07 PROCEDURE — 97530 THERAPEUTIC ACTIVITIES: CPT | Performed by: PHYSICAL THERAPIST

## 2020-12-07 PROCEDURE — 74011250637 HC RX REV CODE- 250/637: Performed by: INTERNAL MEDICINE

## 2020-12-07 PROCEDURE — 85025 COMPLETE CBC W/AUTO DIFF WBC: CPT

## 2020-12-07 PROCEDURE — 97166 OT EVAL MOD COMPLEX 45 MIN: CPT | Performed by: OCCUPATIONAL THERAPIST

## 2020-12-07 PROCEDURE — 99232 SBSQ HOSP IP/OBS MODERATE 35: CPT | Performed by: INTERNAL MEDICINE

## 2020-12-07 PROCEDURE — 36415 COLL VENOUS BLD VENIPUNCTURE: CPT

## 2020-12-07 PROCEDURE — 74011250636 HC RX REV CODE- 250/636: Performed by: INTERNAL MEDICINE

## 2020-12-07 PROCEDURE — 80048 BASIC METABOLIC PNL TOTAL CA: CPT

## 2020-12-07 PROCEDURE — 97161 PT EVAL LOW COMPLEX 20 MIN: CPT | Performed by: PHYSICAL THERAPIST

## 2020-12-07 PROCEDURE — 74011000258 HC RX REV CODE- 258: Performed by: INTERNAL MEDICINE

## 2020-12-07 PROCEDURE — 74011250637 HC RX REV CODE- 250/637: Performed by: NURSE PRACTITIONER

## 2020-12-07 PROCEDURE — 97530 THERAPEUTIC ACTIVITIES: CPT | Performed by: OCCUPATIONAL THERAPIST

## 2020-12-07 PROCEDURE — 65660000000 HC RM CCU STEPDOWN

## 2020-12-07 PROCEDURE — 94760 N-INVAS EAR/PLS OXIMETRY 1: CPT

## 2020-12-07 RX ORDER — POLYETHYLENE GLYCOL 3350 17 G/17G
17 POWDER, FOR SOLUTION ORAL DAILY
Status: DISCONTINUED | OUTPATIENT
Start: 2020-12-07 | End: 2020-12-09

## 2020-12-07 RX ORDER — FACIAL-BODY WIPES
10 EACH TOPICAL DAILY PRN
Status: DISCONTINUED | OUTPATIENT
Start: 2020-12-07 | End: 2020-12-10 | Stop reason: HOSPADM

## 2020-12-07 RX ORDER — MORPHINE SULFATE 2 MG/ML
1 INJECTION, SOLUTION INTRAMUSCULAR; INTRAVENOUS
Status: DISCONTINUED | OUTPATIENT
Start: 2020-12-07 | End: 2020-12-10 | Stop reason: HOSPADM

## 2020-12-07 RX ORDER — POTASSIUM CHLORIDE 20 MEQ/1
40 TABLET, EXTENDED RELEASE ORAL
Status: COMPLETED | OUTPATIENT
Start: 2020-12-07 | End: 2020-12-07

## 2020-12-07 RX ORDER — HYDRALAZINE HYDROCHLORIDE 50 MG/1
100 TABLET, FILM COATED ORAL 3 TIMES DAILY
Status: DISCONTINUED | OUTPATIENT
Start: 2020-12-07 | End: 2020-12-10 | Stop reason: HOSPADM

## 2020-12-07 RX ADMIN — HYDRALAZINE HYDROCHLORIDE 10 MG: 20 INJECTION INTRAMUSCULAR; INTRAVENOUS at 07:39

## 2020-12-07 RX ADMIN — HEPARIN SODIUM 5000 UNITS: 5000 INJECTION INTRAVENOUS; SUBCUTANEOUS at 09:48

## 2020-12-07 RX ADMIN — NITROGLYCERIN 1 INCH: 20 OINTMENT TOPICAL at 09:46

## 2020-12-07 RX ADMIN — POTASSIUM CHLORIDE 20 MEQ: 20 TABLET, EXTENDED RELEASE ORAL at 09:46

## 2020-12-07 RX ADMIN — AMLODIPINE BESYLATE 10 MG: 5 TABLET ORAL at 09:47

## 2020-12-07 RX ADMIN — HYDRALAZINE HYDROCHLORIDE 100 MG: 50 TABLET, FILM COATED ORAL at 16:35

## 2020-12-07 RX ADMIN — NITROGLYCERIN 1 INCH: 20 OINTMENT TOPICAL at 21:39

## 2020-12-07 RX ADMIN — HYDRALAZINE HYDROCHLORIDE 100 MG: 50 TABLET, FILM COATED ORAL at 09:46

## 2020-12-07 RX ADMIN — Medication 10 ML: at 14:51

## 2020-12-07 RX ADMIN — CLONIDINE HYDROCHLORIDE 0.2 MG: 0.1 TABLET ORAL at 16:35

## 2020-12-07 RX ADMIN — ASPIRIN 81 MG: 81 TABLET, COATED ORAL at 09:47

## 2020-12-07 RX ADMIN — HYDRALAZINE HYDROCHLORIDE 100 MG: 50 TABLET, FILM COATED ORAL at 21:39

## 2020-12-07 RX ADMIN — MUPIROCIN: 20 OINTMENT TOPICAL at 09:48

## 2020-12-07 RX ADMIN — POLYETHYLENE GLYCOL 3350 17 G: 17 POWDER, FOR SOLUTION ORAL at 10:52

## 2020-12-07 RX ADMIN — POTASSIUM CHLORIDE 40 MEQ: 20 TABLET, EXTENDED RELEASE ORAL at 09:47

## 2020-12-07 RX ADMIN — PANTOPRAZOLE SODIUM 40 MG: 40 TABLET, DELAYED RELEASE ORAL at 06:57

## 2020-12-07 RX ADMIN — NITROGLYCERIN 1 INCH: 20 OINTMENT TOPICAL at 16:35

## 2020-12-07 RX ADMIN — CLONIDINE HYDROCHLORIDE 0.2 MG: 0.1 TABLET ORAL at 09:47

## 2020-12-07 RX ADMIN — HEPARIN SODIUM 5000 UNITS: 5000 INJECTION INTRAVENOUS; SUBCUTANEOUS at 21:39

## 2020-12-07 RX ADMIN — TRAMADOL HYDROCHLORIDE 50 MG: 50 TABLET, COATED ORAL at 07:39

## 2020-12-07 RX ADMIN — Medication 10 ML: at 07:02

## 2020-12-07 RX ADMIN — Medication 10 ML: at 21:40

## 2020-12-07 RX ADMIN — CEFEPIME HYDROCHLORIDE 2 G: 2 INJECTION, POWDER, FOR SOLUTION INTRAVENOUS at 06:57

## 2020-12-07 RX ADMIN — CLONIDINE HYDROCHLORIDE 0.2 MG: 0.1 TABLET ORAL at 21:39

## 2020-12-07 RX ADMIN — HYDRALAZINE HYDROCHLORIDE 10 MG: 20 INJECTION INTRAMUSCULAR; INTRAVENOUS at 02:51

## 2020-12-07 RX ADMIN — TRAMADOL HYDROCHLORIDE 50 MG: 50 TABLET, COATED ORAL at 16:41

## 2020-12-07 NOTE — PROGRESS NOTES
7156: Pts /76. PRN meds given at this time. 1058: Pts /83. Notifying MD    1111: no new orders. 1539: Pts /80. Administering scheduled meds at this time will continue to monitor.

## 2020-12-07 NOTE — PROGRESS NOTES
Plan:  -SNF  -? BLS transport       4:15PM  CM PC to pt's home number ([phone]). Spoke with Linde Landau. Return call from [facility]. CM confirmed with them that pt was mostly independent. Per Shy, pt feel a couple months ago. Pt has 24/7 supervision at home. Family works together to provide support for pt at home. All in agreement with SNF at d/c. [facility] requested [facility] as first choice and [facility] as second choice. Referral sent through cclink to [facility]. CM to contact Linde Landau or Domenic with updates throughout stay. 3:47PM  CM attempt to reach pt's niece, Domenic, by phone ([phone]). No answer and VM not set up. CM left VM for pt's son, Linde Landau ([phone]), listed as an emergency contact. Reason for Admission:   LE cellulitis                  RUR Score:     17%             PCP: First and Last name:  Unknown   Name of Practice:    Are you a current patient: Yes/No:    Approximate date of last visit:    Can you participate in a virtual visit if needed:     Do you (patient/family) have any concerns for transition/discharge? None              Plan for utilizing home health:   TBD    Current Advanced Directive/Advance Care Plan:  None on file, pt has at least 2 sons            Transition of Care Plan:   ? SNF         2:50PM  CM met with pt to complete assessment and discuss d/c plan. Pt able to answer questions appropriately throughout assessment. Pt confirmed demographics. No PCP listed in chart. Pt states her provider's name is on her insurance card. No copy of card in media at this time. Pt states that she has two sons, Linde Landau and Shelli Morales, who live with her. Pt names her niece, Christopher Carmen, as her preferred contact. Per pt, she was independent prior to admission with dressing. She states she used a RW when she went out. Pt denies any past HH or rehab experiences. Pt unable to stand with PT/OT today.  Pt states this is a change from how she was functioning before she came to the hospital. CM discussed SNF for rehab. Pt states she is agreeable and directed CM to discuss with her niece. CM will continue to follow and assist with d/c planning.       Care Management Interventions  Transition of Care Consult (CM Consult): SNF  Partner SNF: Yes  Physical Therapy Consult: Yes  Occupational Therapy Consult: Yes  Current Support Network: Relative's Home  Confirm Follow Up Transport: Family  Discharge Location  Discharge Placement: (Likely SNF)      Nazanin Luz, MSW  Care Manager

## 2020-12-07 NOTE — PROGRESS NOTES
Problem: Self Care Deficits Care Plan (Adult)  Goal: *Acute Goals and Plan of Care (Insert Text)  Description: FUNCTIONAL STATUS PRIOR TO ADMISSION: Patient is a poor historian. She lives with 2 sons, and reports that someone is always at home. Unsure of exact prior level of function, but patient reports that she only gets dressed if she is going somewhere. B LE wounds. HOME SUPPORT: The patient lived with 2 sons in an apartment. Occupational Therapy Goals  Initiated 12/7/2020  1. Patient will perform grooming seated edge of bed with supervision/SBA within 7 day(s). 2.  Patient will perform upper body bathing with minimal assistance/contact guard assist within 7 day(s). 3.  Patient will perform upper body dressing with supervision/SBA seated edge of bed within 7 day(s). 4.  Patient will transfers to a [facility] with maximal assistance within 7 day(s). 5.  Patient will participate in upper extremity therapeutic exercise/activities with Min cues for 10 minutes within 7 day(s). OCCUPATIONAL THERAPY EVALUATION  Patient: Lisandra Norton (21 y.o. female)  Date: 12/7/2020  Primary Diagnosis: Cellulitis [L03.90]        Precautions:   Fall, NWB L LE (until clarification from podiatry is obtained)    ASSESSMENT  Based on the objective data described below, the patient presents with significant deficits in self-care, primarily due to confusion, decreased insight into deficits, impaired decision making, decreased safety, impaired functional mobility with inability to stand, general weakness, poor activity tolerance. Patient is a poor historian. Frequency of treatment may be decreased once prior level of function is apparent. No clarification in the chart regarding L LE weight-bearing, so assuming NWB at this time. Patient will benefit from skilled OT treatment to maximize independence and safety in ADL. Current Level of Function Impacting Discharge (ADLs/self-care):  Total A to Min A    Functional Outcome Measure: The patient scored 10/100 on the Barthel Index outcome measure which is indicative of profound functional deficits. Patient will benefit from skilled therapy intervention to address the above noted impairments. PLAN :  Recommendations and Planned Interventions: self care training, functional mobility training, therapeutic exercise, balance training, therapeutic activities, cognitive retraining, endurance activities, neuromuscular re-education, patient education, home safety training, and family training/education    Frequency/Duration: Patient will be followed by occupational therapy 4 times a week to address goals. Recommendation for discharge: (in order for the patient to meet his/her long term goals)  Therapy up to 5 days/week in SNF setting    This discharge recommendation:  A follow-up discussion with the attending provider and/or case management is planned    IF patient discharges home will need the following DME: TBD       SUBJECTIVE:   Patient stated Barnett helps me but I only get dressed if I'm going out.     OBJECTIVE DATA SUMMARY:   HISTORY:   Past Medical History:   Diagnosis Date    Cardiomyopathy ([facility] [address] 12/3/2020    HFrEF (heart failure with reduced ejection fraction) ([facility] [address] 12/3/2020    Hypertension, uncontrolled 12/3/2020    Mobitz type 2 second degree atrioventricular block 03/7/0193    Systolic heart failure ([facility] [address] 12/3/2020   History reviewed. No pertinent surgical history.     Expanded or extensive additional review of patient history:     Home Situation  Home Environment: Private residence  # Steps to Enter: 0  One/Two Story Residence: One story  Living Alone: No  Support Systems: Child(brandon), Family member(s)  Patient Expects to be Discharged to[de-identified] Private residence  Current DME Used/Available at Home: Walker, rolling  Tub or Shower Type: Tub/Shower combination    Hand dominance: Right    EXAMINATION OF PERFORMANCE DEFICITS:  Cognitive/Behavioral Status:  Neurologic State: Alert; Drowsy  Orientation Level: Disoriented to time;Disoriented to situation;Oriented to person;Oriented to place  Cognition: Decreased attention/concentration;Decreased command following; Impaired decision making;Memory loss;Poor safety awareness  Perception: Appears intact  Perseveration: No perseveration noted  Safety/Judgement: Decreased insight into deficits; Decreased awareness of need for safety    Hearing: Auditory  Auditory Impairment: None    Vision/Perceptual:                           Acuity: Within Defined Limits    Corrective Lenses: Glasses(progressive lenses)    Range of Motion:  B UE grossly WFL                            Strength:  B UE generally decreased, but functional                   Coordination:     Fine Motor Skills-Upper: Left Intact; Right Intact(grossly)    Gross Motor Skills-Upper: Left Intact; Right Intact    Tone & Sensation:                            Balance:  Sitting: Intact  Standing: (unable to stand)    Functional Mobility and Transfers for ADLs:  Bed Mobility:  Supine to Sit: Moderate assistance;Assist x1  Sit to Supine: Moderate assistance;Assist x2  Scooting: Moderate assistance;Assist x2    Transfers:  Sit to Stand: (unable to stand)  Toilet Transfer : (unsafe to attempt)    ADL Assessment:  Feeding: Setup    Oral Facial Hygiene/Grooming: Minimum assistance    Bathing: Maximum assistance    Upper Body Dressing: Moderate assistance    Lower Body Dressing: Total assistance    Toileting: Total assistance                Functional Measure:  Barthel Index:    Bathin  Bladder: 0  Bowels: 0  Groomin  Dressin  Feedin  Mobility: 0  Stairs: 0  Toilet Use: 0  Transfer (Bed to Chair and Back): 5  Total: 10/100        The Barthel ADL Index: Guidelines  1. The index should be used as a record of what a patient does, not as a record of what a patient could do.   2. The main aim is to establish degree of independence from any help, physical or verbal, however minor and for whatever reason. 3. The need for supervision renders the patient not independent. 4. A patient's performance should be established using the best available evidence. Asking the patient, friends/relatives and nurses are the usual sources, but direct observation and common sense are also important. However direct testing is not needed. 5. Usually the patient's performance over the preceding 24-48 hours is important, but occasionally longer periods will be relevant. 6. Middle categories imply that the patient supplies over 50 per cent of the effort. 7. Use of aids to be independent is allowed. Patel Hill., Barthel, D.W. (9430). Functional evaluation: the Barthel Index. [address] [facility] (14)2. Sweetie Harmon frank FLORES Chandra, Albert Holbrook., Jie Frederick., [place], [address] (1999). Measuring the change indisability after inpatient rehabilitation; comparison of the responsiveness of the Barthel Index and Functional [place] Measure. Journal of Neurology, Neurosurgery, and Psychiatry, 66(4), 927-765. Jono Boateng, N.J.A, MARILU Pratt, & Ayush Neville MTRACY. (2004.) Assessment of post-stroke quality of life in cost-effectiveness studies: The usefulness of the Barthel Index and the EuroQoL-5D. Quality of Life Research, 15, 916-41        Occupational Therapy Evaluation Charge Determination   History Examination Decision-Making   MEDIUM Complexity : Expanded review of history including physical, cognitive and psychosocial  history  HIGH Complexity : 5 or more performance deficits relating to physical, cognitive , or psychosocial skils that result in activity limitations and / or participation restrictions MEDIUM Complexity : Patient may present with comorbidities that affect occupational performnce.  Miniml to moderate modification of tasks or assistance (eg, physical or verbal ) with assesment(s) is necessary to enable patient to complete evaluation       Based on the above components, the patient evaluation is determined to be of the following complexity level: MEDIUM  Pain Rating:  10/10 B LE    Activity Tolerance:   Poor and requires rest breaks    After treatment patient left in no apparent distress:    Supine in bed, Heels elevated for pressure relief, Call bell within reach, and Side rails x 3    COMMUNICATION/EDUCATION:   The patients plan of care was discussed with: Physical therapist and Registered nurse. Patient is unable to participate in goal setting and plan of care. This patients plan of care is appropriate for delegation to [facility].     Thank you for this referral.  Loyd Acosta OTR/L  Time Calculation: 25 mins

## 2020-12-07 NOTE — PROGRESS NOTES
[facility]      [address] [place] [address]  [phone]      Cardiology Progress Note      12/7/2020 2:16 PM    Admit Date: 12/1/2020    Admit Diagnosis:   Cellulitis [L03.90]    Subjective:     Dayan Khanna     No complaints    Visit Vitals  BP (!) 167/83   Pulse 91   Temp 98.5 °F (36.9 °C)   Resp 16   Ht 5' 4\" (1.626 m)   Wt 115 lb (52.2 kg)   SpO2 100%   Breastfeeding No   BMI 19.74 kg/m²       Current Facility-Administered Medications   Medication Dose Route Frequency    hydrALAZINE (APRESOLINE) tablet 100 mg  100 mg Oral TID    morphine injection 1 mg  1 mg IntraVENous Q4H PRN    polyethylene glycol (MIRALAX) packet 17 g  17 g Oral DAILY    bisacodyL (DULCOLAX) suppository 10 mg  10 mg Rectal DAILY PRN    cefepime (MAXIPIME) 2 g in 0.9% sodium chloride (MBP/ADV) 100 mL MBP  2 g IntraVENous Q24H    traMADoL (ULTRAM) tablet 50 mg  50 mg Oral Q6H PRN    nitroglycerin (NITROBID) 2 % ointment 1 Inch  1 Inch Topical TID    amLODIPine (NORVASC) tablet 10 mg  10 mg Oral DAILY    pantoprazole (PROTONIX) tablet 40 mg  40 mg Oral ACB    cloNIDine HCL (CATAPRES) tablet 0.2 mg  0.2 mg Oral TID    hydrALAZINE (APRESOLINE) 20 mg/mL injection 10 mg  10 mg IntraVENous Q6H PRN    aspirin delayed-release tablet 81 mg  81 mg Oral DAILY    ALPRAZolam (XANAX) tablet 0.25 mg  0.25 mg Oral BID PRN    mupirocin (BACTROBAN) 2 % ointment   Topical DAILY    acetaminophen (TYLENOL) tablet 650 mg  650 mg Oral Q6H PRN    sodium chloride (NS) flush 5-40 mL  5-40 mL IntraVENous Q8H    sodium chloride (NS) flush 5-40 mL  5-40 mL IntraVENous PRN    polyethylene glycol (MIRALAX) packet 17 g  17 g Oral DAILY PRN    ondansetron (ZOFRAN) injection 4 mg  4 mg IntraVENous Q6H PRN    heparin (porcine) injection 5,000 Units  5,000 Units SubCUTAneous Q12H    potassium chloride (K-DUR, KLOR-CON) SR tablet 20 mEq  20 mEq Oral DAILY       Objective:      Physical Exam:  General Appearance:    Chest:   Clear  Cardiovascular: RRR  Extremities: no edema  Skin:  Warm and dry.     Data Review:   Recent Labs     12/07/20  0307 12/06/20  0323 12/05/20 0227   WBC 10.4 10.9 10.1   HGB 10.2* 10.8* 9.5*   HCT 32.0* 35.5 29.4*    185 159     Recent Labs     12/07/20  0307 12/06/20  0323 12/05/20 0227    137 139   K 3.4* 3.7 3.8    108 107   CO2 26 22 24   * 102* 120*   BUN 36* 39* 43*   CREA 1.24* 1.39* 1.80*   CA 8.4* 8.4* 8.3*       No results for input(s): TROIQ, CPK, CKMB in the last 72 hours. Intake/Output Summary (Last 24 hours) at 12/7/2020 1416  Last data filed at 12/7/2020 1319  Gross per 24 hour   Intake 1520 ml   Output 100 ml   Net 1420 ml        Telemetry:   EKG:  Cxray:    Assessment:     Principal Problem:    Cellulitis (12/1/2020)    Active Problems:    Cardiomyopathy ([facility] [address] (85/9/5766)      Systolic heart failure ([facility] [address] (12/3/2020)      HFrEF (heart failure with reduced ejection fraction) ([facility] [address] (12/3/2020)      Hypertension, uncontrolled (12/3/2020)      Mobitz type 2 second degree atrioventricular block (12/3/2020)        Plan:     BP Rx fairly maxed out and diastolic BP's OK.   Jody Becerra for her ischemic w/u    Yamile Mcgee M.D., [facility]

## 2020-12-07 NOTE — PROGRESS NOTES
Comprehensive Nutrition Assessment    Type and Reason for Visit: Reassess    Nutrition Recommendations/Plan:   · Continue diet as tolerated. Pt selecting from menu. · RD ordered Ensure Clear po TID with meals. Prefers mixed berry. · Please document % meals and supplements consumed in flowsheet I/O's under intake. Nutrition Assessment:     12/7: Chart reviewed; med noted for cellulitis, new onset cardiomyopathy, EF 35-40%. RD visited with pt at bedside. Pt is on a Cardiac diet but pt reports that she is just ordering chicken broth and gelatin as pt states \"that food is too heavy for me\". Pt agreeable to try Ensure Clear to assist with meeting nutritional needs. Patient Vitals for the past 72 hrs:   % Diet Eaten   12/07/20 1319 100 %   12/07/20 0938 100 %   12/06/20 1714 100 %   12/06/20 0803 100 %   12/04/20 1715 40 %     Last Weight Metric  Weight Loss Metrics 12/2/2020   Today's Wt 115 lb   BMI 19.74 kg/m2       Estimated Daily Nutrient Needs:  Energy (kcal): MSJ 1550 (992 x 1.3 +250 for wt gain); Weight Used for Energy Requirements: Current  Protein (g): 52-63g (1-1.2gPro/kg); Weight Used for Protein Requirements: Current  Fluid (ml/day): 1600mL;  Method Used for Fluid Requirements: 1 ml/kcal    Nutrition Related Findings:  Meds: miralax, KCl; Labs: K+ 3.4, H/H 10.2/32.0      Wounds:    Multiple(cellulitis-LE)       Current Nutrition Therapies:  DIET CARDIAC Regular  DIET NUTRITIONAL SUPPLEMENTS Breakfast, Lunch, Dinner; Ensure Clear    Anthropometric Measures:  · Height:  5' 4\" (162.6 cm)  · Current Body Wt:  52.2 kg (115 lb 1.3 oz)   · Ideal Body Wt:  120 lbs:  95.9 %   · BMI Category:  Underweight (BMI less than 22) age over 72       Nutrition Diagnosis:   No nutrition diagnosis at this time     Nutrition Interventions:   Food and/or Nutrient Delivery: Start oral nutrition supplement  Nutrition Education and Counseling: No recommendations at this time  Coordination of Nutrition Care: Continue to monitor while inpatient    Goals:  Pt will consume >50% of meals in 3-5 days.        Nutrition Monitoring and Evaluation:   Behavioral-Environmental Outcomes: None identified  Food/Nutrient Intake Outcomes: Food and nutrient intake, Supplement intake, Diet advancement/tolerance  Physical Signs/Symptoms Outcomes: Biochemical data, Fluid status or edema, Weight, Nutrition focused physical findings, GI status    Discharge Planning:    Continue current diet     Electronically signed by Tyler Fischer RD on 12/7/2020 at 3:23 PM

## 2020-12-07 NOTE — PROGRESS NOTES
Problem: Mobility Impaired (Adult and Pediatric)  Goal: *Acute Goals and Plan of Care (Insert Text)  Description: FUNCTIONAL STATUS PRIOR TO ADMISSION: Patient was modified independent using a rolling walker for functional mobility. (per patient; unsure of accuracy)    HOME SUPPORT PRIOR TO ADMISSION: The patient lived with two sons. Physical Therapy Goals  Initiated 12/7/2020  1. Patient will move from supine to sit and sit to supine  in bed with supervision/set-up within 7 day(s). 2.  Patient will transfer from bed to chair and chair to bed with moderate assistance  using the least restrictive device within 7 day(s). 3.  Patient will perform sit to stand with minimal assistance/contact guard assist within 7 day(s). 4.  Patient will ambulate with minimal assistance/contact guard assist for 10 feet with the least restrictive device within 7 day(s). Note:   PHYSICAL THERAPY EVALUATION  Patient: Lisandra Norton (63 y.o. female)  Date: 12/7/2020  Primary Diagnosis: Cellulitis [L03.90]        Precautions:   Fall    ASSESSMENT  Based on the objective data described below, the patient presents with generalized weakness, decreased activity tolerance, and impaired functional mobility skills. Patient is also fearful of falling with most mobility. Bed mobility with mod assist x 1. Able to sit EOB independently. Patient unable to come to stand with two attempts and max assist.  When attempting to stand, patient kept NWB L LE due to possible fracture L toe (after session, MRI noted to not indicate fracture). Patient resists attempt to stand and pushes posteriorly. Unable to safely transfer to chair; recommend vashti lift and lift team for transfers. Per patient, patient was mobile with rolling walker prior to admission. Unsure of amount of assistance required at home. Recommend SNF at discharge.     Current Level of Function Impacting Discharge (mobility/balance): mod assist    Functional Outcome Measure: The patient scored 10/100 on the Barthel Index outcome measure which is indicative of dependent mobility/ADL's. Other factors to consider for discharge: assistance available at home, weightbearing status L LE     Patient will benefit from skilled therapy intervention to address the above noted impairments. PLAN :  Recommendations and Planned Interventions: bed mobility training, transfer training, gait training, therapeutic exercises, and therapeutic activities      Frequency/Duration: Patient will be followed by physical therapy:  4 times a week to address goals. Recommendation for discharge: (in order for the patient to meet his/her long term goals)  Therapy up to 5 days/week in SNF setting    This discharge recommendation:  Has not yet been discussed the attending provider and/or case management    IF patient discharges home will need the following DME: to be determined (TBD)         SUBJECTIVE:   Patient stated I'll try.     OBJECTIVE DATA SUMMARY:   HISTORY:    Past Medical History:   Diagnosis Date    Cardiomyopathy ([facility] 12/3/2020    HFrEF (heart failure with reduced ejection fraction) ([facility] 12/3/2020    Hypertension, uncontrolled 12/3/2020    Mobitz type 2 second degree atrioventricular block 56/7/6945    Systolic heart failure ([facility] [address] 12/3/2020   History reviewed. No pertinent surgical history.     Personal factors and/or comorbidities impacting plan of care: LE pain, ulcers, fracture of    Home Situation  Home Environment: Private residence  # Steps to Enter: 0  One/Two Story Residence: One story  Living Alone: No  Support Systems: Child(brandon), Family member(s)  Patient Expects to be Discharged to[de-identified] Private residence  Current DME Used/Available at Home: Walker, rolling  Tub or Shower Type: Tub/Shower combination    EXAMINATION/PRESENTATION/DECISION MAKING:   Critical Behavior:  Neurologic State: Alert, Drowsy  Orientation Level: Disoriented to time, Disoriented to situation, Oriented to person, Oriented to place  Cognition: Decreased attention/concentration, Decreased command following, Impaired decision making, Memory loss, Poor safety awareness  Safety/Judgement: Decreased insight into deficits, Decreased awareness of need for safety  Hearing: Auditory  Auditory Impairment: None  Skin:  multiple wounds LE's  Edema: LE's  Range Of Motion:  AROM: Generally decreased, functional           PROM: Generally decreased, functional           Strength:    Strength: Generally decreased, functional                    Tone & Sensation:   Tone: Normal              Sensation: Intact               Coordination:  Coordination: Generally decreased, functional  Vision:   Acuity: Within Defined Limits  Corrective Lenses: Glasses(progressive lenses)  Functional Mobility:  Bed Mobility:  Rolling: Moderate assistance  Supine to Sit: Moderate assistance;Assist x1  Sit to Supine: Moderate assistance;Assist x2  Scooting: Moderate assistance;Assist x2  Transfers:  Sit to Stand: (unable to stand)                          Balance:   Sitting: Intact  Standing: (unable to stand)  Ambulation/Gait Training:              Gait Description (WDL): (unable)     Right Side Weight Bearing: Full  Left Side Weight Bearing: Full                                 Functional Measure:  Barthel Index:    Bathin  Bladder: 0  Bowels: 0  Groomin  Dressin  Feedin  Mobility: 0  Stairs: 0  Toilet Use: 0  Transfer (Bed to Chair and Back): 5  Total: 10/100       The Barthel ADL Index: Guidelines  1. The index should be used as a record of what a patient does, not as a record of what a patient could do. 2. The main aim is to establish degree of independence from any help, physical or verbal, however minor and for whatever reason. 3. The need for supervision renders the patient not independent. 4. A patient's performance should be established using the best available evidence.  Asking the patient, friends/relatives and nurses are the usual sources, but direct observation and common sense are also important. However direct testing is not needed. 5. Usually the patient's performance over the preceding 24-48 hours is important, but occasionally longer periods will be relevant. 6. Middle categories imply that the patient supplies over 50 per cent of the effort. 7. Use of aids to be independent is allowed. Braden Damon., Barthel, D.W. (2580). Functional evaluation: the Barthel Index. [address] [facility] (14)2. Chares Show frank [place], J.J.M.F, Ramy Alvarez., Arlen Ortega., [place], [address] (1999). Measuring the change indisability after inpatient rehabilitation; comparison of the responsiveness of the Barthel Index and Functional Hulbert Measure. Journal of Neurology, Neurosurgery, and Psychiatry, 66(4), 585-456. ALICIA Golden, MARILU Pratt, & Brit Carter MTRACY. (2004.) Assessment of post-stroke quality of life in cost-effectiveness studies: The usefulness of the Barthel Index and the EuroQoL-5D. Quality of Life Research, 15, 374-15            Physical Therapy Evaluation Charge Determination   History Examination Presentation Decision-Making   MEDIUM  Complexity : 1-2 comorbidities / personal factors will impact the outcome/ POC  MEDIUM Complexity : 3 Standardized tests and measures addressing body structure, function, activity limitation and / or participation in recreation  LOW Complexity : Stable, uncomplicated  LOW Complexity : FOTO score of       Based on the above components, the patient evaluation is determined to be of the following complexity level: LOW     Pain Rating:  10/10    Activity Tolerance:   Fair    After treatment patient left in no apparent distress:   Supine in bed, Heels elevated for pressure relief, Call bell within reach, and Side rails x 3    COMMUNICATION/EDUCATION:   The patients plan of care was discussed with: Occupational therapist and Registered nurse.      Fall prevention education was provided and the patient/caregiver indicated understanding. and Patient/family agree to work toward stated goals and plan of care.     Thank you for this referral.  Suzie Gomez, PT   Time Calculation: 25 mins

## 2020-12-07 NOTE — PROGRESS NOTES
Hospitalist Progress Note    NAME: Albertina You   :  1945   MRN:  [id number]       Assessment / Plan:  LE cellulitis  MRI L foot neg for any fracture. No OM. Nonspecific diffuse subcutaneous edema. 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 R resting CARISSA is nl. R TBI 1.01.  L resting CARISSA is normal, L TIB 1.01  A1C 5.9  Procalcitonin <0.05, wound cx showed staph aureus and heavy diptheroids  Will cont' empiric IV abx: cefepime. Stop vanc and flagyl  Tramadol prn  Podiatry and wound care following, appreciate recs   Up in chair, out of bed    PT/OT  New onset of cardiomyopathy, EF 35-40%  HTN  2nd deg AV block  Chest pain, resolved  Coreg discontinued due to 2nd deg heart block seen on tele  Trop neg, EKG without acute changes  Cont' Norvasc, clonidine, nitrobid. titrating ydralazine  Not on ACEi/ARBs due to TONJA  Pain control  Cardiology following    TONJA, likely from diuretics and recent contrast use. Avoid nephrotoxic agents. Cr improving     Hypokalemia, repleted    PNA on CXR  CXR showed R pleural effusion with basilar air space disease  probnp elevated   Pt already on IV abx as above  Lasix on hold   Monitor daily lytes, strict I&O    TONJA vs CKD3  Cr trending down, monitor      Code status: Full  Prophylaxis: Hep SQ  Recommended Disposition: TBD     Subjective:     Chief Complaint / Reason for Physician Visit  Pt seen during PT/OT session. Pt very weak, unable to ambulate. BP high but she is in a lot of pain during PT/OT session. Discussed with RN events overnight.      Review of Systems:  Symptom Y/N Comments  Symptom Y/N Comments   Fever/Chills    Chest Pain     Poor Appetite    Edema     Cough    Abdominal Pain     Sputum    Joint Pain     SOB/LACY    Pruritis/Rash     Nausea/vomit    Tolerating PT/OT     Diarrhea    Tolerating Diet     Constipation    Other       Could NOT obtain due to:      Objective:     VITALS:   Last 24hrs VS reviewed since prior progress note. Most recent are:  Patient Vitals for the past 24 hrs:   Temp Pulse Resp BP SpO2   12/07/20 0733 98.3 °F (36.8 °C) 91 16 (!) 172/76 100 %   12/07/20 0251    (!) 162/87    12/07/20 0241 97.5 °F (36.4 °C) 78 15 (!) 170/92 100 %   12/06/20 2131 97.4 °F (36.3 °C) 93 17 (!) 172/98 100 %   12/06/20 1538  81  (!) 155/76    12/06/20 1453 98.7 °F (37.1 °C) 84 16 (!) 165/67 98 %   12/06/20 1043 98.4 °F (36.9 °C) 83 16 (!) 159/75 98 %       Intake/Output Summary (Last 24 hours) at 12/7/2020 0933  Last data filed at 12/7/2020 0304  Gross per 24 hour   Intake 1040 ml   Output 100 ml   Net 940 ml        I had a face to face encounter and independently examined this patient on 12/7/2020, as outlined below:  PHYSICAL EXAM:  General: WD, WN. Alert, cooperative, no acute distress    EENT:  EOMI. Anicteric sclerae. MMM  Resp:  CTA bilaterally, no wheezing or rales. No accessory muscle use  CV:  Regular  rhythm,  No edema  GI:  Soft, Non distended, Non tender. +BS  Neurologic:  Alert and oriented X 3, normal speech  Psych:   Not anxious nor agitated  Skin:  Dry scabs on b/l LE. Black discoloration of L 2nd toe. Ulceration on R dorsal forefoot. Reviewed most current lab test results and cultures  YES  Reviewed most current radiology test results   YES  Review and summation of old records today    NO  Reviewed patient's current orders and MAR    YES  PMH/SH reviewed - no change compared to H&P  ________________________________________________________________________  Care Plan discussed with:    Comments   Patient x    Family      RN x    Care Manager     Consultant                        Multidiciplinary team rounds were held today with , nursing, pharmacist and clinical coordinator. Patient's plan of care was discussed; medications were reviewed and discharge planning was addressed.      ________________________________________________________________________  Total NON critical care TIME: 35 Minutes    Total CRITICAL CARE TIME Spent:   Minutes non procedure based      Comments   >50% of visit spent in counseling and coordination of care     ________________________________________________________________________  Victor Manuel Fernández MD     Procedures: see electronic medical records for all procedures/Xrays and details which were not copied into this note but were reviewed prior to creation of Plan. LABS:  I reviewed today's most current labs and imaging studies.   Pertinent labs include:  Recent Labs     12/07/20 0307 12/06/20 0323 12/05/20 0227   WBC 10.4 10.9 10.1   HGB 10.2* 10.8* 9.5*   HCT 32.0* 35.5 29.4*    185 159     Recent Labs     12/07/20 0307 12/06/20 0323 12/05/20 0227    137 139   K 3.4* 3.7 3.8    108 107   CO2 26 22 24   * 102* 120*   BUN 36* 39* 43*   CREA 1.24* 1.39* 1.80*   CA 8.4* 8.4* 8.3*       Signed: Victor Manuel Fernández MD

## 2020-12-07 NOTE — PROGRESS NOTES
Problem: Falls - Risk of  Goal: *Absence of Falls  Description: Document Richard Merlin Fall Risk and appropriate interventions in the flowsheet. Outcome: Progressing Towards Goal  Note: Fall Risk Interventions:  Mobility Interventions: Bed/chair exit alarm, Communicate number of staff needed for ambulation/transfer         Medication Interventions: Evaluate medications/consider consulting pharmacy    Elimination Interventions: Call light in reach, Bed/chair exit alarm, Patient to call for help with toileting needs, Toileting schedule/hourly rounds    History of Falls Interventions: Bed/chair exit alarm, Door open when patient unattended         Problem: Pressure Injury - Risk of  Goal: *Prevention of pressure injury  Description: Document Darien Scale and appropriate interventions in the flowsheet.   Outcome: Progressing Towards Goal  Note: Pressure Injury Interventions:  Sensory Interventions: Assess changes in LOC, Assess need for specialty bed, Keep linens dry and wrinkle-free, Maintain/enhance activity level, Monitor skin under medical devices    Moisture Interventions: Absorbent underpads, Assess need for specialty bed, Check for incontinence Q2 hours and as needed, Internal/External urinary devices    Activity Interventions: Assess need for specialty bed    Mobility Interventions: Assess need for specialty bed    Nutrition Interventions: Document food/fluid/supplement intake    Friction and Shear Interventions: Apply protective barrier, creams and emollients, Feet elevated on foot rest, Foam dressings/transparent film/skin sealants, Lift sheet, Lift team/patient mobility team, Transferring/repositioning devices                Problem: Pain  Goal: *Control of Pain  Outcome: Progressing Towards Goal     Problem: Hypertension  Goal: *Blood pressure within specified parameters  Outcome: Progressing Towards Goal  Goal: *Fluid volume balance  Outcome: Progressing Towards Goal  Goal: *Labs within defined limits  Outcome: Progressing Towards Goal

## 2020-12-07 NOTE — PROGRESS NOTES
.End of Shift Note    Bedside shift change report given to Nirav Alvarez RN (oncoming nurse) by Melba Osgood (offgoing nurse). Report included the following information SBAR, Kardex, MAR and Recent Results    Shift worked:  7p-7a     Shift summary and any significant changes:     Patient exhibited asymptomatic HTN, PRN hydralazine administered. Will continue to monitor BP, all other VS stable. Patient c/o pain and was given PRN tramadol and tylenol, stated relief. Concerns for physician to address:  none   Zone phone for oncoming shift:  [phone]     Activity:  Activity Level: Bed Rest      Cardiac:   Cardiac Monitoring: yes     Cardiac Rhythm: Normal sinus rhythm    Access:   Current line(s): 22 l forearm     Genitourinary:   Urinary status: voiding, external catheter     Respiratory:   O2 Device: Room air  Chronic home O2 use?:no        GI:  Last Bowel Movement Date: 12/07/20  Current diet:  DIET CARDIAC Regular  Passing flatus: yes  Tolerating current diet: yes  % Diet Eaten: 100 %    Pain Management:   Patient states pain is manageable on current regimen: yes    Skin:  Darien Score: 13  Interventions:assess need for specialty bed, monitor for incontinence    Patient Safety:  Fall Score:  Total Score: 4  Interventions: bed alarm, evaluate meds, door open when unattended   High Fall Risk: Yes    Length of Stay:  Expected LOS: 4d 2h  Actual LOS: [address]

## 2020-12-08 ENCOUNTER — APPOINTMENT (OUTPATIENT)
Dept: NUCLEAR MEDICINE | Age: 75
DRG: 291 | End: 2020-12-08
Attending: NURSE PRACTITIONER
Payer: MEDICARE

## 2020-12-08 ENCOUNTER — APPOINTMENT (OUTPATIENT)
Dept: NON INVASIVE DIAGNOSTICS | Age: 75
DRG: 291 | End: 2020-12-08
Attending: NURSE PRACTITIONER
Payer: MEDICARE

## 2020-12-08 LAB
ANION GAP SERPL CALC-SCNC: 5 MMOL/L (ref 5–15)
BASOPHILS # BLD: 0 K/UL (ref 0–0.1)
BASOPHILS NFR BLD: 0 % (ref 0–1)
BUN SERPL-MCNC: 28 MG/DL (ref 6–20)
BUN/CREAT SERPL: 27 (ref 12–20)
CALCIUM SERPL-MCNC: 8.4 MG/DL (ref 8.5–10.1)
CHLORIDE SERPL-SCNC: 110 MMOL/L (ref 97–108)
CO2 SERPL-SCNC: 23 MMOL/L (ref 21–32)
CREAT SERPL-MCNC: 1.05 MG/DL (ref 0.55–1.02)
DIFFERENTIAL METHOD BLD: ABNORMAL
EOSINOPHIL # BLD: 0.2 K/UL (ref 0–0.4)
EOSINOPHIL NFR BLD: 2 % (ref 0–7)
ERYTHROCYTE [DISTWIDTH] IN BLOOD BY AUTOMATED COUNT: 15.1 % (ref 11.5–14.5)
GLUCOSE SERPL-MCNC: 106 MG/DL (ref 65–100)
HCT VFR BLD AUTO: 33.3 % (ref 35–47)
HGB BLD-MCNC: 10.5 G/DL (ref 11.5–16)
IMM GRANULOCYTES # BLD AUTO: 0.1 K/UL (ref 0–0.04)
IMM GRANULOCYTES NFR BLD AUTO: 1 % (ref 0–0.5)
LYMPHOCYTES # BLD: 1.3 K/UL (ref 0.8–3.5)
LYMPHOCYTES NFR BLD: 13 % (ref 12–49)
MCH RBC QN AUTO: 28.5 PG (ref 26–34)
MCHC RBC AUTO-ENTMCNC: 31.5 G/DL (ref 30–36.5)
MCV RBC AUTO: 90.5 FL (ref 80–99)
MONOCYTES # BLD: 1.1 K/UL (ref 0–1)
MONOCYTES NFR BLD: 11 % (ref 5–13)
NEUTS SEG # BLD: 7.4 K/UL (ref 1.8–8)
NEUTS SEG NFR BLD: 73 % (ref 32–75)
NRBC # BLD: 0 K/UL (ref 0–0.01)
NRBC BLD-RTO: 0 PER 100 WBC
PLATELET # BLD AUTO: 189 K/UL (ref 150–400)
POTASSIUM SERPL-SCNC: 3.9 MMOL/L (ref 3.5–5.1)
RBC # BLD AUTO: 3.68 M/UL (ref 3.8–5.2)
SODIUM SERPL-SCNC: 138 MMOL/L (ref 136–145)
STRESS BASELINE DIAS BP: 81 MMHG
STRESS BASELINE HR: 92 BPM
STRESS BASELINE SYS BP: 166 MMHG
STRESS ESTIMATED WORKLOAD: 1 METS
STRESS EXERCISE DUR MIN: NORMAL
STRESS O2 SAT PEAK: 100 %
STRESS O2 SAT REST: 99 %
STRESS PEAK DIAS BP: 81 MMHG
STRESS PEAK SYS BP: 166 MMHG
STRESS PERCENT HR ACHIEVED: 73 %
STRESS POST PEAK HR: 105 BPM
STRESS RATE PRESSURE PRODUCT: NORMAL BPM*MMHG
STRESS ST DEPRESSION: 0 MM
STRESS ST ELEVATION: 0 MM
STRESS TARGET HR: 143 BPM
WBC # BLD AUTO: 10.1 K/UL (ref 3.6–11)

## 2020-12-08 PROCEDURE — 65660000000 HC RM CCU STEPDOWN

## 2020-12-08 PROCEDURE — 74011250636 HC RX REV CODE- 250/636: Performed by: INTERNAL MEDICINE

## 2020-12-08 PROCEDURE — 80048 BASIC METABOLIC PNL TOTAL CA: CPT

## 2020-12-08 PROCEDURE — 74011250637 HC RX REV CODE- 250/637: Performed by: INTERNAL MEDICINE

## 2020-12-08 PROCEDURE — 93017 CV STRESS TEST TRACING ONLY: CPT

## 2020-12-08 PROCEDURE — 99232 SBSQ HOSP IP/OBS MODERATE 35: CPT | Performed by: INTERNAL MEDICINE

## 2020-12-08 PROCEDURE — A9500 TC99M SESTAMIBI: HCPCS

## 2020-12-08 PROCEDURE — 74011000258 HC RX REV CODE- 258: Performed by: INTERNAL MEDICINE

## 2020-12-08 PROCEDURE — 74011250636 HC RX REV CODE- 250/636: Performed by: NURSE PRACTITIONER

## 2020-12-08 PROCEDURE — 85025 COMPLETE CBC W/AUTO DIFF WBC: CPT

## 2020-12-08 PROCEDURE — 87635 SARS-COV-2 COVID-19 AMP PRB: CPT

## 2020-12-08 PROCEDURE — 36415 COLL VENOUS BLD VENIPUNCTURE: CPT

## 2020-12-08 PROCEDURE — 74011250637 HC RX REV CODE- 250/637: Performed by: NURSE PRACTITIONER

## 2020-12-08 PROCEDURE — 94760 N-INVAS EAR/PLS OXIMETRY 1: CPT

## 2020-12-08 RX ORDER — SODIUM CHLORIDE 0.9 % (FLUSH) 0.9 %
10 SYRINGE (ML) INJECTION AS NEEDED
Status: DISCONTINUED | OUTPATIENT
Start: 2020-12-08 | End: 2020-12-10 | Stop reason: HOSPADM

## 2020-12-08 RX ADMIN — CLONIDINE HYDROCHLORIDE 0.2 MG: 0.1 TABLET ORAL at 22:18

## 2020-12-08 RX ADMIN — HEPARIN SODIUM 5000 UNITS: 5000 INJECTION INTRAVENOUS; SUBCUTANEOUS at 08:31

## 2020-12-08 RX ADMIN — NITROGLYCERIN 1 INCH: 20 OINTMENT TOPICAL at 15:06

## 2020-12-08 RX ADMIN — MUPIROCIN: 20 OINTMENT TOPICAL at 08:44

## 2020-12-08 RX ADMIN — REGADENOSON 0.4 MG: 0.08 INJECTION, SOLUTION INTRAVENOUS at 10:52

## 2020-12-08 RX ADMIN — HYDRALAZINE HYDROCHLORIDE 100 MG: 50 TABLET, FILM COATED ORAL at 15:06

## 2020-12-08 RX ADMIN — NITROGLYCERIN 1 INCH: 20 OINTMENT TOPICAL at 22:19

## 2020-12-08 RX ADMIN — NITROGLYCERIN 1 INCH: 20 OINTMENT TOPICAL at 08:29

## 2020-12-08 RX ADMIN — CLONIDINE HYDROCHLORIDE 0.2 MG: 0.1 TABLET ORAL at 15:06

## 2020-12-08 RX ADMIN — Medication 10 ML: at 22:21

## 2020-12-08 RX ADMIN — CEFEPIME HYDROCHLORIDE 2 G: 2 INJECTION, POWDER, FOR SOLUTION INTRAVENOUS at 06:06

## 2020-12-08 RX ADMIN — AMLODIPINE BESYLATE 10 MG: 5 TABLET ORAL at 08:29

## 2020-12-08 RX ADMIN — Medication 10 ML: at 06:06

## 2020-12-08 RX ADMIN — Medication 10 ML: at 14:00

## 2020-12-08 RX ADMIN — HYDRALAZINE HYDROCHLORIDE 10 MG: 20 INJECTION INTRAMUSCULAR; INTRAVENOUS at 00:26

## 2020-12-08 RX ADMIN — HYDRALAZINE HYDROCHLORIDE 100 MG: 50 TABLET, FILM COATED ORAL at 22:18

## 2020-12-08 RX ADMIN — Medication 10 ML: at 10:52

## 2020-12-08 RX ADMIN — POTASSIUM CHLORIDE 20 MEQ: 20 TABLET, EXTENDED RELEASE ORAL at 08:30

## 2020-12-08 RX ADMIN — POLYETHYLENE GLYCOL 3350 17 G: 17 POWDER, FOR SOLUTION ORAL at 08:29

## 2020-12-08 RX ADMIN — HEPARIN SODIUM 5000 UNITS: 5000 INJECTION INTRAVENOUS; SUBCUTANEOUS at 22:13

## 2020-12-08 RX ADMIN — ASPIRIN 81 MG: 81 TABLET, COATED ORAL at 08:30

## 2020-12-08 RX ADMIN — PANTOPRAZOLE SODIUM 40 MG: 40 TABLET, DELAYED RELEASE ORAL at 06:06

## 2020-12-08 RX ADMIN — HYDRALAZINE HYDROCHLORIDE 100 MG: 50 TABLET, FILM COATED ORAL at 08:30

## 2020-12-08 RX ADMIN — CLONIDINE HYDROCHLORIDE 0.2 MG: 0.1 TABLET ORAL at 08:30

## 2020-12-08 NOTE — PROGRESS NOTES
Occupational Therapy   Pt is off the floor for cardiac testing. Will defer and continue to follow as appropriate.

## 2020-12-08 NOTE — PROGRESS NOTES
Physical Therapy    Chart reviewed, patient is off the floor for nuclear medicine testing. Plan to defer and continue to follow.     Terri Galvez

## 2020-12-08 NOTE — PROGRESS NOTES
Podiatry    Subjective: Pt relates several weeks of wounds bilateral feet. She does not recall any injury. The wounds have been treated with antibiotics with some improvement, but then worsened when the antibiotics were discontinued. No specific wound care was performed at home. No new issues, pain is controlled by medication. Patient is a 66 y.o.  female who is being seen for wounds bilateral feet. Patient Active Problem List    Diagnosis Date Noted    Cardiomyopathy [facility]) 79/29/0416    Systolic heart failure ([facility] 12/03/2020    H[facility]EF (heart failure with reduced ejection fraction) ([facility] 12/03/2020    Hypertension, uncontrolled 12/03/2020    Mobitz type 2 second degree atrioventricular block 12/03/2020    Cellulitis 12/01/2020     Past Medical History:   Diagnosis Date    Cardiomyopathy [facility]) 12/3/2020    HFrEF (heart failure with reduced ejection fraction) ([facility] 12/3/2020    Hypertension, uncontrolled 12/3/2020    Mobitz type 2 second degree atrioventricular block 24/3/6446    Systolic heart failure ([facility] 12/3/2020      History reviewed. No pertinent surgical history.    Family History   Problem Relation Age of Onset    Diabetes Mother     Diabetes Father     Diabetes Son       Social History     Tobacco Use    Smoking status: Never Smoker   Substance Use Topics    Alcohol use: Never     Frequency: Never     Current Facility-Administered Medications   Medication Dose Route Frequency Provider Last Rate Last Dose    sodium chloride (NS) flush 10 mL  10 mL IntraVENous PRN Jessee Lab, NP        hydrALAZINE (APRESOLINE) tablet 100 mg  100 mg Oral TID Radha Zayas MD   100 mg at 12/08/20 0830    morphine injection 1 mg  1 mg IntraVENous Q4H PRN Radha Zayas MD        polyethylene glycol (MIRALAX) packet 17 g  17 g Oral DAILY Particque Zayas MD   17 g at 12/08/20 0829    bisacodyL (DULCOLAX) suppository 10 mg  10 mg Rectal DAILY PRN Radha Zayas MD        cefepime (MAXIPIME) 2 g in 0.9% sodium chloride (MBP/ADV) 100 mL MBP  2 g IntraVENous Q24H Rashaad Leal  mL/hr at 12/08/20 0606 2 g at 12/08/20 0606    traMADoL (ULTRAM) tablet 50 mg  50 mg Oral Q6H PRN Rashaad Leal MD   50 mg at 12/07/20 1641    nitroglycerin (NITROBID) 2 % ointment 1 Inch  1 Inch Topical TID Rashaad Leal MD   1 Inch at 12/08/20 0829    amLODIPine (NORVASC) tablet 10 mg  10 mg Oral DAILY Thania Agent, NP   10 mg at 12/08/20 0829    pantoprazole (PROTONIX) tablet 40 mg  40 mg Oral ACB Thania Agent, NP   40 mg at 12/08/20 0606    cloNIDine HCL (CATAPRES) tablet 0.2 mg  0.2 mg Oral TID Rashaad Leal MD   0.2 mg at 12/08/20 0830    hydrALAZINE (APRESOLINE) 20 mg/mL injection 10 mg  10 mg IntraVENous Q6H PRN Rashaad Leal MD   10 mg at 12/08/20 0026    aspirin delayed-release tablet 81 mg  81 mg Oral DAILY Rashaad Leal MD   81 mg at 12/08/20 0830    ALPRAZolam (XANAX) tablet 0.25 mg  0.25 mg Oral BID PRN Rashaad Leal MD        mupirocin (BACTROBAN) 2 % ointment   Topical DAILY Thaddeus Aguilar, DPM        acetaminophen (TYLENOL) tablet 650 mg  650 mg Oral Q6H PRN Martha Nichole MD   650 mg at 12/06/20 2147    sodium chloride (NS) flush 5-40 mL  5-40 mL IntraVENous Q8H Billy Jolly, DO   10 mL at 12/08/20 0606    sodium chloride (NS) flush 5-40 mL  5-40 mL IntraVENous PRN Billy Jolly, DO   10 mL at 12/08/20 1052    polyethylene glycol (MIRALAX) packet 17 g  17 g Oral DAILY PRN Celina Cruzg, DO   17 g at 12/03/20 0155    ondansetron (ZOFRAN) injection 4 mg  4 mg IntraVENous Q6H PRN Celina Cruzg, DO   4 mg at 12/03/20 1358    heparin (porcine) injection 5,000 Units  5,000 Units SubCUTAneous Q12H Shirlie Pong, DO   5,000 Units at 12/08/20 0831    potassium chloride (K-DUR, KLOR-CON) SR tablet 20 mEq  20 mEq Oral DAILY Billy Jolly, DO   20 mEq at 12/08/20 0830      No Known Allergies     Review of Systems:  AO x3. NAD.     Objective: Patient Vitals for the past 8 hrs:   BP Temp Pulse Resp SpO2   20 1118 (!) 135/55 97.2 °F (36.2 °C) 95 15 99 %   20 0838 (!) 168/70       20 0735 (!) 103/98 97.4 °F (36.3 °C) 95 16 99 %     Temp (24hrs), Av.3 °F (36.3 °C), Min:97.2 °F (36.2 °C), Max:97.4 °F (36.3 °C)      Physical Exam:    DP and PT 2/4; CFT less than 3 seconds. Toes warm bilaterally. Pitting edema with lipodermatosclerosis bilateral legs. Edema both feet. Open lesion to dermis dorsal right mid-foot about 1 x 1.5 cm with local erythema and edema and scant pus drainage. Multiple open wounds to dermis on the left 2nd and 3rd toes, no drainage or mal-odor. Reduced erythema and edema bilateral feet/toes. Sensation intact to light touch  Hammer toe deformity bilaterally. No current POP left 2nd toe. Lab Review:   Recent Results (from the past 24 hour(s))   CBC WITH AUTOMATED DIFF    Collection Time: 20  2:23 AM   Result Value Ref Range    WBC 10.1 3.6 - 11.0 K/uL    RBC 3.68 (L) 3.80 - 5.20 M/uL    HGB 10.5 (L) 11.5 - 16.0 g/dL    HCT 33.3 (L) 35.0 - 47.0 %    MCV 90.5 80.0 - 99.0 FL    MCH 28.5 26.0 - 34.0 PG    MCHC 31.5 30.0 - 36.5 g/dL    RDW 15.1 (H) 11.5 - 14.5 %    PLATELET 997 335 - 818 K/uL    NRBC 0.0 0  WBC    ABSOLUTE NRBC 0.00 0.00 - 0.01 K/uL    NEUTROPHILS 73 32 - 75 %    LYMPHOCYTES 13 12 - 49 %    MONOCYTES 11 5 - 13 %    EOSINOPHILS 2 0 - 7 %    BASOPHILS 0 0 - 1 %    IMMATURE GRANULOCYTES 1 (H) 0.0 - 0.5 %    ABS. NEUTROPHILS 7.4 1.8 - 8.0 K/UL    ABS. LYMPHOCYTES 1.3 0.8 - 3.5 K/UL    ABS. MONOCYTES 1.1 (H) 0.0 - 1.0 K/UL    ABS. EOSINOPHILS 0.2 0.0 - 0.4 K/UL    ABS. BASOPHILS 0.0 0.0 - 0.1 K/UL    ABS. IMM.  GRANS. 0.1 (H) 0.00 - 0.04 K/UL    DF AUTOMATED     METABOLIC PANEL, BASIC    Collection Time: 20  2:23 AM   Result Value Ref Range    Sodium 138 136 - 145 mmol/L    Potassium 3.9 3.5 - 5.1 mmol/L    Chloride 110 (H) 97 - 108 mmol/L    CO2 23 21 - 32 mmol/L    Anion gap 5 5 - 15 mmol/L    Glucose 106 (H) 65 - 100 mg/dL    BUN 28 (H) 6 - 20 MG/DL    Creatinine 1.05 (H) 0.55 - 1.02 MG/DL    BUN/Creatinine ratio 27 (H) 12 - 20      GFR est AA >60 >60 ml/min/1.73m2    GFR est non-AA 51 (L) >60 ml/min/1.73m2    Calcium 8.4 (L) 8.5 - 10.1 MG/DL   SARS-COV-2    Collection Time: 12/08/20 11:24 AM   Result Value Ref Range    Specimen source Nasopharyngeal      SARS-CoV-2 PENDING     SARS-CoV-2 PENDING     Specimen source Nasopharyngeal      COVID-19 rapid test PENDING     Specimen type NP Swab      Health status PENDING     COVID-19 PENDING    NUCLEAR CARDIAC STRESS TEST    Collection Time: 12/08/20 12:12 PM   Result Value Ref Range    Target  bpm    Exercise duration time 00:01:10     Stress Base Systolic  mmHg    Stress Base Diastolic BP 81 mmHg    Post peak  BPM    Baseline HR 92 BPM    Estimated workload 1.0 METS    Baseline  mmHg    O2 sat rest 99 %    Percent HR 73 %    O2 sat peak 100 %    Stress Base Diastolic BP 81 mmHg    Stress Rate Pressure Product 17,430 BPM*mmHg       Impression:   1. Open wounds to dermis bilateral feet, etiology uncertain. 2. Cellulitis left toes and right foot, improved    PLAN:  No significant change in wounds. Continue antibiotics and dressing changes. I will continue to follow periodically.

## 2020-12-08 NOTE — PROGRESS NOTES
Plan:  -[facility] [place]. for SNF  -? BLS      8:30AM  Pt accepted at [facility] for SNF. CM updated attending. COVID test ordered. Cardiac testing pending for today. CM spoke with Bernardo Boyer at [facility]. . initiated. Per attending, possible d/c in 24-48 hrs. Will likely require BLS transport. CM will continue to follow and assist with d/c planning.        STEPHEN Whyte  Care Manager

## 2020-12-08 NOTE — PROGRESS NOTES
End of Shift Note    Bedside shift change report given to Nighat Peter RN (oncoming nurse) by Saida Cain RN (offgoing nurse). Report included the following information SBAR, Kardex, Procedure Summary, Intake/Output, MAR, Accordion and Recent Results    Shift worked:  7a-7p     Shift summary and any significant changes:     Pt had stress test today. Otherwise uneventful shift. Drsgs done by Rosalba Osorio. Concerns for physician to address:  none     Zone phone for oncoming shift:   [phone]       Activity:  Activity Level: Bed Rest  Number times ambulated in hallways past shift: 0  Number of times OOB to chair past shift: 0    Cardiac:   Cardiac Monitoring: Yes      Cardiac Rhythm: Normal sinus rhythm    Access:   Current line(s): PIV     Genitourinary:   Urinary status: voiding    Respiratory:   O2 Device: Room air  Chronic home O2 use?: NO  Incentive spirometer at bedside: NO     GI:  Last Bowel Movement Date: 12/07/20  Current diet:  DIET NUTRITIONAL SUPPLEMENTS Breakfast, Lunch, Dinner; Ensure Clear  DIET CLEAR LIQUID  Passing flatus: YES  Tolerating current diet: YES  % Diet Eaten: 0 %    Pain Management:   Patient states pain is manageable on current regimen: YES    Skin:  Darien Score: 17  Interventions: increase time out of bed    Patient Safety:  Fall Score:  Total Score: 4  Interventions: pt to call before getting OOB and stay with me (per policy)  High Fall Risk: Yes    Length of Stay:  Expected LOS: 4d 2h  Actual LOS: 7      Saida Cain RN

## 2020-12-08 NOTE — PROGRESS NOTES
[facility]: [facility] Cardiac Stress in progress. Pt will come to [address] for stress images @ 12:30pm. Please check with ordering Physician regarding pt diet.

## 2020-12-08 NOTE — PROGRESS NOTES
Hospitalist Progress Note    NAME: Kaylee Jones   :  1945   MRN:  [id number]       Assessment / Plan:  LE cellulitis  MRI L foot neg for any fracture. No OM. Nonspecific diffuse subcutaneous edema. 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 R resting CARISSA is nl. R TBI 1.01.  L resting CARISSA is normal, L TIB 1.01  A1C 5.9  Procalcitonin <0.05, wound cx showed staph aureus and heavy diptheroids  Will cont' empiric IV abx: cefepime. Stop vanc and flagyl  Tramadol prn  Podiatry and wound care following, appreciate recs   Up in chair, out of bed   PT/OT, pt shuffles with assistance at baseline for ~1 yr.  Mainly bedbound   Called pt's rosauraece Georges Alvares and was able to give updates. Pt's son did not answer his phone. New onset of cardiomyopathy, EF 35-40%  HTN  2nd deg AV block  Chest pain, resolved  Coreg discontinued due to 2nd deg heart block seen on tele  Trop neg, EKG without acute changes  Cont' Norvasc, clonidine, nitrobid. titrating ydralazine  Not on ACEi/ARBs due to TONJA  For stress test today  Cardiology following    TONJA, likely from diuretics and recent contrast use. Avoid nephrotoxic agents. Cr improving     Hypokalemia, repleted    PNA on CXR  CXR showed R pleural effusion with basilar air space disease  probnp elevated   Pt already on IV abx as above  Lasix on hold   Monitor daily lytes, strict I&O    TONJA vs CKD3  Cr trending down, monitor      Code status: Full  Prophylaxis: Hep SQ  Recommended Disposition: SNF     Subjective:     Chief Complaint / Reason for Physician Visit  Pt seen. NAD. Discussed with RN events overnight.      Review of Systems:  Symptom Y/N Comments  Symptom Y/N Comments   Fever/Chills    Chest Pain     Poor Appetite    Edema     Cough    Abdominal Pain     Sputum    Joint Pain     SOB/ALCY    Pruritis/Rash     Nausea/vomit    Tolerating PT/OT     Diarrhea    Tolerating Diet     Constipation    Other Could NOT obtain due to:      Objective:     VITALS:   Last 24hrs VS reviewed since prior progress note. Most recent are:  Patient Vitals for the past 24 hrs:   Temp Pulse Resp BP SpO2   12/08/20 0838    (!) 168/70    12/08/20 0735 97.4 °F (36.3 °C) 95 16 (!) 103/98 99 %   12/08/20 0307 97.3 °F (36.3 °C) 89 18 (!) 173/80 100 %   12/08/20 0026  88      12/08/20 0022    (!) 171/77    12/08/20 0006  86  (!) 181/72 98 %   12/07/20 2012 97.3 °F (36.3 °C) 92 18 (!) 182/85 97 %   12/07/20 1539 97.2 °F (36.2 °C) 91 16 (!) 173/80 98 %       Intake/Output Summary (Last 24 hours) at 12/8/2020 1111  Last data filed at 12/8/2020 7272  Gross per 24 hour   Intake 940 ml   Output 830 ml   Net 110 ml        I had a face to face encounter and independently examined this patient on 12/8/2020, as outlined below:  PHYSICAL EXAM:  General: WD, WN. Alert, cooperative, no acute distress    EENT:  EOMI. Anicteric sclerae. MMM  Resp:  CTA bilaterally, no wheezing or rales. No accessory muscle use  CV:  Regular  rhythm,  No edema  GI:  Soft, Non distended, Non tender. +BS  Neurologic:  Alert and oriented X 3, normal speech  Psych:   Not anxious nor agitated  Skin:  Dry scabs on b/l LE. Black discoloration of L 2nd toe. Ulceration on R dorsal forefoot. Reviewed most current lab test results and cultures  YES  Reviewed most current radiology test results   YES  Review and summation of old records today    NO  Reviewed patient's current orders and MAR    YES  PMH/SH reviewed - no change compared to H&P  ________________________________________________________________________  Care Plan discussed with:    Comments   Patient x    Family  x    RN x    Care Manager     Consultant                        Multidiciplinary team rounds were held today with , nursing, pharmacist and clinical coordinator. Patient's plan of care was discussed; medications were reviewed and discharge planning was addressed. ________________________________________________________________________  Total NON critical care TIME: 35 Minutes    Total CRITICAL CARE TIME Spent:   Minutes non procedure based      Comments   >50% of visit spent in counseling and coordination of care     ________________________________________________________________________  Otoniel Munoz MD     Procedures: see electronic medical records for all procedures/Xrays and details which were not copied into this note but were reviewed prior to creation of Plan. LABS:  I reviewed today's most current labs and imaging studies.   Pertinent labs include:  Recent Labs     12/08/20 0223 12/07/20 0307 12/06/20  0323   WBC 10.1 10.4 10.9   HGB 10.5* 10.2* 10.8*   HCT 33.3* 32.0* 35.5    190 185     Recent Labs     12/08/20 0223 12/07/20 0307 12/06/20  0323    138 137   K 3.9 3.4* 3.7   * 108 108   CO2 23 26 22   * 122* 102*   BUN 28* 36* 39*   CREA 1.05* 1.24* 1.39*   CA 8.4* 8.4* 8.4*       Signed: Otoniel Munoz MD

## 2020-12-08 NOTE — PROGRESS NOTES
End of Shift Note    Bedside shift [facility] by Demetrius Coombs RN (offgoing nurse). Report included the following information SBAR, Kardex, ED Summary, Intake/Output, MAR, Recent Results and Cardiac Rhythm (NSR)    Shift worked:  7a-7p     Shift summary and any significant changes:     Uneventful. Patient refusing to get up in chair throughout shift. Complained of pain twice. Wound care performed. Tolerating diet well. Passed loose BM this shift. Concerns for physician to address:  n/a     Zone phone for oncoming shift:   [phone]       Activity:  Activity Level: Up with Assistance  Number times ambulated in hallways past shift: 0  Number of times OOB to chair past shift: 0    Cardiac:   Cardiac Monitoring: Yes      Cardiac Rhythm: Normal sinus rhythm    Access:   Current line(s): PIV     Genitourinary:   Urinary status: voiding and external catheter    Respiratory:   O2 Device: Room air  Chronic home O2 use?: NO  Incentive spirometer at bedside: NO     GI:  Last Bowel Movement Date: 12/07/20  Current diet:  DIET CARDIAC Regular  DIET NUTRITIONAL SUPPLEMENTS Breakfast, Lunch, Dinner; Ensure Clear  DIET NPO  Passing flatus: YES  Tolerating current diet: YES  % Diet Eaten: 100 %    Pain Management:   Patient states pain is manageable on current regimen: YES    Skin:  Darien Score: 13  Interventions: turn team, speciality bed, float heels, increase time out of bed, foam dressing, PT/OT consult, limit briefs and internal/external urinary devices    Patient Safety:  Fall Score:  Total Score: 4  Interventions: bed/chair alarm, assistive device (walker, cane, etc), gripper socks, pt to call before getting OOB, stay with me (per policy) and gait belt  High Fall Risk: Yes    Length of Stay:  Expected LOS: 4d 2h  Actual LOS: [facility], Kena, RN

## 2020-12-08 NOTE — PROGRESS NOTES
.End of Shift Note    Bedside shift change report given to Beatriz Mar RN (oncoming nurse) by Alexis Fontana (offgoing nurse). Report included the following information SBAR, Kardex, MAR and Recent Results    Shift worked:  7p-7a     Shift summary and any significant changes:     hydralazine given PRN for SBP >160, will continue to monitor BP. Patient continues to experience  loose stools. Wound care was completed as needed   Concerns for physician to address:  none   Zone phone for oncoming shift:   [phone]     Activity:  Activity Level: Up with Assistance    Cardiac:   Cardiac Monitoring:yes    Cardiac Rhythm: Normal sinus rhythm    Access:   Current line(s): 22 L forearm     Genitourinary:   Urinary status: voiding, external catheter    Respiratory:   O2 Device: Room air     GI:  Last Bowel Movement Date: 12/07/20  Current diet:  DIET NUTRITIONAL SUPPLEMENTS Breakfast, Lunch, Dinner; Ensure Clear  DIET NPO  Passing flatus: yes  Tolerating current diet:yes  % Diet Eaten: 100 %    Pain Management:   Patient states pain is manageable on current regimen:n/a    Skin:  Darien Score: 13  Interventions: assess change in LOC, pressure redistribution mattress, float heels, turn and reposition q2h, assess for incontinence     Patient Safety:  Fall Score:  Total Score: 4  Interventions: bed alarm, door open when unattended   High Fall Risk: Yes    Length of Stay:  Expected LOS: 4d 2h  Actual LOS: [address]

## 2020-12-08 NOTE — PROGRESS NOTES
Problem: Falls - Risk of  Goal: *Absence of Falls  Description: Document Maritza Sanchez Fall Risk and appropriate interventions in the flowsheet. 12/7/2020 2109 by Clive Ha  Outcome: Progressing Towards Goal  Note: Fall Risk Interventions:  Mobility Interventions: Bed/chair exit alarm         Medication Interventions: Bed/chair exit alarm, Evaluate medications/consider consulting pharmacy    Elimination Interventions: Bed/chair exit alarm, Call light in reach, Toileting schedule/hourly rounds, Patient to call for help with toileting needs    History of Falls Interventions: Bed/chair exit alarm, Investigate reason for fall, Door open when patient unattended      12/7/2020 0856 by Clive Ha  Outcome: Progressing Towards Goal  Note: Fall Risk Interventions:  Mobility Interventions: Bed/chair exit alarm, Communicate number of staff needed for ambulation/transfer         Medication Interventions: Evaluate medications/consider consulting pharmacy    Elimination Interventions: Call light in reach, Bed/chair exit alarm, Patient to call for help with toileting needs, Toileting schedule/hourly rounds    History of Falls Interventions: Bed/chair exit alarm, Door open when patient unattended         Problem: Pressure Injury - Risk of  Goal: *Prevention of pressure injury  Description: Document Darien Scale and appropriate interventions in the flowsheet. 12/7/2020 2109 by Clive Ha  Outcome: Progressing Towards Goal  Note: Pressure Injury Interventions:  Sensory Interventions: Assess changes in LOC, Maintain/enhance activity level, Pressure redistribution bed/mattress (bed type), Minimize linen layers, Float heels, Monitor skin under medical devices, Keep linens dry and wrinkle-free, Turn and reposition approx.  every two hours (pillows and wedges if needed)    Moisture Interventions: Absorbent underpads, Internal/External urinary devices, Check for incontinence Q2 hours and as needed    Activity Interventions: Pressure redistribution bed/mattress(bed type)    Mobility Interventions: Pressure redistribution bed/mattress (bed type), Turn and reposition approx.  every two hours(pillow and wedges)    Nutrition Interventions: Document food/fluid/supplement intake    Friction and Shear Interventions: Feet elevated on foot rest, Foam dressings/transparent film/skin sealants, HOB 30 degrees or less             12/7/2020 0856 by Deedee Arias  Outcome: Progressing Towards Goal  Note: Pressure Injury Interventions:  Sensory Interventions: Assess changes in LOC, Assess need for specialty bed, Keep linens dry and wrinkle-free, Maintain/enhance activity level, Monitor skin under medical devices    Moisture Interventions: Absorbent underpads, Assess need for specialty bed, Check for incontinence Q2 hours and as needed, Internal/External urinary devices    Activity Interventions: Assess need for specialty bed    Mobility Interventions: Assess need for specialty bed    Nutrition Interventions: Document food/fluid/supplement intake    Friction and Shear Interventions: Apply protective barrier, creams and emollients, Feet elevated on foot rest, Foam dressings/transparent film/skin sealants, Lift sheet, Lift team/patient mobility team, Transferring/repositioning devices                Problem: Pain  Goal: *Control of Pain  12/7/2020 2109 by Deedee Arias  Outcome: Progressing Towards Goal  12/7/2020 0856 by Deedee Arias  Outcome: Progressing Towards Goal     Problem: Hypertension  Goal: *Blood pressure within specified parameters  12/7/2020 2109 by Deedee Arias  Outcome: Progressing Towards Goal  12/7/2020 0856 by Deedee Arias  Outcome: Progressing Towards Goal     Problem: Hypertension  Goal: *Blood pressure within specified parameters  12/7/2020 2109 by Deedee Arias  Outcome: Progressing Towards Goal  12/7/2020 0856 by Deedee Arias  Outcome: Progressing Towards Goal  Goal: *Fluid volume balance  12/7/2020 2109 by Shelia Comas  Outcome: Progressing Towards Goal  12/7/2020 0856 by Shelia Comas  Outcome: Progressing Towards Goal  Goal: *Labs within defined limits  12/7/2020 2109 by Shelia Comas  Outcome: Progressing Towards Goal  12/7/2020 0856 by Shelia Comas  Outcome: Progressing Towards Goal

## 2020-12-08 NOTE — PROGRESS NOTES
[facility]      [address] [place] [address]  [phone]      Cardiology Progress Note      12/8/2020 3:49 PM    Admit Date: 12/1/2020    Admit Diagnosis:   Cellulitis [L03.90]    Subjective:     Carina Ray     No complaints    Visit Vitals  BP (!) 151/68   Pulse 89   Temp 97.6 °F (36.4 °C)   Resp 16   Ht 5' 4\" (1.626 m)   Wt 115 lb (52.2 kg)   SpO2 99%   Breastfeeding No   BMI 19.74 kg/m²       Current Facility-Administered Medications   Medication Dose Route Frequency    sodium chloride (NS) flush 10 mL  10 mL IntraVENous PRN    hydrALAZINE (APRESOLINE) tablet 100 mg  100 mg Oral TID    morphine injection 1 mg  1 mg IntraVENous Q4H PRN    polyethylene glycol (MIRALAX) packet 17 g  17 g Oral DAILY    bisacodyL (DULCOLAX) suppository 10 mg  10 mg Rectal DAILY PRN    cefepime (MAXIPIME) 2 g in 0.9% sodium chloride (MBP/ADV) 100 mL MBP  2 g IntraVENous Q24H    traMADoL (ULTRAM) tablet 50 mg  50 mg Oral Q6H PRN    nitroglycerin (NITROBID) 2 % ointment 1 Inch  1 Inch Topical TID    amLODIPine (NORVASC) tablet 10 mg  10 mg Oral DAILY    pantoprazole (PROTONIX) tablet 40 mg  40 mg Oral ACB    cloNIDine HCL (CATAPRES) tablet 0.2 mg  0.2 mg Oral TID    hydrALAZINE (APRESOLINE) 20 mg/mL injection 10 mg  10 mg IntraVENous Q6H PRN    aspirin delayed-release tablet 81 mg  81 mg Oral DAILY    ALPRAZolam (XANAX) tablet 0.25 mg  0.25 mg Oral BID PRN    mupirocin (BACTROBAN) 2 % ointment   Topical DAILY    acetaminophen (TYLENOL) tablet 650 mg  650 mg Oral Q6H PRN    sodium chloride (NS) flush 5-40 mL  5-40 mL IntraVENous Q8H    sodium chloride (NS) flush 5-40 mL  5-40 mL IntraVENous PRN    polyethylene glycol (MIRALAX) packet 17 g  17 g Oral DAILY PRN    ondansetron (ZOFRAN) injection 4 mg  4 mg IntraVENous Q6H PRN    heparin (porcine) injection 5,000 Units  5,000 Units SubCUTAneous Q12H    potassium chloride (K-DUR, KLOR-CON) SR tablet 20 mEq  20 mEq Oral DAILY Objective:      Physical Exam:  General Appearance:    Chest:   Clear  Cardiovascular: RRR  Extremities: no edema  Skin:  Warm and dry.     Data Review:   Recent Labs     12/08/20 0223 12/07/20  0307 12/06/20  0323   WBC 10.1 10.4 10.9   HGB 10.5* 10.2* 10.8*   HCT 33.3* 32.0* 35.5    190 185     Recent Labs     12/08/20 0223 12/07/20 0307 12/06/20  0323    138 137   K 3.9 3.4* 3.7   * 108 108   CO2 23 26 22   * 122* 102*   BUN 28* 36* 39*   CREA 1.05* 1.24* 1.39*   CA 8.4* 8.4* 8.4*       No results for input(s): TROIQ, CPK, CKMB in the last 72 hours.       Intake/Output Summary (Last 24 hours) at 12/8/2020 1549  Last data filed at 12/8/2020 1202  Gross per 24 hour   Intake 700 ml   Output 830 ml   Net -130 ml        Telemetry:   EKG:  Cxray:    Assessment:     Principal Problem:    Cellulitis (12/1/2020)    Active Problems:    Cardiomyopathy ([facility] [address] (10/5/5230)      Systolic heart failure ([address] (12/3/2020)      HFrEF (heart failure with reduced ejection fraction) ([facility] [address] (12/3/2020)      Hypertension, uncontrolled (12/3/2020)      Mobitz type 2 second degree atrioventricular block (12/3/2020)        Plan:     BP better; Sanjeev Urrutia pending    Anna Severino M.D., [facility] [place]

## 2020-12-09 LAB
ALBUMIN SERPL-MCNC: 2.5 G/DL (ref 3.5–5)
ALBUMIN/GLOB SERPL: 0.5 {RATIO} (ref 1.1–2.2)
ALP SERPL-CCNC: 96 U/L (ref 45–117)
ALT SERPL-CCNC: 23 U/L (ref 12–78)
ANION GAP SERPL CALC-SCNC: 5 MMOL/L (ref 5–15)
AST SERPL-CCNC: 36 U/L (ref 15–37)
BASOPHILS # BLD: 0.1 K/UL (ref 0–0.1)
BASOPHILS NFR BLD: 1 % (ref 0–1)
BILIRUB SERPL-MCNC: 0.8 MG/DL (ref 0.2–1)
BUN SERPL-MCNC: 27 MG/DL (ref 6–20)
BUN/CREAT SERPL: 23 (ref 12–20)
CALCIUM SERPL-MCNC: 8.6 MG/DL (ref 8.5–10.1)
CHLORIDE SERPL-SCNC: 110 MMOL/L (ref 97–108)
CO2 SERPL-SCNC: 24 MMOL/L (ref 21–32)
CREAT SERPL-MCNC: 1.2 MG/DL (ref 0.55–1.02)
DIFFERENTIAL METHOD BLD: ABNORMAL
EOSINOPHIL # BLD: 0.2 K/UL (ref 0–0.4)
EOSINOPHIL NFR BLD: 2 % (ref 0–7)
ERYTHROCYTE [DISTWIDTH] IN BLOOD BY AUTOMATED COUNT: 15.4 % (ref 11.5–14.5)
GLOBULIN SER CALC-MCNC: 4.6 G/DL (ref 2–4)
GLUCOSE SERPL-MCNC: 93 MG/DL (ref 65–100)
HCT VFR BLD AUTO: 30.3 % (ref 35–47)
HEALTH STATUS, XMCV2T: NORMAL
HGB BLD-MCNC: 9.5 G/DL (ref 11.5–16)
IMM GRANULOCYTES # BLD AUTO: 0 K/UL (ref 0–0.04)
IMM GRANULOCYTES NFR BLD AUTO: 0 % (ref 0–0.5)
LYMPHOCYTES # BLD: 1.2 K/UL (ref 0.8–3.5)
LYMPHOCYTES NFR BLD: 16 % (ref 12–49)
MCH RBC QN AUTO: 28.7 PG (ref 26–34)
MCHC RBC AUTO-ENTMCNC: 31.4 G/DL (ref 30–36.5)
MCV RBC AUTO: 91.5 FL (ref 80–99)
MONOCYTES # BLD: 1 K/UL (ref 0–1)
MONOCYTES NFR BLD: 12 % (ref 5–13)
NEUTS SEG # BLD: 5.5 K/UL (ref 1.8–8)
NEUTS SEG NFR BLD: 69 % (ref 32–75)
NRBC # BLD: 0 K/UL (ref 0–0.01)
NRBC BLD-RTO: 0 PER 100 WBC
PLATELET # BLD AUTO: 180 K/UL (ref 150–400)
POTASSIUM SERPL-SCNC: 3.9 MMOL/L (ref 3.5–5.1)
PROT SERPL-MCNC: 7.1 G/DL (ref 6.4–8.2)
RBC # BLD AUTO: 3.31 M/UL (ref 3.8–5.2)
SARS-COV-2, COV2: NOT DETECTED
SODIUM SERPL-SCNC: 139 MMOL/L (ref 136–145)
SOURCE, COVRS: NORMAL
SPECIMEN SOURCE, FCOV2M: NORMAL
SPECIMEN TYPE, XMCV1T: NORMAL
WBC # BLD AUTO: 8 K/UL (ref 3.6–11)

## 2020-12-09 PROCEDURE — 74011250636 HC RX REV CODE- 250/636: Performed by: INTERNAL MEDICINE

## 2020-12-09 PROCEDURE — 94760 N-INVAS EAR/PLS OXIMETRY 1: CPT

## 2020-12-09 PROCEDURE — 80053 COMPREHEN METABOLIC PANEL: CPT

## 2020-12-09 PROCEDURE — 36415 COLL VENOUS BLD VENIPUNCTURE: CPT

## 2020-12-09 PROCEDURE — 85025 COMPLETE CBC W/AUTO DIFF WBC: CPT

## 2020-12-09 PROCEDURE — 74011250637 HC RX REV CODE- 250/637: Performed by: NURSE PRACTITIONER

## 2020-12-09 PROCEDURE — 74011250637 HC RX REV CODE- 250/637: Performed by: INTERNAL MEDICINE

## 2020-12-09 PROCEDURE — 65660000000 HC RM CCU STEPDOWN

## 2020-12-09 PROCEDURE — 74011000258 HC RX REV CODE- 258: Performed by: INTERNAL MEDICINE

## 2020-12-09 PROCEDURE — 74011250637 HC RX REV CODE- 250/637: Performed by: EMERGENCY MEDICINE

## 2020-12-09 RX ADMIN — HYDRALAZINE HYDROCHLORIDE 100 MG: 50 TABLET, FILM COATED ORAL at 21:50

## 2020-12-09 RX ADMIN — NITROGLYCERIN 1 INCH: 20 OINTMENT TOPICAL at 21:51

## 2020-12-09 RX ADMIN — CLONIDINE HYDROCHLORIDE 0.2 MG: 0.1 TABLET ORAL at 10:15

## 2020-12-09 RX ADMIN — NITROGLYCERIN 1 INCH: 20 OINTMENT TOPICAL at 10:16

## 2020-12-09 RX ADMIN — CEFEPIME HYDROCHLORIDE 2 G: 2 INJECTION, POWDER, FOR SOLUTION INTRAVENOUS at 06:34

## 2020-12-09 RX ADMIN — PANTOPRAZOLE SODIUM 40 MG: 40 TABLET, DELAYED RELEASE ORAL at 06:38

## 2020-12-09 RX ADMIN — TRAMADOL HYDROCHLORIDE 50 MG: 50 TABLET, COATED ORAL at 23:39

## 2020-12-09 RX ADMIN — CLONIDINE HYDROCHLORIDE 0.2 MG: 0.1 TABLET ORAL at 21:50

## 2020-12-09 RX ADMIN — ASPIRIN 81 MG: 81 TABLET, COATED ORAL at 10:15

## 2020-12-09 RX ADMIN — ACETAMINOPHEN 650 MG: 325 TABLET ORAL at 20:09

## 2020-12-09 RX ADMIN — POTASSIUM CHLORIDE 20 MEQ: 20 TABLET, EXTENDED RELEASE ORAL at 10:15

## 2020-12-09 RX ADMIN — HEPARIN SODIUM 5000 UNITS: 5000 INJECTION INTRAVENOUS; SUBCUTANEOUS at 10:15

## 2020-12-09 RX ADMIN — Medication 10 ML: at 13:35

## 2020-12-09 RX ADMIN — HEPARIN SODIUM 5000 UNITS: 5000 INJECTION INTRAVENOUS; SUBCUTANEOUS at 21:50

## 2020-12-09 RX ADMIN — Medication 10 ML: at 06:29

## 2020-12-09 RX ADMIN — NITROGLYCERIN 1 INCH: 20 OINTMENT TOPICAL at 16:27

## 2020-12-09 RX ADMIN — Medication 10 ML: at 21:54

## 2020-12-09 RX ADMIN — TRAMADOL HYDROCHLORIDE 50 MG: 50 TABLET, COATED ORAL at 03:29

## 2020-12-09 RX ADMIN — HYDRALAZINE HYDROCHLORIDE 100 MG: 50 TABLET, FILM COATED ORAL at 10:15

## 2020-12-09 RX ADMIN — MUPIROCIN: 20 OINTMENT TOPICAL at 10:17

## 2020-12-09 RX ADMIN — CLONIDINE HYDROCHLORIDE 0.2 MG: 0.1 TABLET ORAL at 16:27

## 2020-12-09 RX ADMIN — HYDRALAZINE HYDROCHLORIDE 100 MG: 50 TABLET, FILM COATED ORAL at 16:27

## 2020-12-09 RX ADMIN — AMLODIPINE BESYLATE 10 MG: 5 TABLET ORAL at 10:15

## 2020-12-09 NOTE — PROGRESS NOTES
Plan:  -[facility]. for rehab  -BLS transport       3:06PM  Per attending, plan for d/c tomorrow. CM confirmed bed availability with Clarisse Spaulding at [facility]. CM PC to pt's Georges cordoba. Shy agreeable to d/c plan for tomorrow. Medicare appeal rights discussed. No questions or concerns at this time. Niece stating pt has not applied for Medicaid before but would benefit from screening. Referral sent to [facility]. [facility] transport arranged for 10AM. Luz aware and will update pt's sons on d/c plan. CM will continue to follow and assist with d/c planning.        Ailyn Hernandez, MSW  Care Manager

## 2020-12-09 NOTE — PROGRESS NOTES
End of Shift Note    Bedside shift change report given to Colt DIAZ (oncoming nurse) by Franklin Mcdonough (offgoing nurse). Report included the following information SBAR, Kardex, Intake/Output, MAR and Recent Results    Shift worked:  7p-7a     Shift summary and any significant changes:     Pt had uneventful shift. Two bouts of urinary/fecal incontinence. Dressings CDI. Received tramadol for pain this AM.   Concerns for physician to address:  Pt is having loose BM's, may not need miralax scheduled. Zone phone for oncoming shift:   [phone]       Activity:  Activity Level: Bed Rest  Number times ambulated in hallways past shift: 0  Number of times OOB to chair past shift: 0    Cardiac:   Cardiac Monitoring: Yes      Cardiac Rhythm: Normal sinus rhythm    Access:   Current line(s): PIV     Genitourinary:   Urinary status: voiding    Respiratory:   O2 Device: Room air  Chronic home O2 use?: NO  Incentive spirometer at bedside: NO     GI:  Last Bowel Movement Date: 12/08/20  Current diet:  DIET NUTRITIONAL SUPPLEMENTS Breakfast, Lunch, Dinner; Ensure Clear  DIET CLEAR LIQUID  Passing flatus: YES  Tolerating current diet: YES  % Diet Eaten: 0 %    Pain Management:   Patient states pain is manageable on current regimen: YES    Skin:  Darien Score: 17  Interventions: turn team, float heels, increase time out of bed and limit briefs    Patient Safety:  Fall Score:  Total Score: 4  Interventions: bed/chair alarm, assistive device (walker, cane, etc), gripper socks and pt to call before getting OOB  High Fall Risk: Yes    Length of Stay:  Expected LOS: 4d 2h  Actual LOS: 8      Lauraine Cipro

## 2020-12-09 NOTE — PROGRESS NOTES
Hospitalist Progress Note    NAME: William Norris   :  1945   MRN:  [id number]       Assessment / Plan:  LE cellulitis  MRI L foot neg for any fracture. No OM. Nonspecific diffuse subcutaneous edema. 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 R resting CARISSA is nl. R TBI 1.01.  L resting CARISSA is normal, L TIB 1.01  A1C 5.9  Procalcitonin <0.05, wound cx showed staph aureus and heavy diptheroids  Will cont' empiric IV abx: cefepime to copmlete 7 days total.  Stop vanc and flagyl  Tramadol prn  Podiatry and wound care following, appreciate recs   Up in chair, out of bed   PT/OT, pt shuffles with assistance at baseline for ~1 yr.  Mainly bedbound   Called pt's niece Howard Officer and was able to give updates. Pt's son did not answer his phone. Anticipation of discharge tomorrow if pt remains stable    New onset of cardiomyopathy, EF 35-40%  HTN  2nd deg AV block, resolved  Chest pain, resolved  Coreg discontinued due to 2nd deg heart block seen on tele  Trop neg, EKG without acute changes  Cont' Norvasc, clonidine, nitrobid and hydralazine  Not on ACEi/ARBs due to TONJA  Stress test with no ischemia and or infarction/  EF =52%. Cardiology following, discussed with Dr Timmy Morales      TONJA, likely from diuretics and recent contrast use. Avoid nephrotoxic agents. Cr improving     Hypokalemia, repleted    PNA on CXR  CXR showed R pleural effusion with basilar air space disease  probnp elevated   Pt already on IV abx as above  Lasix on hold   Monitor daily lytes, strict I&O    TONJA vs CKD3  Cr trending down, monitor      Code status: Full  Prophylaxis: Hep SQ  Recommended Disposition: SNF     Subjective:     Chief Complaint / Reason for Physician Visit  Pt seen. No new complaints. Discussed with RN events overnight.      Review of Systems:  Symptom Y/N Comments  Symptom Y/N Comments   Fever/Chills    Chest Pain     Poor Appetite    Edema     Cough Abdominal Pain     Sputum    Joint Pain     SOB/LACY    Pruritis/Rash     Nausea/vomit    Tolerating PT/OT     Diarrhea    Tolerating Diet     Constipation    Other       Could NOT obtain due to:      Objective:     VITALS:   Last 24hrs VS reviewed since prior progress note. Most recent are:  Patient Vitals for the past 24 hrs:   Temp Pulse Resp BP SpO2   12/09/20 0753 97.9 °F (36.6 °C) 89 16 (!) 156/63 98 %   12/09/20 0400 97.6 °F (36.4 °C) 92 16 (!) 153/71 97 %   12/08/20 2358 97.9 °F (36.6 °C) 86 17 (!) 159/76 100 %   12/08/20 2213  91  (!) 145/71    12/08/20 2016 97.3 °F (36.3 °C) 90 16 (!) 149/65 98 %   12/08/20 1457 97.6 °F (36.4 °C) 89 16 (!) 151/68 99 %   12/08/20 1118 97.2 °F (36.2 °C) 95 15 (!) 135/55 99 %       Intake/Output Summary (Last 24 hours) at 12/9/2020 1040  Last data filed at 12/9/2020 0343  Gross per 24 hour   Intake 400 ml   Output 201 ml   Net 199 ml        I had a face to face encounter and independently examined this patient on 12/9/2020, as outlined below:  PHYSICAL EXAM:  General: WD, WN. Alert, cooperative, no acute distress    EENT:  EOMI. Anicteric sclerae. MMM  Resp:  CTA bilaterally, no wheezing or rales. No accessory muscle use  CV:  Regular  rhythm,  No edema  GI:  Soft, Non distended, Non tender. +BS  Neurologic:  Alert and oriented X 3, normal speech  Psych:   Not anxious nor agitated  Skin:  Dry scabs on b/l LE. Black discoloration of L 2nd toe. Ulceration on R dorsal forefoot.     Reviewed most current lab test results and cultures  YES  Reviewed most current radiology test results   YES  Review and summation of old records today    NO  Reviewed patient's current orders and MAR    YES  PMH/SH reviewed - no change compared to H&P  ________________________________________________________________________  Care Plan discussed with:    Comments   Patient x    Family      RN x    Care Manager     Consultant  x                      Multidiciplinary team rounds were held today with , nursing, pharmacist and clinical coordinator. Patient's plan of care was discussed; medications were reviewed and discharge planning was addressed. ________________________________________________________________________  Total NON critical care TIME: 35 Minutes    Total CRITICAL CARE TIME Spent:   Minutes non procedure based      Comments   >50% of visit spent in counseling and coordination of care     ________________________________________________________________________  Alcides Ramos MD     Procedures: see electronic medical records for all procedures/Xrays and details which were not copied into this note but were reviewed prior to creation of Plan. LABS:  I reviewed today's most current labs and imaging studies.   Pertinent labs include:  Recent Labs     12/09/20  0200 12/08/20 0223 12/07/20  0307   WBC 8.0 10.1 10.4   HGB 9.5* 10.5* 10.2*   HCT 30.3* 33.3* 32.0*    189 190     Recent Labs     12/09/20  0348 12/08/20 0223 12/07/20  0307    138 138   K 3.9 3.9 3.4*   * 110* 108   CO2 24 23 26   GLU 93 106* 122*   BUN 27* 28* 36*   CREA 1.20* 1.05* 1.24*   CA 8.6 8.4* 8.4*   ALB 2.5*  --   --    TBILI 0.8  --   --    ALT 23  --   --        Signed: Alcides Ramos MD

## 2020-12-09 NOTE — PROGRESS NOTES
End of Shift Note    Bedside shift change report given to OLINDA Will (oncoming nurse) by Evi Ramos RN (offgoing nurse). Report included the following information SBAR, Kardex, ED Summary, Intake/Output, MAR, Recent Results and Cardiac Rhythm (NSR)    Shift worked:  7a-7p     Shift summary and any significant changes:     Uneventful. Patient tolerating diet well. One incont episode. Wound care performed. No pain meds needed this shift. Concerns for physician to address:  n/a     Zone phone for oncoming shift:   [phone]       Activity:  Activity Level: Bed Rest  Number times ambulated in hallways past shift: 0  Number of times OOB to chair past shift: 0    Cardiac:   Cardiac Monitoring: Yes      Cardiac Rhythm: Normal sinus rhythm    Access:   Current line(s): PIV     Genitourinary:   Urinary status: voiding and incontinent    Respiratory:   O2 Device: Room air  Chronic home O2 use?: NO  Incentive spirometer at bedside: NO     GI:  Last Bowel Movement Date: 12/09/20  Current diet:  DIET NUTRITIONAL SUPPLEMENTS Breakfast, Lunch, Dinner; Ensure Clear  DIET CARDIAC Regular  Passing flatus: YES  Tolerating current diet: YES  % Diet Eaten: 40 %    Pain Management:   Patient states pain is manageable on current regimen: YES    Skin:  Darien Score: 17  Interventions: turn team, speciality bed, float heels, increase time out of bed, foam dressing, PT/OT consult, limit briefs and internal/external urinary devices    Patient Safety:  Fall Score:  Total Score: 4  Interventions: bed/chair alarm, assistive device (walker, cane, etc), gripper socks, pt to call before getting OOB and stay with me (per policy)  High Fall Risk: Yes    Length of Stay:  Expected LOS: 4d 2h  Actual LOS: 181 Cheri Barney RN

## 2020-12-09 NOTE — PROGRESS NOTES
Problem: Falls - Risk of  Goal: *Absence of Falls  Description: Document Victor Hugo Hutchison Fall Risk and appropriate interventions in the flowsheet. Outcome: Progressing Towards Goal  Note: Fall Risk Interventions:  Mobility Interventions: Bed/chair exit alarm, Communicate number of staff needed for ambulation/transfer, Patient to call before getting OOB         Medication Interventions: Bed/chair exit alarm, Patient to call before getting OOB    Elimination Interventions: Bed/chair exit alarm, Patient to call for help with toileting needs, Call light in reach    History of Falls Interventions: Bed/chair exit alarm, Door open when patient unattended         Problem: Patient Education: Go to Patient Education Activity  Goal: Patient/Family Education  Outcome: Progressing Towards Goal     Problem: Pressure Injury - Risk of  Goal: *Prevention of pressure injury  Description: Document Darien Scale and appropriate interventions in the flowsheet.   Outcome: Progressing Towards Goal  Note: Pressure Injury Interventions:  Sensory Interventions: Assess changes in LOC    Moisture Interventions: Absorbent underpads    Activity Interventions: Increase time out of bed    Mobility Interventions: HOB 30 degrees or less    Nutrition Interventions: Document food/fluid/supplement intake    Friction and Shear Interventions: Feet elevated on foot rest, HOB 30 degrees or less, Minimize layers                Problem: Patient Education: Go to Patient Education Activity  Goal: Patient/Family Education  Outcome: Progressing Towards Goal     Problem: Pain  Goal: *Control of Pain  Outcome: Progressing Towards Goal     Problem: Patient Education: Go to Patient Education Activity  Goal: Patient/Family Education  Outcome: Progressing Towards Goal

## 2020-12-09 NOTE — PROGRESS NOTES
Attempted to see pt this pm and as we walked in pt informed us she was soiled (nurse notified). Continue to follow.

## 2020-12-10 VITALS
SYSTOLIC BLOOD PRESSURE: 155 MMHG | HEIGHT: 64 IN | BODY MASS INDEX: 19.63 KG/M2 | HEART RATE: 86 BPM | OXYGEN SATURATION: 100 % | RESPIRATION RATE: 15 BRPM | DIASTOLIC BLOOD PRESSURE: 69 MMHG | TEMPERATURE: 98.6 F | WEIGHT: 115 LBS

## 2020-12-10 LAB
ANION GAP SERPL CALC-SCNC: 8 MMOL/L (ref 5–15)
BASOPHILS # BLD: 0 K/UL (ref 0–0.1)
BASOPHILS NFR BLD: 1 % (ref 0–1)
BUN SERPL-MCNC: 25 MG/DL (ref 6–20)
BUN/CREAT SERPL: 19 (ref 12–20)
CALCIUM SERPL-MCNC: 8.6 MG/DL (ref 8.5–10.1)
CHLORIDE SERPL-SCNC: 108 MMOL/L (ref 97–108)
CO2 SERPL-SCNC: 24 MMOL/L (ref 21–32)
CREAT SERPL-MCNC: 1.31 MG/DL (ref 0.55–1.02)
DIFFERENTIAL METHOD BLD: ABNORMAL
EOSINOPHIL # BLD: 0.2 K/UL (ref 0–0.4)
EOSINOPHIL NFR BLD: 3 % (ref 0–7)
ERYTHROCYTE [DISTWIDTH] IN BLOOD BY AUTOMATED COUNT: 15.2 % (ref 11.5–14.5)
GLUCOSE SERPL-MCNC: 96 MG/DL (ref 65–100)
HCT VFR BLD AUTO: 33.9 % (ref 35–47)
HGB BLD-MCNC: 10.7 G/DL (ref 11.5–16)
IMM GRANULOCYTES # BLD AUTO: 0.1 K/UL (ref 0–0.04)
IMM GRANULOCYTES NFR BLD AUTO: 1 % (ref 0–0.5)
LYMPHOCYTES # BLD: 1.6 K/UL (ref 0.8–3.5)
LYMPHOCYTES NFR BLD: 22 % (ref 12–49)
MCH RBC QN AUTO: 28.3 PG (ref 26–34)
MCHC RBC AUTO-ENTMCNC: 31.6 G/DL (ref 30–36.5)
MCV RBC AUTO: 89.7 FL (ref 80–99)
MONOCYTES # BLD: 1 K/UL (ref 0–1)
MONOCYTES NFR BLD: 14 % (ref 5–13)
NEUTS SEG # BLD: 4.4 K/UL (ref 1.8–8)
NEUTS SEG NFR BLD: 59 % (ref 32–75)
NRBC # BLD: 0 K/UL (ref 0–0.01)
NRBC BLD-RTO: 0 PER 100 WBC
PLATELET # BLD AUTO: 213 K/UL (ref 150–400)
PMV BLD AUTO: 13 FL (ref 8.9–12.9)
POTASSIUM SERPL-SCNC: 3.9 MMOL/L (ref 3.5–5.1)
RBC # BLD AUTO: 3.78 M/UL (ref 3.8–5.2)
SODIUM SERPL-SCNC: 140 MMOL/L (ref 136–145)
WBC # BLD AUTO: 7.4 K/UL (ref 3.6–11)

## 2020-12-10 PROCEDURE — 36415 COLL VENOUS BLD VENIPUNCTURE: CPT

## 2020-12-10 PROCEDURE — 85025 COMPLETE CBC W/AUTO DIFF WBC: CPT

## 2020-12-10 PROCEDURE — 74011250637 HC RX REV CODE- 250/637: Performed by: INTERNAL MEDICINE

## 2020-12-10 PROCEDURE — 74011000258 HC RX REV CODE- 258: Performed by: INTERNAL MEDICINE

## 2020-12-10 PROCEDURE — 80048 BASIC METABOLIC PNL TOTAL CA: CPT

## 2020-12-10 PROCEDURE — 74011250636 HC RX REV CODE- 250/636: Performed by: INTERNAL MEDICINE

## 2020-12-10 PROCEDURE — 74011250637 HC RX REV CODE- 250/637: Performed by: NURSE PRACTITIONER

## 2020-12-10 RX ORDER — AMLODIPINE BESYLATE 10 MG/1
10 TABLET ORAL DAILY
Qty: 30 TAB | Refills: 0 | Status: SHIPPED
Start: 2020-12-10 | End: 2021-01-28 | Stop reason: SDUPTHER

## 2020-12-10 RX ORDER — PANTOPRAZOLE SODIUM 40 MG/1
40 TABLET, DELAYED RELEASE ORAL
Qty: 30 TAB | Refills: 0 | Status: SHIPPED
Start: 2020-12-11 | End: 2021-02-25

## 2020-12-10 RX ORDER — CLONIDINE HYDROCHLORIDE 0.2 MG/1
0.2 TABLET ORAL 3 TIMES DAILY
Qty: 90 TAB | Refills: 0 | Status: SHIPPED
Start: 2020-12-10 | End: 2021-01-28 | Stop reason: SDUPTHER

## 2020-12-10 RX ORDER — ASPIRIN 81 MG/1
81 TABLET ORAL DAILY
Qty: 30 TAB | Refills: 0 | Status: SHIPPED
Start: 2020-12-10 | End: 2021-01-28 | Stop reason: SDUPTHER

## 2020-12-10 RX ADMIN — MUPIROCIN: 20 OINTMENT TOPICAL at 08:35

## 2020-12-10 RX ADMIN — NITROGLYCERIN 1 INCH: 20 OINTMENT TOPICAL at 08:24

## 2020-12-10 RX ADMIN — HYDRALAZINE HYDROCHLORIDE 100 MG: 50 TABLET, FILM COATED ORAL at 08:23

## 2020-12-10 RX ADMIN — POTASSIUM CHLORIDE 20 MEQ: 20 TABLET, EXTENDED RELEASE ORAL at 08:23

## 2020-12-10 RX ADMIN — Medication 10 ML: at 06:40

## 2020-12-10 RX ADMIN — AMLODIPINE BESYLATE 10 MG: 5 TABLET ORAL at 08:23

## 2020-12-10 RX ADMIN — ASPIRIN 81 MG: 81 TABLET, COATED ORAL at 08:23

## 2020-12-10 RX ADMIN — CEFEPIME HYDROCHLORIDE 2 G: 2 INJECTION, POWDER, FOR SOLUTION INTRAVENOUS at 06:33

## 2020-12-10 RX ADMIN — PANTOPRAZOLE SODIUM 40 MG: 40 TABLET, DELAYED RELEASE ORAL at 06:33

## 2020-12-10 RX ADMIN — HEPARIN SODIUM 5000 UNITS: 5000 INJECTION INTRAVENOUS; SUBCUTANEOUS at 08:23

## 2020-12-10 RX ADMIN — CLONIDINE HYDROCHLORIDE 0.2 MG: 0.1 TABLET ORAL at 08:23

## 2020-12-10 NOTE — PROGRESS NOTES
Plan:  -[facility] for rehab  -BLS tranport  -MedAssist screening      LATE NOTE POST-D/C:  Pt d/c to [facility] today. Transported by [facility] at [address]. Family notified yesterday of d/c. SNF note completed below. No hard scripts at d/c. 2nd IM notice provided to pt and explained to niece, Nuria Unger, by phone. MedAssist screening pending. No further CM needs identified prior to d/c.       9:18AM  Full d/c note to follow. Pt d/c to [facility] at 10AM by [facility]. PCS on chart. Call report [phone]. Pt, family, facility, and nursing aware of d/c plan. All in agreement. Transition of Care Plan to SNF/Rehab    SNF/Rehab Transition:  Patient has been accepted to [facility] and meets criteria for admission. Patient will be transported by [facility] and expected to leave at 10AM.    Communication to Patient/Family:  Met with patient and niece Nuira Unger and they are agreeable to the transition plan. Communication to SNF/Rehab:  Bedside RN, Felice Matt, has been notified to update the transition plan to the facility and call report (phone number [phone]). Discharge information has been updated on the AVS.     Discharge instructions to be fax'd to facility at Health system #). Nursing Please include all hard scripts for controlled substances, med rec and dc summary, and AVS in packet.      Reviewed and confirmed with facility, Kristal Arias in admissions, can manage the patient care needs for the following:     [place] Due with (X) only those applicable:    Medication:  [x]  Medications will be available at the facility  []  IV Antibiotics  []  Controlled Substance - hard copy to be sent with patient   []  Weekly Labs   Documents:  [] Hard RX  [] MAR  [] Kardex  [x] AVS  []Transfer Summary  []Discharge   Equipment:  []  CPAP/BiPAP  []  Wound Vacuum  []  [place] or Urinary Device  []  PICC/Central Line  []  Nebulizer  []  Ventilator   Treatment:  []Isolation (for MRSA, VRE, etc.)  []Surgical Drain Management  []Tracheostomy Care  [x]Dressing Changes  []Dialysis with transportation and chair time   []PEG Care  []Oxygen  []Daily Weights for Heart Failure   Dietary:  []Any diet limitations  []Tube Feedings   []Total Parenteral Management (TPN)   Eligible for Medicaid Long Term Services and Supports  Yes:  [] Eligible for medical assistance or will become eligible within 180 days and UAI completed. [] Provider/Patient and/or support system has requested screening. [] UAI copy provided to patient or responsible party  [x] UAI unavailable at discharge will send once processed to SNF provider. [] UAI unavailable at discharged mailed to patient  No:   [] Private pay and is not financially eligible for Medicaid within the next 180 days. [] Reside out-of-state.   [] A residents of a state owned/operated facility that is licensed  by [address] [employer] [facility] or [facility]  [] Enrollment in [facility] hospice services  [] [address] [place] [address] [facility]  [] Patient /Family declines to have screening completed or provide financial information for screening     Financial Resources:  Medicaid    [] Initiated and application pending   [] Full coverage     Advanced Care Plan:  []Surrogate Decision Maker of Care  []POA  []Communicated Code Status    Other    MedAssist screening pending  Dressing changes           STEPHEN Pearson  Care Manager

## 2020-12-10 NOTE — PROGRESS NOTES
I have reviewed discharge instructions with the patient and caregiver. The patient and caregiver verbalized understanding. This nurse gave verbal report via telephone to Hanna at [facility]. Report included the following information SBAR, Kardex, Intake/Output, MAR and Recent Results. Peripheral IVs removed, personal belongings gathered, and pt changed into home clothes. Pt left via stretcher with [facility].

## 2020-12-10 NOTE — PROGRESS NOTES
Problem: Falls - Risk of  Goal: *Absence of Falls  Description: Document Mark Mendoza Fall Risk and appropriate interventions in the flowsheet. Outcome: Progressing Towards Goal  Note: Fall Risk Interventions:  Mobility Interventions: Bed/chair exit alarm, Communicate number of staff needed for ambulation/transfer, Mechanical lift, Patient to call before getting OOB         Medication Interventions: Patient to call before getting OOB, Teach patient to arise slowly, Evaluate medications/consider consulting pharmacy    Elimination Interventions: Bed/chair exit alarm, Call light in reach, Patient to call for help with toileting needs    History of Falls Interventions: Bed/chair exit alarm, Door open when patient unattended, Room close to nurse's station         Problem: Patient Education: Go to Patient Education Activity  Goal: Patient/Family Education  Outcome: Progressing Towards Goal     Problem: Pressure Injury - Risk of  Goal: *Prevention of pressure injury  Description: Document Darien Scale and appropriate interventions in the flowsheet.   Outcome: Progressing Towards Goal  Note: Pressure Injury Interventions:  Sensory Interventions: Assess changes in LOC, Minimize linen layers, Keep linens dry and wrinkle-free, Float heels    Moisture Interventions: Absorbent underpads, Apply protective barrier, creams and emollients    Activity Interventions: Increase time out of bed    Mobility Interventions: HOB 30 degrees or less, Float heels, Pressure redistribution bed/mattress (bed type)    Nutrition Interventions: Document food/fluid/supplement intake    Friction and Shear Interventions: Apply protective barrier, creams and emollients, Feet elevated on foot rest, HOB 30 degrees or less, Lift sheet, Minimize layers                Problem: Patient Education: Go to Patient Education Activity  Goal: Patient/Family Education  Outcome: Progressing Towards Goal     Problem: Pain  Goal: *Control of Pain  Outcome: Progressing Towards Goal     Problem: Patient Education: Go to Patient Education Activity  Goal: Patient/Family Education  Outcome: Progressing Towards Goal

## 2020-12-10 NOTE — PROGRESS NOTES
Problem: Falls - Risk of  Goal: *Absence of Falls  Description: Document Kathy Greenberg Fall Risk and appropriate interventions in the flowsheet. Outcome: Resolved/Met     Problem: Patient Education: Go to Patient Education Activity  Goal: Patient/Family Education  Outcome: Resolved/Met     Problem: Pressure Injury - Risk of  Goal: *Prevention of pressure injury  Description: Document Darien Scale and appropriate interventions in the flowsheet.   Outcome: Resolved/Met     Problem: Patient Education: Go to Patient Education Activity  Goal: Patient/Family Education  Outcome: Resolved/Met     Problem: Pain  Goal: *Control of Pain  Outcome: Resolved/Met     Problem: Patient Education: Go to Patient Education Activity  Goal: Patient/Family Education  Outcome: Resolved/Met     Problem: Hypertension  Goal: *Blood pressure within specified parameters  Outcome: Resolved/Met  Goal: *Fluid volume balance  Outcome: Resolved/Met  Goal: *Labs within defined limits  Outcome: Resolved/Met     Problem: Patient Education: Go to Patient Education Activity  Goal: Patient/Family Education  Outcome: Resolved/Met     Problem: Patient Education: Go to Patient Education Activity  Goal: Patient/Family Education  Outcome: Resolved/Met     Problem: Patient Education: Go to Patient Education Activity  Goal: Patient/Family Education  Outcome: Resolved/Met

## 2020-12-10 NOTE — DISCHARGE SUMMARY
Discharge Summary      Name: Violet Costa  [id number]  YOB: 1945 (Age: 66 y.o.)   Date of Admission: 12/1/2020  Date of Discharge: 12/10/2020  Attending Physician: No att. providers found    Discharge Diagnosis:   New onset of cardiomyopathy, echo with EF 35-40%  HTN  2nd deg AV block, resolved  Chest pain, resolved  LE cellulitis  TONJA  Hypokalemia   PNA on CXR    Consultations:  IP CONSULT TO PODIATRY  IP CONSULT TO CARDIOLOGY    Brief Admission History/Reason for Admission Per Kaylee Mosher DO:   Violet Costa is a 66 y.o. is pleasant -American female with no known past medical history aside from recent bilateral lower extremity cellulitis treated [facility] several weeks ago, who presents to the ED today with bilateral lower extremity swelling, and an ulcer with purulent drainage from her right foot. She was initially seen at an urgent care facility and was then referred to the ED. She does have pain in left foot near the site of a draining ulcer on the dorsum of her forefoot. She reports she was in the hospital for several days at [facility] a few weeks ago during which time she was treated with IV antibiotics which greatly improved bilateral lower extremity cellulitis she was experiencing at the time. However, patient notes that over the last few weeks swelling in her legs has recurred with a draining wound in the right foot and skin breakdown on both extremities. Her systolic blood pressure is greater than 240 on presentation. BP improved but still elevated at 171/105 after IV labetalol in the ED     X-ray of the left foot in the ED reveals nondisplaced fracture of the left second proximal phalanx distally with no plain film evidence for osteomyelitis. Moderate soft tissue swelling compatible with cellulitis is noted.         Patient denies fever/chills or nausea/vomiting.     The patient lives at home with her 2 sons and is independent in her ADLs. She is retired as a  at [employer].     We were asked to admit for work up and evaluation of the above problems. Brief Hospital Course by Main Problems:   LE cellulitis  MRI L foot neg for any fracture. No OM. Nonspecific diffuse subcutaneous edema. 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 R resting CARISSA is nl. R TBI 1.01.  L resting CARISSA is normal, L TIB 1.01  A1C 5.9  Procalcitonin <0.05, wound cx showed staph aureus and heavy diptheroids  Pt completed IV abx inpt. Podiatry and wound care evaluated pt during her hospital stay. outpt f/u with them. New onset of cardiomyopathy, echo with EF 35-40%  HTN  2nd deg AV block, resolved  Chest pain, resolved  Coreg discontinued due to 2nd deg heart block seen on tele  Trop neg, EKG without acute changes  Cont' Norvasc, clonidine, nitrobid and hydralazine  Not on ACEi/ARBs due to TONJA  Stress test with no ischemia and or infarction,  EF =52%. Cardiology following, discussed with Dr Marie Dixon, likely from diuretics and recent contrast use. Avoid nephrotoxic agents. cr improved.     Hypokalemia, repleted     PNA on CXR  CXR showed R pleural effusion with basilar air space disease  probnp elevated. Completed IV abx inpt.          Discharge Exam:  Patient seen and examined by me on discharge day. Pertinent Findings:  Visit Vitals  BP (!) 155/69   Pulse 86   Temp 98.6 °F (37 °C)   Resp 15   Ht 5' 4\" (1.626 m)   Wt 52.2 kg (115 lb)   SpO2 100%   Breastfeeding No   BMI 19.74 kg/m²     Gen:    Not in distress  Chest: Clear lungs  CVS:   Regular rhythm.   No edema  Abd:  Soft, not distended, not tender    Discharge/Recent Laboratory Results:  Recent Labs     12/10/20  0330      K 3.9      CO2 24   BUN 25*   GLU 96   CA 8.6     Recent Labs     12/10/20  0330   HGB 10.7*   HCT 33.9*   WBC 7.4          Discharge Medications:  Discharge Medication List as of 12/10/2020 10:01 AM      START taking these medications    Details   amLODIPine (NORVASC) 10 mg tablet Take 1 Tab by mouth daily. , No Print, Disp-30 Tab,R-0      aspirin delayed-release 81 mg tablet Take 1 Tab by mouth daily. , No Print, Disp-30 Tab,R-0      cloNIDine HCL (CATAPRES) 0.2 mg tablet Take 1 Tab by mouth three (3) times daily. , No Print, Disp-90 Tab,R-0      nitroglycerin (NITROBID) 2 % ointment Apply 1 Inch to affected area three (3) times daily. , No Print, Disp-60 g,R-0      pantoprazole (PROTONIX) 40 mg tablet Take 1 Tab by mouth Daily (before breakfast). , No Print, Disp-30 Tab,R-0             Patient Follow Up Instructions:    Activity: Activity as tolerated  Diet: Cardiac Diet  Wound Care: None needed  Code: Full    DISPOSITION:    Home with Family:    Home with HH/PT/OT/RN:    SNF/LTC: x   NORMAN:    OTHER:          Follow up with:   PCP : None  Follow-up Information     Follow up With Specialties Details Why [address] [place] [address]  [phone]    aFllon Owens MD Cardiology, Cardio Vascular Surgery In 2 weeks  [address] [place] [address]  [address]       None    None [phone] Patient stated that they have no PCP              Total time in minutes spent coordinating this discharge (includes going over instructions, follow-up, prescriptions, and preparing report for sign off to her PCP) :  35 minutes

## 2020-12-10 NOTE — PROGRESS NOTES
Reviewed d/c instructions with pt's niece. Pt's niece did not have any further questions. All pt belongings are accounted for. Both the peripheral IV and tele box were removed.

## 2020-12-10 NOTE — PROGRESS NOTES
End of Shift Note    Bedside shift change report given to Nohemy Gonzalez (oncoming nurse) by Geovanna Alejandra (offgoing nurse). Report included the following information SBAR, Kardex, Intake/Output, MAR and Recent Results    Shift worked:  7p-7a     Shift summary and any significant changes:     Pt's dressings were CDI. Pt received tylenol and tramadol during shift for pain in legs. Pt assisted well with turning. Pt is having loose BMs. Concerns for physician to address:  None     Zone phone for oncoming shift:   [phone]       Activity:  Activity Level: Up with Assistance  Number times ambulated in hallways past shift: 0  Number of times OOB to chair past shift: 0    Cardiac:   Cardiac Monitoring: Yes      Cardiac Rhythm: Normal sinus rhythm    Access:   Current line(s): PIV     Genitourinary:   Urinary status: voiding    Respiratory:   O2 Device: Room air  Chronic home O2 use?: NO  Incentive spirometer at bedside: NO     GI:  Last Bowel Movement Date: 12/09/20  Current diet:  DIET NUTRITIONAL SUPPLEMENTS Breakfast, Lunch, Dinner; Ensure Clear  DIET CARDIAC Regular  Passing flatus: YES  Tolerating current diet: YES  % Diet Eaten: 40 %    Pain Management:   Patient states pain is manageable on current regimen: YES    Skin:  Darien Score: 17  Interventions: speciality bed, increase time out of bed and limit briefs    Patient Safety:  Fall Score:  Total Score: 4  Interventions: bed/chair alarm, assistive device (walker, cane, etc), gripper socks and pt to call before getting OOB  High Fall Risk: Yes    Length of Stay:  Expected LOS: 4d 2h  Actual LOS: 9      Geovanna Ny

## 2021-01-28 ENCOUNTER — OFFICE VISIT (OUTPATIENT)
Dept: INTERNAL MEDICINE CLINIC | Age: 76
End: 2021-01-28
Payer: MEDICARE

## 2021-01-28 VITALS
SYSTOLIC BLOOD PRESSURE: 120 MMHG | BODY MASS INDEX: 23.9 KG/M2 | HEART RATE: 87 BPM | OXYGEN SATURATION: 98 % | DIASTOLIC BLOOD PRESSURE: 68 MMHG | RESPIRATION RATE: 15 BRPM | HEIGHT: 64 IN | WEIGHT: 140 LBS | TEMPERATURE: 98.1 F

## 2021-01-28 DIAGNOSIS — I50.22 SYSTOLIC CHF, CHRONIC (HCC): ICD-10-CM

## 2021-01-28 DIAGNOSIS — Z23 ENCOUNTER FOR IMMUNIZATION: ICD-10-CM

## 2021-01-28 DIAGNOSIS — Z76.89 ESTABLISHING CARE WITH NEW DOCTOR, ENCOUNTER FOR: Primary | ICD-10-CM

## 2021-01-28 DIAGNOSIS — Z23 NEEDS FLU SHOT: ICD-10-CM

## 2021-01-28 DIAGNOSIS — G62.9 NEUROPATHY: ICD-10-CM

## 2021-01-28 DIAGNOSIS — L60.2 OVERGROWN TOENAILS: ICD-10-CM

## 2021-01-28 DIAGNOSIS — L98.492 CHRONIC SKIN ULCER WITH FAT LAYER EXPOSED (HCC): ICD-10-CM

## 2021-01-28 DIAGNOSIS — I10 ESSENTIAL HYPERTENSION: ICD-10-CM

## 2021-01-28 PROCEDURE — 1090F PRES/ABSN URINE INCON ASSESS: CPT | Performed by: NURSE PRACTITIONER

## 2021-01-28 PROCEDURE — 90662 IIV NO PRSV INCREASED AG IM: CPT | Performed by: NURSE PRACTITIONER

## 2021-01-28 PROCEDURE — G8754 DIAS BP LESS 90: HCPCS | Performed by: NURSE PRACTITIONER

## 2021-01-28 PROCEDURE — G8427 DOCREV CUR MEDS BY ELIG CLIN: HCPCS | Performed by: NURSE PRACTITIONER

## 2021-01-28 PROCEDURE — G8432 DEP SCR NOT DOC, RNG: HCPCS | Performed by: NURSE PRACTITIONER

## 2021-01-28 PROCEDURE — G0008 ADMIN INFLUENZA VIRUS VAC: HCPCS | Performed by: NURSE PRACTITIONER

## 2021-01-28 PROCEDURE — G8420 CALC BMI NORM PARAMETERS: HCPCS | Performed by: NURSE PRACTITIONER

## 2021-01-28 PROCEDURE — 1101F PT FALLS ASSESS-DOCD LE1/YR: CPT | Performed by: NURSE PRACTITIONER

## 2021-01-28 PROCEDURE — G8400 PT W/DXA NO RESULTS DOC: HCPCS | Performed by: NURSE PRACTITIONER

## 2021-01-28 PROCEDURE — G8536 NO DOC ELDER MAL SCRN: HCPCS | Performed by: NURSE PRACTITIONER

## 2021-01-28 PROCEDURE — G8752 SYS BP LESS 140: HCPCS | Performed by: NURSE PRACTITIONER

## 2021-01-28 PROCEDURE — 99203 OFFICE O/P NEW LOW 30 MIN: CPT | Performed by: NURSE PRACTITIONER

## 2021-01-28 RX ORDER — ASPIRIN 81 MG/1
81 TABLET ORAL DAILY
Qty: 30 TAB | Refills: 0 | Status: SHIPPED | OUTPATIENT
Start: 2021-01-28 | End: 2021-03-15

## 2021-01-28 RX ORDER — POLYETHYLENE GLYCOL 3350 17 G/17G
17 POWDER, FOR SOLUTION ORAL DAILY
COMMUNITY
End: 2021-01-28 | Stop reason: SDUPTHER

## 2021-01-28 RX ORDER — TROLAMINE SALICYLATE 10 G/100G
CREAM TOPICAL AS NEEDED
COMMUNITY
End: 2021-01-28 | Stop reason: SDUPTHER

## 2021-01-28 RX ORDER — ACETAMINOPHEN 325 MG/1
TABLET ORAL
COMMUNITY
End: 2021-01-28 | Stop reason: SDUPTHER

## 2021-01-28 RX ORDER — ESOMEPRAZOLE MAGNESIUM 40 MG/1
40 CAPSULE, DELAYED RELEASE ORAL
Qty: 30 CAP | Refills: 1 | Status: SHIPPED | OUTPATIENT
Start: 2021-01-28 | End: 2021-02-25

## 2021-01-28 RX ORDER — PREGABALIN 50 MG/1
CAPSULE ORAL
COMMUNITY
End: 2021-01-28 | Stop reason: SDUPTHER

## 2021-01-28 RX ORDER — AMMONIUM LACTATE 12 G/100G
1 LOTION TOPICAL AS NEEDED
Qty: 1 BOTTLE | Refills: 1 | Status: SHIPPED | OUTPATIENT
Start: 2021-01-28 | End: 2022-10-11

## 2021-01-28 RX ORDER — SENNOSIDES 8.6 MG/1
1 TABLET ORAL DAILY
COMMUNITY
End: 2021-01-28 | Stop reason: SDUPTHER

## 2021-01-28 RX ORDER — SENNOSIDES 8.6 MG/1
1 TABLET ORAL DAILY
Qty: 90 TAB | Refills: 0 | Status: SHIPPED | OUTPATIENT
Start: 2021-01-28 | End: 2021-02-25

## 2021-01-28 RX ORDER — PREGABALIN 50 MG/1
50 CAPSULE ORAL 2 TIMES DAILY
Qty: 60 CAP | Refills: 2 | Status: SHIPPED | OUTPATIENT
Start: 2021-01-28 | End: 2021-04-29 | Stop reason: SDUPTHER

## 2021-01-28 RX ORDER — TROLAMINE SALICYLATE 10 G/100G
CREAM TOPICAL AS NEEDED
Qty: 60 G | Refills: 1 | Status: SHIPPED | OUTPATIENT
Start: 2021-01-28 | End: 2021-02-25

## 2021-01-28 RX ORDER — ACETAMINOPHEN 325 MG/1
325 TABLET ORAL
Qty: 60 TAB | Refills: 1 | Status: SHIPPED | OUTPATIENT
Start: 2021-01-28 | End: 2021-04-29 | Stop reason: SDUPTHER

## 2021-01-28 RX ORDER — AMMONIUM LACTATE 12 G/100G
LOTION TOPICAL AS NEEDED
COMMUNITY
End: 2021-01-28 | Stop reason: SDUPTHER

## 2021-01-28 RX ORDER — PANTOPRAZOLE SODIUM 40 MG/1
40 TABLET, DELAYED RELEASE ORAL
Qty: 30 TAB | Refills: 0 | Status: CANCELLED | OUTPATIENT
Start: 2021-01-28

## 2021-01-28 RX ORDER — CLONIDINE HYDROCHLORIDE 0.2 MG/1
0.2 TABLET ORAL 3 TIMES DAILY
Qty: 90 TAB | Refills: 0 | Status: SHIPPED | OUTPATIENT
Start: 2021-01-28 | End: 2021-03-03

## 2021-01-28 RX ORDER — ESOMEPRAZOLE MAGNESIUM 40 MG/1
CAPSULE, DELAYED RELEASE ORAL DAILY
COMMUNITY
End: 2021-01-28 | Stop reason: SDUPTHER

## 2021-01-28 RX ORDER — AMLODIPINE BESYLATE 10 MG/1
10 TABLET ORAL DAILY
Qty: 30 TAB | Refills: 0 | Status: SHIPPED | OUTPATIENT
Start: 2021-01-28 | End: 2021-03-03

## 2021-01-28 RX ORDER — POLYETHYLENE GLYCOL 3350 17 G/17G
17 POWDER, FOR SOLUTION ORAL DAILY
Qty: 30 PACKET | Refills: 1 | Status: SHIPPED | OUTPATIENT
Start: 2021-01-28 | End: 2021-04-29 | Stop reason: SDUPTHER

## 2021-01-28 NOTE — PROGRESS NOTES
Subjective: (As above and below)     Chief Complaint   Patient presents with   [facility] Follow Up     JADEN Oliver is a 66y.o. year old female who presents to [facility] care    She was recently hospitalized for cellulitis and then dc'd to rehab. Is home now, her son lives w/ her. She is here w/ her niece today. Prior to hospitalization she had not sought medical care in many years    She was admitted from 12/1/20 to 12/10/20 for LLE ulcer and cellulitis  Also found to be very hypertensive w/ cardiomyopathy- EF 35-40%    Cellulitis: [facility] care is coming MWF for dressing changes, there is improvement in redness but wound is open w/ scant drainage. No fevers. She had normal CARISSA in patient. She has burning sensations to feet (pt not diabetic), lyrica is helping some. She needs a podiatry referral for overgrown nails. She has skin darkening to bilat lower ext    TONJA: creatinine improving thru hospital course    Hypertension ROS:  taking medications as instructed, no medication side effects noted, no TIAs, no chest pain on exertion, no dyspnea on exertion, no swelling of ankles    Systolic CHF: denies LACY, leg swelling, chest pain    Pre-diabetes: does enjoy pepsi    Constipation: medication is helping    She is ambulatory but only short distances due to pain. She has assistance from family  No falls since being home      Reviewed PmHx, RxHx, FmHx, SocHx, AllgHx and updated in chart.   Family History   Problem Relation Age of Onset    Diabetes Mother     Diabetes Father     Diabetes Son        Past Medical History:   Diagnosis Date    Cardiomyopathy [facility]) 12/3/2020    HFrEF (heart failure with reduced ejection fraction) ([facility] [address] 12/3/2020    Hypertension, uncontrolled 12/3/2020    Mobitz type 2 second degree atrioventricular block 82/3/8766    Systolic heart failure ([address] 12/3/2020      Social History     Socioeconomic History    Marital status: SINGLE     Spouse name: Not on file    Number of children: 2    Years of education: Not on file    Highest education level: Not on file   Tobacco Use    Smoking status: Never Smoker    Smokeless tobacco: Never Used   Substance and Sexual Activity    Alcohol use: Never     Frequency: Never    Drug use: Never    Sexual activity: Not Currently     Partners: Male          Current Outpatient Medications   Medication Sig    acetaminophen (TylenoL) 325 mg tablet Take 1 Tab by mouth every four (4) hours as needed for Pain.  amLODIPine (NORVASC) 10 mg tablet Take 1 Tab by mouth daily.  ammonium lactate (LAC-HYDRIN) 12 % lotion Apply 1 Each to affected area as needed (dry skin). rub in to affected area well    aspirin delayed-release 81 mg tablet Take 1 Tab by mouth daily.  cloNIDine HCL (CATAPRES) 0.2 mg tablet Take 1 Tab by mouth three (3) times daily.  collagenase (SANTYL) 250 unit/gram ointment Apply  to affected area daily.  esomeprazole (NEXIUM) 40 mg capsule Take 1 Cap by mouth daily as needed for Gastroesophageal Reflux Disease (GERD).  nitroglycerin (NITROBID) 2 % ointment Apply 1 Inch to affected area three (3) times daily.  polyethylene glycol (Miralax) 17 gram packet Take 1 Packet by mouth daily. constipation    pregabalin (Lyrica) 50 mg capsule Take 1 Cap by mouth two (2) times a day. Max Daily Amount: 100 mg.    senna (SENOKOT) 8.6 mg tablet Take 1 Tab by mouth daily. constipation    trolamine salicylate (MYOFLEX) 10 % topical cream Apply  to affected area as needed for Pain.  pantoprazole (PROTONIX) 40 mg tablet Take 1 Tab by mouth Daily (before breakfast). No current facility-administered medications for this visit. Review of Systems:   Constitutional:    Negative for fever and chills, negative diaphoresis. HEENT:              Negative for neck pain and stiffness. Eyes:                  Negative for visual disturbance, itching, redness or discharge.    Respiratory:        Negative for cough and shortness of breath. Cardiovascular:  Negative for chest pain and palpitations. Gastrointestinal: Negative for nausea, vomiting, abdominal pain, diarrhea or constipation. Genitourinary:     Negative for dysuria and frequency. Musculoskeletal: Negative for falls, tenderness and swelling. Skin:                    Negative for rash, masses or lesions. Neurological:       Negative for dizzyness, seizure, loss of consciousness, weakness and numbness. Objective:     Vitals:    01/28/21 1012   BP: 120/68   Pulse: 87   Resp: 15   Temp: 98.1 °F (36.7 °C)   TempSrc: Temporal   SpO2: 98%   Weight: 140 lb (63.5 kg)   Height: 5' 4\" (1.626 m)       Results for orders placed or performed during the hospital encounter of 12/01/20   CULTURE, WOUND W GRAM STAIN    Specimen: Toe; Wound   Result Value Ref Range    Special Requests: NO SPECIAL REQUESTS      GRAM STAIN RARE WBCS SEEN      GRAM STAIN FEW EPITHELIAL CELLS SEEN      GRAM STAIN NO ORGANISMS SEEN      Culture result: LIGHT STAPHYLOCOCCUS AUREUS (A)      Culture result: HEAVY DIPHTHEROIDS (A)         Susceptibility    Staphylococcus aureus - JOSE LUIS     Clindamycin ($) 0.25 Susceptible ug/mL     Daptomycin ($$$$$) 0.25 Susceptible ug/mL     Erythromycin ($$$$) <=0.25 Susceptible ug/mL     Gentamicin ($) <=0.5 Susceptible ug/mL     Linezolid ($$$$$) 2 Susceptible ug/mL     Oxacillin 2 Susceptible ug/mL     Rifampin ($$$$)* <=0.5 Susceptible ug/mL      * Rifampin is not to be used for mono-therapy.      Tetracycline <=1 Susceptible ug/mL     Trimeth/Sulfa <=10 Susceptible ug/mL     Vancomycin ($) 1 Susceptible ug/mL     Ciprofloxacin ($) 1 Susceptible ug/mL     Levofloxacin ($) 1 Susceptible ug/mL     Moxifloxacin ($$$$) <=0.25 Susceptible ug/mL     Doxycycline ($$) <=0.5 Susceptible ug/mL   CBC WITH AUTOMATED DIFF   Result Value Ref Range    WBC 9.9 3.6 - 11.0 K/uL    RBC 3.65 (L) 3.80 - 5.20 M/uL    HGB 10.5 (L) 11.5 - 16.0 g/dL    HCT 33.2 (L) 35.0 - 47.0 %    MCV 91.0 80.0 - 99.0 FL    MCH 28.8 26.0 - 34.0 PG    MCHC 31.6 30.0 - 36.5 g/dL    RDW 14.3 11.5 - 14.5 %    PLATELET 668 994 - 841 K/uL    NRBC 0.0 0  WBC    ABSOLUTE NRBC 0.00 0.00 - 0.01 K/uL    NEUTROPHILS 71 32 - 75 %    LYMPHOCYTES 20 12 - 49 %    MONOCYTES 8 5 - 13 %    EOSINOPHILS 1 0 - 7 %    BASOPHILS 1 0 - 1 %    IMMATURE GRANULOCYTES 0 0.0 - 0.5 %    ABS. NEUTROPHILS 7.0 1.8 - 8.0 K/UL    ABS. LYMPHOCYTES 2.0 0.8 - 3.5 K/UL    ABS. MONOCYTES 0.7 0.0 - 1.0 K/UL    ABS. EOSINOPHILS 0.1 0.0 - 0.4 K/UL    ABS. BASOPHILS 0.1 0.0 - 0.1 K/UL    ABS. IMM. GRANS. 0.0 0.00 - 0.04 K/UL    DF AUTOMATED     METABOLIC PANEL, COMPREHENSIVE   Result Value Ref Range    Sodium 140 136 - 145 mmol/L    Potassium 2.8 (L) 3.5 - 5.1 mmol/L    Chloride 103 97 - 108 mmol/L    CO2 30 21 - 32 mmol/L    Anion gap 7 5 - 15 mmol/L    Glucose 135 (H) 65 - 100 mg/dL    BUN 24 (H) 6 - 20 MG/DL    Creatinine 1.35 (H) 0.55 - 1.02 MG/DL    BUN/Creatinine ratio 18 12 - 20      GFR est AA 46 (L) >60 ml/min/1.73m2    GFR est non-AA 38 (L) >60 ml/min/1.73m2    Calcium 8.6 8.5 - 10.1 MG/DL    Bilirubin, total 0.8 0.2 - 1.0 MG/DL    ALT (SGPT) 19 12 - 78 U/L    AST (SGOT) 18 15 - 37 U/L    Alk.  phosphatase 108 45 - 117 U/L    Protein, total 8.3 (H) 6.4 - 8.2 g/dL    Albumin 3.2 (L) 3.5 - 5.0 g/dL    Globulin 5.1 (H) 2.0 - 4.0 g/dL    A-G Ratio 0.6 (L) 1.1 - 2.2     NT-PRO BNP   Result Value Ref Range    NT pro-BNP 6,517 (H) <450 PG/ML   TROPONIN I   Result Value Ref Range    Troponin-I, Qt. <0.05 <5.03 ng/mL   METABOLIC PANEL, BASIC   Result Value Ref Range    Sodium 143 136 - 145 mmol/L    Potassium 2.9 (L) 3.5 - 5.1 mmol/L    Chloride 107 97 - 108 mmol/L    CO2 31 21 - 32 mmol/L    Anion gap 5 5 - 15 mmol/L    Glucose 121 (H) 65 - 100 mg/dL    BUN 25 (H) 6 - 20 MG/DL    Creatinine 1.23 (H) 0.55 - 1.02 MG/DL    BUN/Creatinine ratio 20 12 - 20      GFR est AA 51 (L) >60 ml/min/1.73m2    GFR est non-AA 42 (L) >60 ml/min/1.73m2    Calcium 8.8 8.5 - 10.1 MG/DL   CBC W/O DIFF   Result Value Ref Range    WBC 8.9 3.6 - 11.0 K/uL    RBC 3.42 (L) 3.80 - 5.20 M/uL    HGB 9.8 (L) 11.5 - 16.0 g/dL    HCT 30.9 (L) 35.0 - 47.0 %    MCV 90.4 80.0 - 99.0 FL    MCH 28.7 26.0 - 34.0 PG    MCHC 31.7 30.0 - 36.5 g/dL    RDW 14.3 11.5 - 14.5 %    PLATELET 263 (L) 690 - 400 K/uL    NRBC 0.0 0  WBC    ABSOLUTE NRBC 0.00 0.00 - 0.01 K/uL   HEMOGLOBIN A1C WITH EAG   Result Value Ref Range    Hemoglobin A1c 5.9 (H) 4.0 - 5.6 %    Est. average glucose 123 mg/dL   PROCALCITONIN   Result Value Ref Range    Procalcitonin <5.82 ng/mL   METABOLIC PANEL, BASIC   Result Value Ref Range    Sodium 140 136 - 145 mmol/L    Potassium 3.6 3.5 - 5.1 mmol/L    Chloride 108 97 - 108 mmol/L    CO2 27 21 - 32 mmol/L    Anion gap 5 5 - 15 mmol/L    Glucose 143 (H) 65 - 100 mg/dL    BUN 27 (H) 6 - 20 MG/DL    Creatinine 1.90 (H) 0.55 - 1.02 MG/DL    BUN/Creatinine ratio 14 12 - 20      GFR est AA 31 (L) >60 ml/min/1.73m2    GFR est non-AA 26 (L) >60 ml/min/1.73m2    Calcium 8.5 8.5 - 10.1 MG/DL   CBC WITH AUTOMATED DIFF   Result Value Ref Range    WBC 9.3 3.6 - 11.0 K/uL    RBC 3.31 (L) 3.80 - 5.20 M/uL    HGB 9.5 (L) 11.5 - 16.0 g/dL    HCT 29.9 (L) 35.0 - 47.0 %    MCV 90.3 80.0 - 99.0 FL    MCH 28.7 26.0 - 34.0 PG    MCHC 31.8 30.0 - 36.5 g/dL    RDW 14.6 (H) 11.5 - 14.5 %    PLATELET 147 (L) 622 - 400 K/uL    NRBC 0.0 0  WBC    ABSOLUTE NRBC 0.00 0.00 - 0.01 K/uL    NEUTROPHILS 72 32 - 75 %    LYMPHOCYTES 16 12 - 49 %    MONOCYTES 8 5 - 13 %    EOSINOPHILS 3 0 - 7 %    BASOPHILS 1 0 - 1 %    IMMATURE GRANULOCYTES 0 0.0 - 0.5 %    ABS. NEUTROPHILS 6.8 1.8 - 8.0 K/UL    ABS. LYMPHOCYTES 1.5 0.8 - 3.5 K/UL    ABS. MONOCYTES 0.8 0.0 - 1.0 K/UL    ABS. EOSINOPHILS 0.3 0.0 - 0.4 K/UL    ABS. BASOPHILS 0.1 0.0 - 0.1 K/UL    ABS. IMM.  GRANS. 0.0 0.00 - 0.04 K/UL    DF AUTOMATED     MAGNESIUM   Result Value Ref Range    Magnesium 2.2 1.6 - 2.4 mg/dL   CBC WITH AUTOMATED DIFF   Result Value Ref Range    WBC 8.3 3.6 - 11.0 K/uL    RBC 3.54 (L) 3.80 - 5.20 M/uL    HGB 10.1 (L) 11.5 - 16.0 g/dL    HCT 31.9 (L) 35.0 - 47.0 %    MCV 90.1 80.0 - 99.0 FL    MCH 28.5 26.0 - 34.0 PG    MCHC 31.7 30.0 - 36.5 g/dL    RDW 14.6 (H) 11.5 - 14.5 %    PLATELET 580 (L) 546 - 400 K/uL    NRBC 0.0 0  WBC    ABSOLUTE NRBC 0.00 0.00 - 0.01 K/uL    NEUTROPHILS 66 32 - 75 %    LYMPHOCYTES 23 12 - 49 %    MONOCYTES 10 5 - 13 %    EOSINOPHILS 1 0 - 7 %    BASOPHILS 1 0 - 1 %    IMMATURE GRANULOCYTES 1 (H) 0.0 - 0.5 %    ABS. NEUTROPHILS 5.5 1.8 - 8.0 K/UL    ABS. LYMPHOCYTES 1.9 0.8 - 3.5 K/UL    ABS. MONOCYTES 0.8 0.0 - 1.0 K/UL    ABS. EOSINOPHILS 0.1 0.0 - 0.4 K/UL    ABS. BASOPHILS 0.0 0.0 - 0.1 K/UL    ABS. IMM.  GRANS. 0.0 0.00 - 0.04 K/UL    DF AUTOMATED     METABOLIC PANEL, BASIC   Result Value Ref Range    Sodium 140 136 - 145 mmol/L    Potassium 3.7 3.5 - 5.1 mmol/L    Chloride 108 97 - 108 mmol/L    CO2 24 21 - 32 mmol/L    Anion gap 8 5 - 15 mmol/L    Glucose 131 (H) 65 - 100 mg/dL    BUN 34 (H) 6 - 20 MG/DL    Creatinine 2.02 (H) 0.55 - 1.02 MG/DL    BUN/Creatinine ratio 17 12 - 20      GFR est AA 29 (L) >60 ml/min/1.73m2    GFR est non-AA 24 (L) >60 ml/min/1.73m2    Calcium 8.5 8.5 - 10.1 MG/DL   TROPONIN I   Result Value Ref Range    Troponin-I, Qt. <0.05 <3.76 ng/mL   METABOLIC PANEL, BASIC   Result Value Ref Range    Sodium 139 136 - 145 mmol/L    Potassium 3.8 3.5 - 5.1 mmol/L    Chloride 107 97 - 108 mmol/L    CO2 24 21 - 32 mmol/L    Anion gap 8 5 - 15 mmol/L    Glucose 120 (H) 65 - 100 mg/dL    BUN 43 (H) 6 - 20 MG/DL    Creatinine 1.80 (H) 0.55 - 1.02 MG/DL    BUN/Creatinine ratio 24 (H) 12 - 20      GFR est AA 33 (L) >60 ml/min/1.73m2    GFR est non-AA 27 (L) >60 ml/min/1.73m2    Calcium 8.3 (L) 8.5 - 10.1 MG/DL   CBC WITH AUTOMATED DIFF   Result Value Ref Range    WBC 10.1 3.6 - 11.0 K/uL    RBC 3.27 (L) 3.80 - 5.20 M/uL    HGB 9.5 (L) 11.5 - 16.0 g/dL    HCT 29.4 (L) 35.0 - 47.0 %    MCV 89.9 80.0 - 99.0 FL    MCH 29.1 26.0 - 34.0 PG    MCHC 32.3 30.0 - 36.5 g/dL    RDW 14.8 (H) 11.5 - 14.5 %    PLATELET 856 639 - 792 K/uL    NRBC 0.0 0  WBC    ABSOLUTE NRBC 0.00 0.00 - 0.01 K/uL    NEUTROPHILS 80 (H) 32 - 75 %    LYMPHOCYTES 12 12 - 49 %    MONOCYTES 7 5 - 13 %    EOSINOPHILS 0 0 - 7 %    BASOPHILS 0 0 - 1 %    IMMATURE GRANULOCYTES 1 (H) 0.0 - 0.5 %    ABS. NEUTROPHILS 8.0 1.8 - 8.0 K/UL    ABS. LYMPHOCYTES 1.2 0.8 - 3.5 K/UL    ABS. MONOCYTES 0.7 0.0 - 1.0 K/UL    ABS. EOSINOPHILS 0.0 0.0 - 0.4 K/UL    ABS. BASOPHILS 0.0 0.0 - 0.1 K/UL    ABS. IMM. GRANS. 0.1 (H) 0.00 - 0.04 K/UL    DF AUTOMATED     VANCOMYCIN, TROUGH   Result Value Ref Range    Vancomycin,trough 11.0 (H) 5.0 - 10.0 ug/mL    Reported dose date 20201204      Reported dose time: 0000      Reported dose: 750 MG UNITS   CBC WITH AUTOMATED DIFF   Result Value Ref Range    WBC 10.9 3.6 - 11.0 K/uL    RBC 3.78 (L) 3.80 - 5.20 M/uL    HGB 10.8 (L) 11.5 - 16.0 g/dL    HCT 35.5 35.0 - 47.0 %    MCV 93.9 80.0 - 99.0 FL    MCH 28.6 26.0 - 34.0 PG    MCHC 30.4 30.0 - 36.5 g/dL    RDW 15.4 (H) 11.5 - 14.5 %    PLATELET 163 984 - 977 K/uL    MPV 13.0 (H) 8.9 - 12.9 FL    NRBC 0.0 0  WBC    ABSOLUTE NRBC 0.00 0.00 - 0.01 K/uL    NEUTROPHILS 72 32 - 75 %    LYMPHOCYTES 15 12 - 49 %    MONOCYTES 10 5 - 13 %    EOSINOPHILS 2 0 - 7 %    BASOPHILS 1 0 - 1 %    IMMATURE GRANULOCYTES 1 (H) 0.0 - 0.5 %    ABS. NEUTROPHILS 7.9 1.8 - 8.0 K/UL    ABS. LYMPHOCYTES 1.6 0.8 - 3.5 K/UL    ABS. MONOCYTES 1.1 (H) 0.0 - 1.0 K/UL    ABS. EOSINOPHILS 0.2 0.0 - 0.4 K/UL    ABS. BASOPHILS 0.1 0.0 - 0.1 K/UL    ABS. IMM.  GRANS. 0.1 (H) 0.00 - 0.04 K/UL    DF AUTOMATED     METABOLIC PANEL, BASIC   Result Value Ref Range    Sodium 137 136 - 145 mmol/L    Potassium 3.7 3.5 - 5.1 mmol/L    Chloride 108 97 - 108 mmol/L    CO2 22 21 - 32 mmol/L    Anion gap 7 5 - 15 mmol/L    Glucose 102 (H) 65 - 100 mg/dL    BUN 39 (H) 6 - 20 MG/DL    Creatinine 1.39 (H) 0.55 - 1.02 MG/DL    BUN/Creatinine ratio 28 (H) 12 - 20      GFR est AA 45 (L) >60 ml/min/1.73m2    GFR est non-AA 37 (L) >60 ml/min/1.73m2    Calcium 8.4 (L) 8.5 - 89.8 MG/DL   METABOLIC PANEL, BASIC   Result Value Ref Range    Sodium 138 136 - 145 mmol/L    Potassium 3.4 (L) 3.5 - 5.1 mmol/L    Chloride 108 97 - 108 mmol/L    CO2 26 21 - 32 mmol/L    Anion gap 4 (L) 5 - 15 mmol/L    Glucose 122 (H) 65 - 100 mg/dL    BUN 36 (H) 6 - 20 MG/DL    Creatinine 1.24 (H) 0.55 - 1.02 MG/DL    BUN/Creatinine ratio 29 (H) 12 - 20      GFR est AA 51 (L) >60 ml/min/1.73m2    GFR est non-AA 42 (L) >60 ml/min/1.73m2    Calcium 8.4 (L) 8.5 - 10.1 MG/DL   CBC WITH AUTOMATED DIFF   Result Value Ref Range    WBC 10.4 3.6 - 11.0 K/uL    RBC 3.56 (L) 3.80 - 5.20 M/uL    HGB 10.2 (L) 11.5 - 16.0 g/dL    HCT 32.0 (L) 35.0 - 47.0 %    MCV 89.9 80.0 - 99.0 FL    MCH 28.7 26.0 - 34.0 PG    MCHC 31.9 30.0 - 36.5 g/dL    RDW 14.9 (H) 11.5 - 14.5 %    PLATELET 105 980 - 079 K/uL    NRBC 0.2 (H) 0  WBC    ABSOLUTE NRBC 0.02 (H) 0.00 - 0.01 K/uL    NEUTROPHILS 68 32 - 75 %    LYMPHOCYTES 16 12 - 49 %    MONOCYTES 12 5 - 13 %    EOSINOPHILS 3 0 - 7 %    BASOPHILS 0 0 - 1 %    IMMATURE GRANULOCYTES 1 (H) 0.0 - 0.5 %    ABS. NEUTROPHILS 7.0 1.8 - 8.0 K/UL    ABS. LYMPHOCYTES 1.7 0.8 - 3.5 K/UL    ABS. MONOCYTES 1.3 (H) 0.0 - 1.0 K/UL    ABS. EOSINOPHILS 0.3 0.0 - 0.4 K/UL    ABS. BASOPHILS 0.0 0.0 - 0.1 K/UL    ABS. IMM.  GRANS. 0.1 (H) 0.00 - 0.04 K/UL    DF AUTOMATED     CBC WITH AUTOMATED DIFF   Result Value Ref Range    WBC 10.1 3.6 - 11.0 K/uL    RBC 3.68 (L) 3.80 - 5.20 M/uL    HGB 10.5 (L) 11.5 - 16.0 g/dL    HCT 33.3 (L) 35.0 - 47.0 %    MCV 90.5 80.0 - 99.0 FL    MCH 28.5 26.0 - 34.0 PG    MCHC 31.5 30.0 - 36.5 g/dL    RDW 15.1 (H) 11.5 - 14.5 %    PLATELET 191 900 - 945 K/uL    NRBC 0.0 0  WBC    ABSOLUTE NRBC 0.00 0.00 - 0.01 K/uL    NEUTROPHILS 73 32 - 75 %    LYMPHOCYTES 13 12 - 49 %    MONOCYTES 11 5 - 13 %    EOSINOPHILS 2 0 - 7 %    BASOPHILS 0 0 - 1 %    IMMATURE GRANULOCYTES 1 (H) 0.0 - 0.5 %    ABS. NEUTROPHILS 7.4 1.8 - 8.0 K/UL    ABS. LYMPHOCYTES 1.3 0.8 - 3.5 K/UL    ABS. MONOCYTES 1.1 (H) 0.0 - 1.0 K/UL    ABS. EOSINOPHILS 0.2 0.0 - 0.4 K/UL    ABS. BASOPHILS 0.0 0.0 - 0.1 K/UL    ABS. IMM. GRANS. 0.1 (H) 0.00 - 0.04 K/UL    DF AUTOMATED     METABOLIC PANEL, BASIC   Result Value Ref Range    Sodium 138 136 - 145 mmol/L    Potassium 3.9 3.5 - 5.1 mmol/L    Chloride 110 (H) 97 - 108 mmol/L    CO2 23 21 - 32 mmol/L    Anion gap 5 5 - 15 mmol/L    Glucose 106 (H) 65 - 100 mg/dL    BUN 28 (H) 6 - 20 MG/DL    Creatinine 1.05 (H) 0.55 - 1.02 MG/DL    BUN/Creatinine ratio 27 (H) 12 - 20      GFR est AA >60 >60 ml/min/1.73m2    GFR est non-AA 51 (L) >60 ml/min/1.73m2    Calcium 8.4 (L) 8.5 - 10.1 MG/DL   SARS-COV-2   Result Value Ref Range    Specimen source Nasopharyngeal      SARS-CoV-2 Not detected NOTD      Specimen source Nasopharyngeal      Specimen type NP Swab      Health status Symptomatic Testing     CBC WITH AUTOMATED DIFF   Result Value Ref Range    WBC 8.0 3.6 - 11.0 K/uL    RBC 3.31 (L) 3.80 - 5.20 M/uL    HGB 9.5 (L) 11.5 - 16.0 g/dL    HCT 30.3 (L) 35.0 - 47.0 %    MCV 91.5 80.0 - 99.0 FL    MCH 28.7 26.0 - 34.0 PG    MCHC 31.4 30.0 - 36.5 g/dL    RDW 15.4 (H) 11.5 - 14.5 %    PLATELET 320 810 - 395 K/uL    NRBC 0.0 0  WBC    ABSOLUTE NRBC 0.00 0.00 - 0.01 K/uL    NEUTROPHILS 69 32 - 75 %    LYMPHOCYTES 16 12 - 49 %    MONOCYTES 12 5 - 13 %    EOSINOPHILS 2 0 - 7 %    BASOPHILS 1 0 - 1 %    IMMATURE GRANULOCYTES 0 0.0 - 0.5 %    ABS. NEUTROPHILS 5.5 1.8 - 8.0 K/UL    ABS. LYMPHOCYTES 1.2 0.8 - 3.5 K/UL    ABS. MONOCYTES 1.0 0.0 - 1.0 K/UL    ABS. EOSINOPHILS 0.2 0.0 - 0.4 K/UL    ABS. BASOPHILS 0.1 0.0 - 0.1 K/UL    ABS. IMM.  GRANS. 0.0 0.00 - 0.04 K/UL    DF AUTOMATED     METABOLIC PANEL, COMPREHENSIVE   Result Value Ref Range    Sodium 139 136 - 145 mmol/L    Potassium 3.9 3.5 - 5.1 mmol/L    Chloride 110 (H) 97 - 108 mmol/L    CO2 24 21 - 32 mmol/L    Anion gap 5 5 - 15 mmol/L    Glucose 93 65 - 100 mg/dL    BUN 27 (H) 6 - 20 MG/DL    Creatinine 1.20 (H) 0.55 - 1.02 MG/DL    BUN/Creatinine ratio 23 (H) 12 - 20      GFR est AA 53 (L) >60 ml/min/1.73m2    GFR est non-AA 44 (L) >60 ml/min/1.73m2    Calcium 8.6 8.5 - 10.1 MG/DL    Bilirubin, total 0.8 0.2 - 1.0 MG/DL    ALT (SGPT) 23 12 - 78 U/L    AST (SGOT) 36 15 - 37 U/L    Alk. phosphatase 96 45 - 117 U/L    Protein, total 7.1 6.4 - 8.2 g/dL    Albumin 2.5 (L) 3.5 - 5.0 g/dL    Globulin 4.6 (H) 2.0 - 4.0 g/dL    A-G Ratio 0.5 (L) 1.1 - 2.2     METABOLIC PANEL, BASIC   Result Value Ref Range    Sodium 140 136 - 145 mmol/L    Potassium 3.9 3.5 - 5.1 mmol/L    Chloride 108 97 - 108 mmol/L    CO2 24 21 - 32 mmol/L    Anion gap 8 5 - 15 mmol/L    Glucose 96 65 - 100 mg/dL    BUN 25 (H) 6 - 20 MG/DL    Creatinine 1.31 (H) 0.55 - 1.02 MG/DL    BUN/Creatinine ratio 19 12 - 20      GFR est AA 48 (L) >60 ml/min/1.73m2    GFR est non-AA 39 (L) >60 ml/min/1.73m2    Calcium 8.6 8.5 - 10.1 MG/DL   CBC WITH AUTOMATED DIFF   Result Value Ref Range    WBC 7.4 3.6 - 11.0 K/uL    RBC 3.78 (L) 3.80 - 5.20 M/uL    HGB 10.7 (L) 11.5 - 16.0 g/dL    HCT 33.9 (L) 35.0 - 47.0 %    MCV 89.7 80.0 - 99.0 FL    MCH 28.3 26.0 - 34.0 PG    MCHC 31.6 30.0 - 36.5 g/dL    RDW 15.2 (H) 11.5 - 14.5 %    PLATELET 688 670 - 163 K/uL    MPV 13.0 (H) 8.9 - 12.9 FL    NRBC 0.0 0  WBC    ABSOLUTE NRBC 0.00 0.00 - 0.01 K/uL    NEUTROPHILS 59 32 - 75 %    LYMPHOCYTES 22 12 - 49 %    MONOCYTES 14 (H) 5 - 13 %    EOSINOPHILS 3 0 - 7 %    BASOPHILS 1 0 - 1 %    IMMATURE GRANULOCYTES 1 (H) 0.0 - 0.5 %    ABS. NEUTROPHILS 4.4 1.8 - 8.0 K/UL    ABS. LYMPHOCYTES 1.6 0.8 - 3.5 K/UL    ABS. MONOCYTES 1.0 0.0 - 1.0 K/UL    ABS. EOSINOPHILS 0.2 0.0 - 0.4 K/UL    ABS. BASOPHILS 0.0 0.0 - 0.1 K/UL    ABS. IMM.  GRANS. 0.1 (H) 0.00 - 0.04 K/UL    DF AUTOMATED     EKG, 12 LEAD, INITIAL   Result Value Ref Range    Ventricular Rate 105 BPM    Atrial Rate 105 BPM    P-R Interval 150 ms    QRS Duration 90 ms    Q-T Interval 374 ms    QTC Calculation (Bezet) 494 ms    Calculated P Axis 36 degrees    Calculated R Axis 3 degrees    Calculated T Axis -105 degrees    Diagnosis       Sinus tachycardia  Possible Left atrial enlargement  Left ventricular hypertrophy  ST & T wave abnormality, consider lateral ischemia  No previous ECGs available  Confirmed by Pelon Davey MD [id number]) on 12/1/2020 2:00:00 PM     EKG, 12 LEAD, INITIAL   Result Value Ref Range    Ventricular Rate 72 BPM    Atrial Rate 72 BPM    P-R Interval 164 ms    QRS Duration 88 ms    Q-T Interval 458 ms    QTC Calculation (Bezet) 501 ms    Calculated P Axis 33 degrees    Calculated R Axis 0 degrees    Calculated T Axis -123 degrees    Diagnosis       Normal sinus rhythm  Minimal voltage criteria for LVH, may be normal variant ( R in aVL )  T wave abnormality, consider inferior ischemia  T wave abnormality, consider anterolateral ischemia    Confirmed by Pelon Davey MD ([id number]) on 12/4/2020 11:50:32 AM     NUCLEAR CARDIAC STRESS TEST   Result Value Ref Range    Target  bpm    Exercise duration time 00:01:10     Stress Base Systolic  mmHg    Stress Base Diastolic BP 81 mmHg    Post peak  BPM    Baseline HR 92 BPM    Estimated workload 1.0 METS    Baseline  mmHg    O2 sat rest 99 %    Percent HR 73 %    O2 sat peak 100 %    Stress Base Diastolic BP 81 mmHg    Stress Rate Pressure Product 17,430 BPM*mmHg    ST Elevation (mm) 0 mm    ST Depression (mm) 0 mm   ANKLE BRACHIAL INDEX   Result Value Ref Range    Left arm  mmHg    Left posterior tibial 0 mmHg    Left CARISSA 0.00     Left anterior tibial 0 mmHg    Left toe pressure 209 mmHg    Left TBI 1.01     Right arm  mmHg    Right posterior tibial 0 mmHg    Right anterior tibial 0 mmHg    Right CARISSA 0.00     Right toe pressure 209 mmHg    Right TBI 1.01    ECHO ADULT COMPLETE   Result Value Ref Range IVSd 1.01 (A) 0.6 - 0.9 cm    LVIDd 3.97 3.9 - 5.3 cm    LVIDs 3.47 cm    LVOT d 2.03 cm    LVPWd 1.02 (A) 0.6 - 0.9 cm    LV Ejection Fraction MOD 4C 35 percent    LV ED Vol A4C 125.84 mL    LV ES Vol A4C 82.00 mL    LVIDd (M-mode) 4.72 cm    LVIDs (M-mode) 3.68 cm    LVOT Cardiac Output 4.05 liter/minute    LVOT Peak Gradient 2.57 mmHg    Left Ventricular Outflow Tract Mean Gradient 1.42 mmHg    LVOT SV 55.0 mL    LVOT Peak Velocity 80.12 cm/s    LVOT VTI 17.06 cm    RVSP 45.02 mmHg    Left Atrium Major Axis 3.15 cm    LA Area 4C 13.76 cm2    LA Vol 4C 26.60 22 - 52 mL    Est. RA Pressure 10.00 mmHg    Aortic Valve Area by Continuity of Peak Velocity 2.46 cm2    Aortic Valve Area by Continuity of VTI 3.18 cm2    AoV PG 4.42 mmHg    Aortic Valve Systolic Mean Gradient 9.27 mmHg    Aortic Valve Systolic Peak Velocity 625.04 cm/s    AoV VTI 17.30 cm    MV A Gamaliel 80.38 cm/s    Mitral Valve E Wave Deceleration Time 262.11 ms    MV E Gamaliel 80.38 cm/s    E/E' ratio (averaged) 17.69     E/E' lateral 15.82     E/E' septal 19.56     LV E' Lateral Velocity 5.08 cm/s    LV E' Septal Velocity 4.11 cm/s    MVA VTI 2.17 cm2    TR Max Velocity 488. 38 cm/s    MV Peak Gradient 4.82 mmHg    MV Mean Gradient 2.44 mmHg    Mitral Valve Pressure Half-time 76.02 ms    Mitral Valve Max Velocity 109.77 cm/s    Mitral Valve Annulus Velocity Time Integral 25.41 cm    MVA (PHT) 2.89 cm2    Pulmonic Valve Systolic Peak Instantaneous Gradient 1.62 mmHg    Pulmonic Valve Systolic Mean Gradient 0.49 mmHg    Pulmonic Valve Max Velocity 63.71 cm/s    Pulmonic Valve Systolic Velocity Time Integral 11.95 cm    Triscuspid Valve Regurgitation Peak Gradient 35.02 mmHg    TR Max Velocity 295.31 cm/s    Ao Root D 2.67 cm    MV E/A 1.00     LV Mass .3 67 - 162 g    LV Mass AL Index 83.0 43 - 95 g/m2    Left Atrium Minor Axis 2.04 cm    LA Vol Index 17.20 16 - 28 ml/m2    LVED Vol Index A4C 81.4 mL/m2    LVES Vol Index A4C 53.0 mL/m2    GAETANO/BSA Pk Gamaliel 1.6 cm2/m2    GAETANO/BSA VTI 2.1 cm2/m2         Physical Examination: General appearance - alert, well appearing, and in no distress  Mental status - alert, oriented to person, place, and time, normal mood, behavior, speech, dress, motor activity, and thought processes  Chest - clear to auscultation, no wheezes, rales or rhonchi, symmetric air entry  Heart - normal rate, regular rhythm, normal S1, S2, no murmurs, rubs, clicks or gallops  Extremities - bilateral lower ext have skin darkening, middle toe to left foot is quite dark pedal pulses + bilat, no pitting edema  To RLE there is an ulcer approx 3.5cm around, wound best is pink and moist, no purulent drainage noted  Surrounding skin is not red/hot      Assessment/ Plan:     Follow-up and Dispositions    · Return in about 3 months (around 4/28/2021). 1. Establishing care with new doctor, encounter for  Labs next visit      2. Neuropathy    - pregabalin (Lyrica) 50 mg capsule; Take 1 Cap by mouth two (2) times a day. Max Daily Amount: 100 mg. Dispense: 60 Cap; Refill: 2  - REFERRAL TO PODIATRY    3. Needs flu shot    - INFLUENZA, HIGH DOSE SEASONAL    4. Chronic skin ulcer with fat layer exposed ([address]  Slow healing, recc fu due to ? PVD  - REFERRAL TO WOUND CARE    5. Systolic CHF, chronic ([address]  Wishes for closer cardiologist  - REFERRAL TO CARDIOLOGY    6. Overgrown toenails  - REFERRAL TO PODIATRY    7. Essential hypertension      8. Encounter for immunization            I have discussed the diagnosis with the patient and the intended plan as seen in the above orders. The patient has received an after-visit summary and questions were answered concerning future plans. Pt conveyed understanding of plan. Medication Side Effects and Warnings were discussed with patient: yes  Patient Labs were reviewed: yes  Patient Past Records were reviewed:  yes    Leonel Sheridan NP

## 2021-01-28 NOTE — PATIENT INSTRUCTIONS
Prediabetes: Care Instructions  Overview     Prediabetes is a warning sign that you're at risk for getting type 2 diabetes. It means that your blood sugar is higher than it should be. But it's not high enough to be diabetes. The food you eat naturally turns into sugar. Your body uses the sugar for energy. Normally, an organ called the pancreas makes insulin. And insulin allows the sugar in your blood to get into your body's cells. But sometimes the body can't use insulin the right way. So the sugar stays in your blood instead. This is called insulin resistance. The buildup of sugar in your blood means you have prediabetes. The good news is that you may be able to prevent or delay diabetes. Making small lifestyle changes, like getting active and changing your eating habits, may help you get your blood sugar back to normal. You can work with your doctor to make a treatment plan. Follow-up care is a key part of your treatment and safety. Be sure to make and go to all appointments, and call your doctor if you are having problems. It's also a good idea to know your test results and keep a list of the medicines you take. How can you care for yourself at home? · Watch your weight. A healthy weight helps your body use insulin properly. · Limit the amount of calories, sweets, and unhealthy fat you eat. Ask your doctor if you should see a dietitian. A registered dietitian can help you create meal plans that fit your lifestyle. · Get at least 30 minutes of exercise on most days of the week. Exercise helps control your blood sugar. It also helps you maintain a healthy weight. Walking is a good choice. You also may want to do other activities, such as running, swimming, cycling, or playing tennis or team sports. · Do not smoke. Smoking can make prediabetes worse. If you need help quitting, talk to your doctor about stop-smoking programs and medicines. These can increase your chances of quitting for good.   · If your doctor prescribed medicines, take them exactly as prescribed. Call your doctor if you think you are having a problem with your medicine. You will get more details on the specific medicines your doctor prescribes. When should you call for help? Watch closely for changes in your health, and be sure to contact your doctor if:    · You have any symptoms of diabetes. These may include:  ? Being thirsty more often. ? Urinating more. ? Being hungrier. ? Losing weight. ? Being very tired. ? Having blurry vision.     · You have a wound that will not heal.     · You have an infection that will not go away.     · You have problems with your blood pressure.     · You want more information about diabetes and how you can keep from getting it. Where can you learn more? Go to http://www.gray.com/  Enter I222 in the search box to learn more about \"Prediabetes: Care Instructions. \"  Current as of: December 20, 2019               Content Version: 12.6  © 0582-2437 [employer]. Care instructions adapted under license by [employer] (which disclaims liability or warranty for this information). If you have questions about a medical condition or this instruction, always ask your healthcare professional. [place] [address] any warranty or liability for your use of this information. Vaccine Information Statement    Influenza (Flu) Vaccine (Inactivated or Recombinant): What You Need to Know    Many Vaccine Information Statements are available in Indian and other languages. See www.immunize.org/vis  Hojas de información sobre vacunas están disponibles en español y en muchos otros idiomas. Visite www.immunize.org/vis    1. Why get vaccinated? Influenza vaccine can prevent influenza (flu). Flu is a contagious disease that spreads around the [place] every year, usually between October and May.  Anyone can get the flu, but it is more dangerous for some people. Infants and young children, people 72years of age and older, pregnant women, and people with certain health conditions or a weakened immune system are at greatest risk of flu complications. Pneumonia, bronchitis, sinus infections and ear infections are examples of flu-related complications. If you have a medical condition, such as heart disease, cancer or diabetes, flu can make it worse. Flu can cause fever and chills, sore throat, muscle aches, fatigue, cough, headache, and runny or stuffy nose. Some people may have vomiting and diarrhea, though this is more common in children than adults. Each year thousands of people in the [facility] die from flu, and many more are hospitalized. Flu vaccine prevents millions of illnesses and flu-related visits to the doctor each year. 2. Influenza vaccines     CDC recommends everyone 10months of age and older get vaccinated every flu season. Children 6 months through 6years of age may need 2 doses during a single flu season. Everyone else needs only 1 dose each flu season. It takes about 2 weeks for protection to develop after vaccination. There are many flu viruses, and they are always changing. Each year a new flu vaccine is made to protect against three or four viruses that are likely to cause disease in the upcoming flu season. Even when the vaccine doesnt exactly match these viruses, it may still provide some protection. Influenza vaccine does not cause flu. Influenza vaccine may be given at the same time as other vaccines. 3. Talk with your health care provider    Tell your vaccine provider if the person getting the vaccine:   Has had an allergic reaction after a previous dose of influenza vaccine, or has any severe, life-threatening allergies.  Has ever had Guillain-Barré Syndrome (also called GBS). In some cases, your health care provider may decide to postpone influenza vaccination to a future visit.     People with minor illnesses, such as a cold, may be vaccinated. People who are moderately or severely ill should usually wait until they recover before getting influenza vaccine. Your health care provider can give you more information. 4. Risks of a reaction     Soreness, redness, and swelling where shot is given, fever, muscle aches, and headache can happen after influenza vaccine.  There may be a very small increased risk of Guillain-Barré Syndrome (GBS) after inactivated influenza vaccine (the flu shot). Duran Jimenez children who get the flu shot along with pneumococcal vaccine (PCV13), and/or DTaP vaccine at the same time might be slightly more likely to have a seizure caused by fever. Tell your health care provider if a child who is getting flu vaccine has ever had a seizure. People sometimes faint after medical procedures, including vaccination. Tell your provider if you feel dizzy or have vision changes or ringing in the ears. As with any medicine, there is a very remote chance of a vaccine causing a severe allergic reaction, other serious injury, or death. 5. What if there is a serious problem? An allergic reaction could occur after the vaccinated person leaves the clinic. If you see signs of a severe allergic reaction (hives, swelling of the face and throat, difficulty breathing, a fast heartbeat, dizziness, or weakness), call 9-1-1 and get the person to the nearest hospital.    For other signs that concern you, call your health care provider. Adverse reactions should be reported to the Vaccine Adverse Event Reporting System (VAERS). Your health care provider will usually file this report, or you can do it yourself. Visit the VAERS website at www.vaers. hhs.gov or call 9-946.705.6188. VAERS is only for reporting reactions, and VAERS staff do not give medical advice.     6. The National Vaccine Injury Compensation Program    The [facility] Vaccine Injury Compensation Program (VICP) is a federal program that was created to compensate people who may have been injured by certain vaccines. Visit the VICP website at www.[facility].gov/vaccinecompensation or call 7-217.695.4673 to learn about the program and about filing a claim. There is a time limit to file a claim for compensation. 7. How can I learn more?  Ask your health care provider.  Call your local or state health department.  Contact the Centers for Disease Control and Prevention (CDC):  - Call 6-380.766.3738 (1-800-CDC-INFO) or  - Visit CDCs influenza website at www.cdc.gov/flu    Vaccine Information Statement (Interim)  Inactivated Influenza Vaccine   8/15/2019  42 ALCON Maradiaga [id number]   Department of Health and Human Services  Centers for Disease Control and Prevention    Office Use Only

## 2021-01-28 NOTE — PROGRESS NOTES
Chief Complaint   Patient presents with   [facility] Follow Up     [facility]       1. Have you been to the ER, urgent care clinic since your last visit? Hospitalized since your last visit? Yes When: 12/02/2020 Where: [facility] Reason for visit: cellulitis    2. Have you seen or consulted any other health care providers outside of the [address] [place] [address] since your last visit? Include any pap smears or colon screening. Angelica Leon is a 66 y.o. female who presents for routine immunizations. She denies any symptoms , reactions or allergies that would exclude them from being immunized today. Risks and adverse reactions were discussed and the VIS was given to them. All questions were addressed. She was observed for 10 min post injection. There were no reactions observed.     Cecilia Villalobos

## 2021-02-01 NOTE — PROGRESS NOTES
[facility] @ [address], [place]  Subjective/HPI:     Maxine Ennis is a 66 y.o. female who was admitted to [facility] December 1, 2020 with left lower extremity cellulitis. She was noted to have a new onset cardiomyopathy ejection fraction 35 to 40%, developed second-degree AV block on carvedilol, hypertension was treated with amlodipine clonidine hydralazine was not started on an ACE or ARB due to TONJA. Nuclear stress test was negative for ischemia, of note the ejection fraction on the nuclear stress test had improved to 52% she is here for routine f/u. The patient denies chest pain/ shortness of breath, orthopnea, PND, , palpitations, syncope, presyncope or fatigue. Recovering bilateral lower extremity edema of the right leg is wrapped and pending wound care visit next week. 12/1/20 [address] [place] Admission for cellulitis Hospitalist Note  Brief Hospital Course by Main Problems:   LE cellulitis  MRI L foot neg for any fracture.  No OM.  Nonspecific diffuse subcutaneous edema.  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  R resting CARISSA is nl.  R TBI 1.01.  L resting CARISSA is normal, L TIB 1.01  A1C 5.9  Procalcitonin <0.05, wound cx showed staph aureus and heavy diptheroids  Pt completed IV abx inpt. Podiatry and wound care evaluated pt during her hospital stay.   outpt f/u with them.       New onset of cardiomyopathy, echo with EF 35-40%  HTN  2nd deg AV block, resolved  Chest pain, resolved  Coreg discontinued due to 2nd deg heart block seen on tele  Trop neg, EKG without acute changes  Cont' Norvasc, clonidine, nitrobid and hydralazine  Not on ACEi/ARBs due to TONJA  Stress test with no ischemia and or infarction,  EF =52%.    Cardiology following, discussed with Dr Rosalina Barone, likely from diuretics and recent contrast use.  Avoid nephrotoxic agents.   cr improved.     Hypokalemia, repleted     PNA on CXR  CXR showed R pleural effusion with basilar air space disease  probnp elevated. Completed IV abx inpt. MAINE scan NST 12/8/20  Interpretation Summary       · Baseline ECG: Normal EKG. · There was no ST segment deviation noted during stress. Indication: Assess for ischemia.     A Lexiscan myocardial SPECT gated wall motion study was performed utilizing 10 mCi of Tc MYOVIEW for rest and 30 mCi for stress.     SPECT imaging at stress and rest demonstrates no definite evidence of ischemia and/or infarction.     Left ventricular ejection fraction equals 52%. Left ventricular wall motion and thickening are within normal limits.     IMPRESSION:  1. No definite evidence of ischemia and/or infarction. 2. LVEF equals 52%. No regional wall motion abnormalities are identified       ECHO 12/2/20  Echo Findings    Left Ventricle Normal cavity size and wall thickness. The estimated EF is 35 - 40%. Mildly reduced systolic function. There is age-appropriate left ventricular diastolic function. Left Atrium Normal cavity size. Right Ventricle Normal cavity size, wall thickness and global systolic function. Right Atrium Normal cavity size. Aortic Valve Normal valve structure, trileaflet valve structure, no stenosis and no regurgitation. Mitral Valve Normal valve structure and no stenosis. Mild regurgitation. Tricuspid Valve Normal valve structure and no stenosis. Mild regurgitation. Pulmonic Valve Normal valve structure and no stenosis. Trace regurgitation. Aorta Normal aortic root. Pulmonary Artery Pulmonary arteries not well visualized. IVC/Hepatic Veins Normal structure. Pericardium Insignificant pericardial effusion or fat.          PCP Provider  Mitzi Canela NP  Past Medical History:   Diagnosis Date    Cardiomyopathy ([address] 12/3/2020    HFrEF (heart failure with reduced ejection fraction) ([address] 12/3/2020    Hypertension, uncontrolled 12/3/2020    Mobitz type 2 second degree atrioventricular block 30/6/0897    Systolic heart failure ([facility] 12/3/2020      No past surgical history on file. No Known Allergies   Family History   Problem Relation Age of Onset    Diabetes Mother     Diabetes Father     Diabetes Son       Current Outpatient Medications   Medication Sig    acetaminophen (TylenoL) 325 mg tablet Take 1 Tab by mouth every four (4) hours as needed for Pain.  amLODIPine (NORVASC) 10 mg tablet Take 1 Tab by mouth daily.  ammonium lactate (LAC-HYDRIN) 12 % lotion Apply 1 Each to affected area as needed (dry skin). rub in to affected area well    aspirin delayed-release 81 mg tablet Take 1 Tab by mouth daily.  cloNIDine HCL (CATAPRES) 0.2 mg tablet Take 1 Tab by mouth three (3) times daily.  collagenase (SANTYL) 250 unit/gram ointment Apply  to affected area daily.  esomeprazole (NEXIUM) 40 mg capsule Take 1 Cap by mouth daily as needed for Gastroesophageal Reflux Disease (GERD).  nitroglycerin (NITROBID) 2 % ointment Apply 1 Inch to affected area three (3) times daily.  polyethylene glycol (Miralax) 17 gram packet Take 1 Packet by mouth daily. constipation    pregabalin (Lyrica) 50 mg capsule Take 1 Cap by mouth two (2) times a day. Max Daily Amount: 100 mg.    senna (SENOKOT) 8.6 mg tablet Take 1 Tab by mouth daily. constipation    trolamine salicylate (MYOFLEX) 10 % topical cream Apply  to affected area as needed for Pain.  pantoprazole (PROTONIX) 40 mg tablet Take 1 Tab by mouth Daily (before breakfast). No current facility-administered medications for this visit. There were no vitals filed for this visit.   Social History     Socioeconomic History    Marital status: SINGLE     Spouse name: Not on file    Number of children: 2    Years of education: Not on file    Highest education level: Not on file   Occupational History    Not on file   Social Needs    Financial resource strain: Not on file    Food insecurity     Worry: Not on file     Inability: Not on file    Transportation needs     Medical: Not on file     Non-medical: Not on file   Tobacco Use    Smoking status: Never Smoker    Smokeless tobacco: Never Used   Substance and Sexual Activity    Alcohol use: Never     Frequency: Never    Drug use: Never    Sexual activity: Not Currently     Partners: Male   Lifestyle    Physical activity     Days per week: Not on file     Minutes per session: Not on file    Stress: Not on file   Relationships    Social connections     Talks on phone: Not on file     Gets together: Not on file     Attends Denominational service: Not on file     Active member of club or organization: Not on file     Attends meetings of clubs or organizations: Not on file     Relationship status: Not on file    Intimate partner violence     Fear of current or ex partner: Not on file     Emotionally abused: Not on file     Physically abused: Not on file     Forced sexual activity: Not on file   Other Topics Concern    Not on file   Social History Narrative    Not on file       I have reviewed the nurses notes, vitals, problem list, allergy list, medical history, family, social history and medications. Review of Symptoms  11 systems reviewed, negative other than as stated in the HPI      Physical Exam:      General: Well developed, in no acute distress, cooperative and alert  HEENT: No carotid bruits, no JVD, trach is midline. Neck Supple,   Heart:  Normal S1/S2 negative S3 or S4. Regular, no murmur, gallop or rub. Respiratory: Clear bilaterally x 4, no wheezing or rales  Abdomen:   Soft, non-tender, no masses, bowel sounds are active. Extremities: Trace edema left lower extremity, did not palpate right lower extremity due to active wound with wrap, bilateral lower extremity skin mottled, irregularly textured no active oozing  Neuro: A&Ox3, speech clear, gait assisted with rolling walker  Skin: Skin color is normal. No rashes or lesions.  Non diaphoretic  Vascular: 2+ pulses symmetric in bilateral radial pulses    Cardiographics    ECG: Normal sinus rhythm        Cardiology Labs:  No results found for: CHOL, CHOLX, CHLST, CHOLV, [id number], HDL, HDLP, LDL, LDLC, DLDLP, Natalya Sarah, [facility], [facility]    Lab Results   Component Value Date/Time    Sodium 140 12/10/2020 03:30 AM    Potassium 3.9 12/10/2020 03:30 AM    Chloride 108 12/10/2020 03:30 AM    CO2 24 12/10/2020 03:30 AM    Anion gap 8 12/10/2020 03:30 AM    Glucose 96 12/10/2020 03:30 AM    BUN 25 (H) 12/10/2020 03:30 AM    Creatinine 1.31 (H) 12/10/2020 03:30 AM    BUN/Creatinine ratio 19 12/10/2020 03:30 AM    GFR est AA 48 (L) 12/10/2020 03:30 AM    GFR est non-AA 39 (L) 12/10/2020 03:30 AM    Calcium 8.6 12/10/2020 03:30 AM    Bilirubin, total 0.8 12/09/2020 03:48 AM    Alk. phosphatase 96 12/09/2020 03:48 AM    Protein, total 7.1 12/09/2020 03:48 AM    Albumin 2.5 (L) 12/09/2020 03:48 AM    Globulin 4.6 (H) 12/09/2020 03:48 AM    A-G Ratio 0.5 (L) 12/09/2020 03:48 AM    ALT (SGPT) 23 12/09/2020 03:48 AM           Assessment:     Assessment:     Diagnoses and all orders for this visit:    1. Chronic systolic heart failure ([address]    2. Mobitz type 2 second degree atrioventricular block    3. Hypertension, uncontrolled        ICD-10-CM ICD-9-CM    1. Chronic systolic heart failure (HCC)  I50.22 428.22    2. Mobitz type 2 second degree atrioventricular block  I44.1 426.12    3. Hypertension, uncontrolled  I10 401.9      No orders of the defined types were placed in this encounter. Plan:     1. NICM: 12/2/20 ECHO 35-40%, taken off Coreg due to Second degree HB type II resolved, MAINE scan EF 52% 12/8/20. [place] Class I  Creatinine 1.2 BUN on 12/9/20, will start Lisinopril 2.5mg daily, limited ECHO in 3 months   2. Hypertension: Controlled  3. Hx of Mobitz II while on Coreg: Back to NSR on EKG today  4.  Mild MR/TR: Starting low-dose ACE inhibitor  57-year-old female with a recent diagnosis of nonischemic cardiomyopathy 35-40% on echo 6 days later 52% on Lexiscan after treatment of her hypertension and cellulitis and pneumonia. Renal function creatinine 1.2 we will add low-dose ACE inhibitor, plan for limited echo in 90 days. History of second-degree heart block while on Coreg she is back to sinus rhythm today. Follow up 3 months   Kelly Minaya NP      Please note that this dictation was completed with CoreTrace, the [employer] 360 voice recognition software. Quite often unanticipated grammatical, syntax, homophones, and other interpretive errors are inadvertently transcribed by the computer software. Please disregard these errors. Please excuse any errors that have escaped final proofreading. Thank you.

## 2021-02-03 ENCOUNTER — OFFICE VISIT (OUTPATIENT)
Dept: CARDIOLOGY CLINIC | Age: 76
End: 2021-02-03
Payer: MEDICARE

## 2021-02-03 VITALS
TEMPERATURE: 98.1 F | BODY MASS INDEX: 22.88 KG/M2 | HEIGHT: 64 IN | RESPIRATION RATE: 18 BRPM | DIASTOLIC BLOOD PRESSURE: 81 MMHG | SYSTOLIC BLOOD PRESSURE: 134 MMHG | WEIGHT: 134 LBS | OXYGEN SATURATION: 97 % | HEART RATE: 81 BPM

## 2021-02-03 DIAGNOSIS — I10 HYPERTENSION, UNCONTROLLED: ICD-10-CM

## 2021-02-03 DIAGNOSIS — Z09 HOSPITAL DISCHARGE FOLLOW-UP: ICD-10-CM

## 2021-02-03 DIAGNOSIS — I50.22 CHRONIC SYSTOLIC HEART FAILURE (HCC): ICD-10-CM

## 2021-02-03 DIAGNOSIS — R07.9 CHEST PAIN, UNSPECIFIED TYPE: ICD-10-CM

## 2021-02-03 DIAGNOSIS — I44.1 MOBITZ TYPE 2 SECOND DEGREE ATRIOVENTRICULAR BLOCK: ICD-10-CM

## 2021-02-03 DIAGNOSIS — I42.0 DILATED CARDIOMYOPATHY (HCC): Primary | ICD-10-CM

## 2021-02-03 PROCEDURE — 99204 OFFICE O/P NEW MOD 45 MIN: CPT | Performed by: NURSE PRACTITIONER

## 2021-02-03 PROCEDURE — G8432 DEP SCR NOT DOC, RNG: HCPCS | Performed by: NURSE PRACTITIONER

## 2021-02-03 PROCEDURE — G8427 DOCREV CUR MEDS BY ELIG CLIN: HCPCS | Performed by: NURSE PRACTITIONER

## 2021-02-03 PROCEDURE — 1101F PT FALLS ASSESS-DOCD LE1/YR: CPT | Performed by: NURSE PRACTITIONER

## 2021-02-03 PROCEDURE — G8752 SYS BP LESS 140: HCPCS | Performed by: NURSE PRACTITIONER

## 2021-02-03 PROCEDURE — G8536 NO DOC ELDER MAL SCRN: HCPCS | Performed by: NURSE PRACTITIONER

## 2021-02-03 PROCEDURE — G8754 DIAS BP LESS 90: HCPCS | Performed by: NURSE PRACTITIONER

## 2021-02-03 PROCEDURE — 1090F PRES/ABSN URINE INCON ASSESS: CPT | Performed by: NURSE PRACTITIONER

## 2021-02-03 PROCEDURE — G8420 CALC BMI NORM PARAMETERS: HCPCS | Performed by: NURSE PRACTITIONER

## 2021-02-03 PROCEDURE — 1111F DSCHRG MED/CURRENT MED MERGE: CPT | Performed by: NURSE PRACTITIONER

## 2021-02-03 PROCEDURE — G8400 PT W/DXA NO RESULTS DOC: HCPCS | Performed by: NURSE PRACTITIONER

## 2021-02-03 PROCEDURE — 93000 ELECTROCARDIOGRAM COMPLETE: CPT | Performed by: NURSE PRACTITIONER

## 2021-02-03 RX ORDER — LISINOPRIL 2.5 MG/1
2.5 TABLET ORAL DAILY
Qty: 90 TAB | Refills: 3 | Status: SHIPPED | OUTPATIENT
Start: 2021-02-03 | End: 2021-04-29 | Stop reason: SDUPTHER

## 2021-02-03 NOTE — PROGRESS NOTES
Chief Complaint   Patient presents with   [facility] Follow Up     Pt c/o bilateral leg swelling. 1. Have you been to the ER, urgent care clinic since your last visit? Hospitalized since your last visit? ER, leg/feet pain. 2. Have you seen or consulted any other health care providers outside of the [address] [place] [address] since your last visit? Include any pap smears or colon screening. No      Pt has a wound on right lower leg.

## 2021-02-04 ENCOUNTER — HOSPITAL ENCOUNTER (OUTPATIENT)
Dept: WOUND CARE | Age: 76
Discharge: HOME | End: 2021-02-04
Admitting: SURGERY
Payer: MEDICARE

## 2021-02-04 VITALS
RESPIRATION RATE: 18 BRPM | HEART RATE: 65 BPM | DIASTOLIC BLOOD PRESSURE: 77 MMHG | TEMPERATURE: 97.7 F | SYSTOLIC BLOOD PRESSURE: 178 MMHG

## 2021-02-04 DIAGNOSIS — R32 URINARY INCONTINENCE, UNSPECIFIED TYPE: ICD-10-CM

## 2021-02-04 DIAGNOSIS — L89.154 PRESSURE INJURY OF SACRAL REGION, STAGE 4 (HCC): ICD-10-CM

## 2021-02-04 DIAGNOSIS — L97.912 NON-PRESSURE CHRONIC ULCER OF RIGHT LOWER LEG WITH FAT LAYER EXPOSED (HCC): ICD-10-CM

## 2021-02-04 PROBLEM — L03.90 CELLULITIS: Status: RESOLVED | Noted: 2020-12-01 | Resolved: 2021-02-04

## 2021-02-04 PROCEDURE — 99203 OFFICE O/P NEW LOW 30 MIN: CPT | Performed by: SURGERY

## 2021-02-04 PROCEDURE — 99213 OFFICE O/P EST LOW 20 MIN: CPT

## 2021-02-04 NOTE — WOUND CARE
02/04/21 1145 Wound Leg Right;Lateral;Lower #1 02/04/21 Date First Assessed/Time First Assessed: 02/04/21 1042   Wound Approximate Age at First Assessment (Weeks): 13 weeks  Primary Wound Type: Vascular  Location: Leg  Wound Location Orientation: Right;Lateral;Lower  Wound Description: #1  Date of First Ob. .. Dressing Status New dressing applied Cleansed Cleansed with saline Dressing/Treatment Honey alginate;Gauze dressing/dressing sponge;Roll gauze;Tape/Soft cloth adhesive tape Wound Sacral/coccyx #2 Date First Assessed/Time First Assessed: 02/04/21 1056   Wound Approximate Age at First Assessment (Weeks): 13 weeks  Primary Wound Type: Pressure Injury  Location: Sacral/coccyx  Wound Description: #2 Dressing Status New dressing applied Cleansed Cleansed with saline Dressing/Treatment Packing 
(packing gauze and duoderm)

## 2021-02-04 NOTE — WOUND CARE
02/04/21 1044 Wound Leg Right;Lateral;Lower #1 02/04/21 Date First Assessed/Time First Assessed: 02/04/21 1042   Wound Approximate Age at First Assessment (Weeks): 13 weeks  Primary Wound Type: Vascular  Location: Leg  Wound Location Orientation: Right;Lateral;Lower  Wound Description: #1  Date of First Ob. .. Wound Image Wound Etiology Venous Dressing Status Old drainage noted; Intact 
(Medihoney; gauze and roll gauze removed) Cleansed Cleansed with saline Wound Length (cm) 3.4 cm Wound Width (cm) 1 cm Wound Depth (cm) 0.1 cm Wound Surface Area (cm^2) 3.4 cm^2 Wound Volume (cm^3) 0.34 cm^3 Wound Assessment Pink/red;Slough Drainage Amount Moderate Drainage Description Serosanguinous Wound Odor None [place]-Wound/Incision Assessment Intact Edges Flat/open edges Wound Thickness Description Full thickness Wound Sacral/coccyx #2 Date First Assessed/Time First Assessed: 02/04/21 1056   Wound Approximate Age at First Assessment (Weeks): 13 weeks  Primary Wound Type: Pressure Injury  Location: Sacral/coccyx  Wound Description: #2 Wound Image Wound Etiology Pressure Stage 4 Dressing Status Intact; Old drainage noted (Removed foam border) Cleansed Cleansed with saline Wound Length (cm) 4.5 cm Wound Width (cm) 6 cm Wound Depth (cm) 2 cm Wound Surface Area (cm^2) 27 cm^2 Wound Volume (cm^3) 54 cm^3 Undermining Starts ___ O'Clock 12 o'clock Undermining Ends ___ O'Clock 11 o'clock Undermining Maximum Distance (cm) 1.5 cm 
(at 5 o' clock) Wound Assessment [place]/red;Slough Drainage Amount Moderate Drainage Description Serosanguinous Wound Odor None [place]-Wound/Incision Assessment Maceration Edges Epibole (rolled edges) Wound Thickness Description Full thickness Visit Vitals BP (!) 178/77 (BP Patient Position: At rest) Pulse 65 Temp 97.7 °F (36.5 °C) Resp 18 LLE Peripheral Vascular Capillary Refill: Less than/equal to 3 seconds (02/04/21 1035) Color: (brownish discoloration) (02/04/21 1035) Temperature: Cool (02/04/21 1035) Sensation: Present (02/04/21 1035) Pedal Pulse: Palpable (02/04/21 1035) Circumference of Calf (cm): 37 cm (02/04/21 1035) Location of Measurement (Calf): Mid  (02/04/21 1035) Circumference of Ankle (cm): 21 cm (02/04/21 1035) Location of Measurement (Ankle): Upper  (02/04/21 1035) RLE Peripheral Vascular Capillary Refill: Less than/equal to 3 seconds (02/04/21 1035) Color: (brownish discoloration) (02/04/21 1035) Temperature: Cool (02/04/21 1035) Sensation: Present (02/04/21 1035) Pedal Pulse: Palpable (02/04/21 1035) Circumference of Calf (cm): 37.5 cm (02/04/21 1035) Location of Measurement (Calf): Mid  (02/04/21 1035) Circumference of Ankle (cm): 21 cm (02/04/21 1035) Location of Measurement (Ankle): Upper  (02/04/21 1035)

## 2021-02-04 NOTE — WOUND CARE
02/04/21 1044 Wound Leg Right;Lateral;Lower #1 02/04/21 Date First Assessed/Time First Assessed: 02/04/21 1042   Wound Approximate Age at First Assessment (Weeks): 13 weeks  Primary Wound Type: Vascular  Location: Leg  Wound Location Orientation: Right;Lateral;Lower  Wound Description: #1  Date of First Ob. .. Wound Image Wound Etiology Venous Dressing Status Old drainage noted; Intact 
(Medihoney; gauze and roll gauze removed) Cleansed Cleansed with saline Wound Length (cm) 3.4 cm Wound Width (cm) 1 cm Wound Depth (cm) 0.1 cm Wound Surface Area (cm^2) 3.4 cm^2 Wound Volume (cm^3) 0.34 cm^3 Wound Assessment Pink/red;Slough Drainage Amount Moderate Drainage Description Serosanguinous Wound Odor None [place]-Wound/Incision Assessment Intact Edges Flat/open edges Wound Thickness Description Full thickness Visit Vitals BP (!) 178/77 (BP Patient Position: At rest) Pulse 65 Temp 97.7 °F (36.5 °C) Resp 18 LLE Peripheral Vascular Capillary Refill: Less than/equal to 3 seconds (02/04/21 1035) Color: (brownish discoloration) (02/04/21 1035) Temperature: Cool (02/04/21 1035) Sensation: Present (02/04/21 1035) Pedal Pulse: Palpable (02/04/21 1035) Circumference of Calf (cm): 37 cm (02/04/21 1035) Location of Measurement (Calf): Mid  (02/04/21 1035) Circumference of Ankle (cm): 21 cm (02/04/21 1035) Location of Measurement (Ankle): Upper  (02/04/21 1035) RLE Peripheral Vascular Capillary Refill: Less than/equal to 3 seconds (02/04/21 1035) Color: (brownish discoloration) (02/04/21 1035) Temperature: Cool (02/04/21 1035) Sensation: Present (02/04/21 1035) Pedal Pulse: Palpable (02/04/21 1035) Circumference of Calf (cm): 37.5 cm (02/04/21 1035) Location of Measurement (Calf): Mid  (02/04/21 1035) Circumference of Ankle (cm): 21 cm (02/04/21 1035) Location of Measurement (Ankle): Upper  (02/04/21 1035)

## 2021-02-04 NOTE — DISCHARGE INSTRUCTIONS
Discharge Instructions/Wound [facility]  [address] [facility]  [place], [address] [facility]  Telephone: [phone]     FAX [phone]  WOUND CARE ORDERS:  Right lower leg - cleanse with saline, apply Medihoney hydrogel sheet, cover with gauze, securen with roll gauze, secure with tape on dressing only. Change dressing 3 x week. Sacral wound - cleanse with saline, pack with 1/4 plain packing gauze, cover with duoderm. Change 3 x week and prn soiling. Follow-up in clinic in one week. TREATMENT ORDERS:  Turn/reposition every 2 hours when in bed, avoid direct pressure on wound site. When sitting, shift position or do seat lifts every 15 minutes. Limit side lying to 30 degree tilt. Limit HOB elevation to 30 degrees. Use speciality pressure relief cushion, mattress as appropriate. Follow diet as prescribed:  [x] Diet as tolerated: [] Calorie Diabetic Diet:Low carb and no Sugar [] No Added Salt:[x] Increase Protein: [] Other:Limit the amount of liquid you are drinking and avoid drinking in between meals              Return Appointment:  [x] Return Appointment: With DR Cristal Rogers  in  [address] [place] [address])  [] Ordered tests:    Electronically signed Enrique Sebastian RN on 2/4/2021 at 11:32 AM     Lida Houser 281: Should you experience any significant changes in your wound(s) or have questions about your wound care, please contact the [address] [place] [address] at [address] [place] [address] 8:00 am - 4:30. If you need help with your wound outside these hours and cannot wait until we are again available, contact your PCP or go to the hospital emergency room. PLEASE NOTE: IF YOU ARE UNABLE TO OBTAIN WOUND SUPPLIES, CONTINUE TO USE THE SUPPLIES YOU HAVE AVAILABLE UNTIL YOU ARE ABLE TO REACH US. IT IS MOST IMPORTANT TO KEEP THE WOUND COVERED AT ALL TIMES.      Physician Signature:_______________________    Date: ___________ Time:  ____________

## 2021-02-11 ENCOUNTER — HOSPITAL ENCOUNTER (OUTPATIENT)
Dept: WOUND CARE | Age: 76
Discharge: HOME | End: 2021-02-11
Admitting: SURGERY
Payer: MEDICARE

## 2021-02-11 VITALS
TEMPERATURE: 96.6 F | RESPIRATION RATE: 16 BRPM | SYSTOLIC BLOOD PRESSURE: 180 MMHG | HEART RATE: 88 BPM | DIASTOLIC BLOOD PRESSURE: 75 MMHG

## 2021-02-11 DIAGNOSIS — L89.154 PRESSURE INJURY OF SACRAL REGION, STAGE 4 (HCC): ICD-10-CM

## 2021-02-11 DIAGNOSIS — L97.912 NON-PRESSURE CHRONIC ULCER OF RIGHT LOWER LEG WITH FAT LAYER EXPOSED (HCC): ICD-10-CM

## 2021-02-11 PROCEDURE — 99213 OFFICE O/P EST LOW 20 MIN: CPT | Performed by: SURGERY

## 2021-02-11 PROCEDURE — 99213 OFFICE O/P EST LOW 20 MIN: CPT

## 2021-02-11 NOTE — WOUND CARE
02/11/21 1038 RLE Peripheral Vascular Capillary Refill Less than/equal to 3 seconds Color Appropriate for race Temperature Warm Sensation Present Pedal Pulse Palpable Circumference of Calf (cm) 39 cm Location of Measurement (Calf) Mid   
Circumference of Ankle (cm) 21 cm Location of Measurement (Ankle) Upper Wound Leg Right;Lateral;Lower #1 02/04/21 Date First Assessed/Time First Assessed: 02/04/21 1042   Wound Approximate Age at First Assessment (Weeks): 13 weeks  Primary Wound Type: Vascular  Location: Leg  Wound Location Orientation: Right;Lateral;Lower  Wound Description: #1  Date of First Ob. .. Wound Image Wound Etiology Venous Dressing Status Old drainage noted Cleansed Cleansed with saline Dressing/Treatment  
(Medihoney hydrogel sheet, Gauze, RG) Wound Length (cm) 3 cm Wound Width (cm) 1.1 cm Wound Depth (cm) 0.1 cm Wound Surface Area (cm^2) 3.3 cm^2 Change in Wound Size % 2.94 Wound Volume (cm^3) 0.33 cm^3 Wound Healing % 3 Wound Assessment [place]/red;Slough Drainage Amount Moderate Drainage Description Serosanguinous Wound Odor None [place]-Wound/Incision Assessment Intact Edges Flat/open edges Wound Thickness Description Full thickness Wound Sacral/coccyx #2 Date First Assessed/Time First Assessed: 02/04/21 1056   Wound Approximate Age at First Assessment (Weeks): 13 weeks  Primary Wound Type: Pressure Injury  Location: Sacral/coccyx  Wound Description: #2 Wound Image Wound Etiology Pressure Stage 4 Dressing Status Old drainage noted Cleansed Cleansed with saline Dressing/Treatment (Packing, border foam) Wound Length (cm) 2.5 cm Wound Width (cm) 5.5 cm Wound Depth (cm) 1.4 cm Wound Surface Area (cm^2) 13.75 cm^2 Change in Wound Size % 49.07 Wound Volume (cm^3) 19.25 cm^3 Wound Healing % 64 Undermining Starts ___ O'Clock 6 o'clock Undermining Ends ___ O'Clock 12 o'clock Undermining Maximum Distance (cm) 1.4 cm Wound Assessment [employer]/red;Slough Drainage Amount Moderate Drainage Description Serosanguinous Wound Odor None [place]-Wound/Incision Assessment Maceration Edges Flat/open edges Wound Thickness Description Full thickness Visit Vitals BP (!) 180/75 (BP 1 Location: Right upper arm, BP Patient Position: At rest;Lying left side) Pulse 88 Temp (!) 96.6 °F (35.9 °C) Resp 16

## 2021-02-11 NOTE — DISCHARGE INSTRUCTIONS
Discharge Instructions/Wound [address]  [address]  [place], [address] [facility] [address]  Telephone: [phone]     FAX [phone]  WOUND CARE ORDERS:    Right lower leg - cleanse with saline, apply Medihoney hydrogel sheet, cover with gauze, secure with roll gauze, secure with tape on dressing only. Apply single layer tubigrip size E,  Change dressing 3 x week. Sacral wound - cleanse with saline, pack with 1/4 iodoform packing gauze, cover with ABD & exudry and secure with tape. Change 3 x week and prn soiling. Follow-up in clinic in 2 weeks. TREATMENT ORDERS:  Turn/reposition every 2 hours when in bed, avoid direct pressure on wound site. When sitting, shift position or do seat lifts every 15 minutes. Limit side lying to 30 degree tilt. Limit HOB elevation to 30 degrees. Use speciality pressure relief cushion, mattress as appropriate. Follow diet as prescribed:  [x] Diet as tolerated: [] Calorie Diabetic Diet:Low carb and no Sugar [] No Added Salt:[x] Increase Protein: [] Other:Limit the amount of liquid you are drinking and avoid drinking in between meals              Return Appointment:  [x] Return Appointment: With DR Mati Molina  in  [address] [facility])  [] Ordered tests:    Electronically signed Nayla Salinas RN on 2/11/2021 at 11:23 AM     [address] [facility] Information: Should you experience any significant changes in your wound(s) or have questions about your wound care, please contact the [address] [place] [address] at [address] [place] [address] 8:00 am - 4:30. If you need help with your wound outside these hours and cannot wait until we are again available, contact your PCP or go to the hospital emergency room. PLEASE NOTE: IF YOU ARE UNABLE TO OBTAIN WOUND SUPPLIES, CONTINUE TO USE THE SUPPLIES YOU HAVE AVAILABLE UNTIL YOU ARE ABLE TO REACH US. IT IS MOST IMPORTANT TO KEEP THE WOUND COVERED AT ALL TIMES.      Physician Signature:_______________________    Date: ___________ Time:  ____________

## 2021-02-11 NOTE — PROGRESS NOTES
HISTORY OF PRESENT ILLNESS:  The patient is a 73-year-old woman who is referred to the [address] [place] [address] Road regarding ulceration in right leg and sacral ulcer. The patient had been hospitalized late last year at [facility] for treatment of lower extremity cellulitis. She had been hospitalized at [facility] from 12/01/2020 to 12/10/2020 because of hypertension out of control, new onset cardiomyopathy with ejection fraction of 35%-40%, lower extremity cellulitis, acute kidney injury, and pneumonia on chest x-ray.     The patient after discharge went to a rehabilitation facility and has been out of rehabilitation facility now for about two weeks. She has had home health care with home health nurses. The patient was first seen at the [address] [place] [address] on 2/4/2021. The patient has history of prediabetes. She is reported to have had improvement in ejection fraction to 50% after treatment of her hypertension. The patient can ambulate short distances with a walker inside her house. Her appetite is improving. She is not short of breath with limited activities that she carries out. She is not short of breath lying down in her bed at night. She has no anginal chest pain. She was felt to have nonischemic cardiomyopathy.     The patient has no history of smoking. Previous dressing of packing and foam had to be changed 1-2 times per day because of incontinence. This is not proved practical.     The patient's last recorded weight was 134 pounds with height 5 feet 4 inches on 02/03/2021.     PHYSICAL EXAMINATION:  GENERAL:  The patient is an alert woman in no acute distress.     EXTREMITIES:  Examination of the lower extremities on the left revealed 2+ dorsalis pedis pulse. There was trace pitting edema in left lower extremity from the knee distally. No ulcers were present in the left lower extremity.     Examination of the right lower extremity revealed 2+ dorsalis pedis pulse.   There was trace pitting edema in the right lower extremity. There was on the anterior mid lower leg on the right an ulcer which is 3 x 1.1  x 0.1 cm in size and had granulation tissue with minimal slough.     Examination of the sacral region revealed an ulcer with the deepest portion being in the midline presacral region down to the level of bone. Bone is palpable in the wound, although it may be covered by a thin layer of granulation. The opening into this deeper area was approximately 12 mm wide. There was slight undermining about 1.5 cm towards 11 o'clock to 1 o'clock. There was no visible bone in this wound. There was then in a butterfly distribution two wounds, which may be partial-thickness skin loss one on each buttock right and left. The total size of this wound complex was 2.5 x 5.5 cm.     The patient presently has been using Medihoney alginate sheet dressings for the leg wound. The wound appears clean and this dressing will be continued with home health nurses three times per week.     I previously had a long discussion with the patient regarding urinary continence issues. She at sometimes has trouble making it to bathroom in time and also at night or wake up in the morning with wet adult diaper. The patient was instructed to change her cloths whenever she had incontinence. She was instructed to avoid drinking any fluid after 06:00 p.m. to try to reduce quantitate urinary incontinence at night. The patient's family was also informed of these concerns and its effect on wound healing. The patient's daughter was present at the visit and the patient's son with whom the patient lives also was informed of our concerns.      I discussed with the patient and family the need for her to at this time never lie down directly supine. She should lie on right side, left side, or prone. She can sit straight upright. She is reported to tend to slouch when sitting.   The family has a U-shaped sacral cushion to try to minimize pressure directly on the sacral site when she is attempting to sit. Also, using pillows to tilt her substantially off midline was discussed.     The patient presently has only Medicare and is applying for Medicaid. One she has full insurance coverage, then I think consideration should be given to obtaining hospital bed and low air loss alternating pressure mattress in view of her stage IV sacral ulcer. Dressing ordered: For the sacral wound will consist of 1/4-inch iodoform packing into the deep portion of the wound, then Aquacell AG covering the entire wound complex, covered by ABD and tape.   Packing and dressing to be changed by family daily and prn.     The patient will follow up in [address] [place] [address] in two weeks.     FINAL DIAGNOSES:  Stage IV sacral pressure ulcer, ulcer in right lower leg with fat layer exposed, urinary incontinence, congestive heart failure.      H22.436, Y07.282,      Francisco Pozo MD

## 2021-02-25 ENCOUNTER — HOSPITAL ENCOUNTER (OUTPATIENT)
Dept: WOUND CARE | Age: 76
Discharge: HOME | End: 2021-02-25
Admitting: NURSE PRACTITIONER
Payer: MEDICARE

## 2021-02-25 VITALS
DIASTOLIC BLOOD PRESSURE: 77 MMHG | SYSTOLIC BLOOD PRESSURE: 184 MMHG | RESPIRATION RATE: 16 BRPM | TEMPERATURE: 97.3 F | HEART RATE: 85 BPM

## 2021-02-25 DIAGNOSIS — L89.154 PRESSURE INJURY OF SACRAL REGION, STAGE 4 (HCC): ICD-10-CM

## 2021-02-25 DIAGNOSIS — L97.912 NON-PRESSURE CHRONIC ULCER OF RIGHT LOWER LEG WITH FAT LAYER EXPOSED (HCC): ICD-10-CM

## 2021-02-25 PROCEDURE — 99213 OFFICE O/P EST LOW 20 MIN: CPT | Performed by: SURGERY

## 2021-02-25 PROCEDURE — 99213 OFFICE O/P EST LOW 20 MIN: CPT

## 2021-02-25 RX ORDER — WHEAT DEXTRIN 3 G/3.5 G
1 POWDER IN PACKET (EA) ORAL
COMMUNITY
End: 2022-02-09

## 2021-02-25 NOTE — WOUND CARE
02/25/21 1038   RLE Peripheral Vascular    Capillary Refill Less than/equal to 3 seconds   Color Appropriate for race   Temperature Warm   Sensation Present   Pedal Pulse Palpable   Circumference of Calf (cm) 35 cm   Location of Measurement (Calf) Mid    Circumference of Ankle (cm) 20 cm   Location of Measurement (Ankle) Upper    Wound Leg Right;Lateral;Lower #1 02/04/21   Date First Assessed/Time First Assessed: 02/04/21 1042   Wound Approximate Age at First Assessment (Weeks): 13 weeks  Primary Wound Type: Vascular  Location: Leg  Wound Location Orientation: Right;Lateral;Lower  Wound Description: #1  Date of First Ob. ..    Wound Image    Wound Etiology Venous   Dressing Status Old drainage noted   Cleansed Cleansed with saline   Dressing/Treatment   (Medihoney hydrogel sheet, G, RG)   Wound Length (cm) 3 cm   Wound Width (cm) 1.1 cm   Wound Depth (cm) 0.1 cm   Wound Surface Area (cm^2) 3.3 cm^2   Change in Wound Size % 2.94   Wound Volume (cm^3) 0.33 cm^3   Wound Healing % 3   Wound Assessment [place]/red;Slough   Drainage Amount Moderate   Drainage Description Serosanguinous   Wound Odor None   [place]-Wound/Incision Assessment Intact   Edges Flat/open edges   Wound Thickness Description Full thickness   Wound Sacral/coccyx #2   Date First Assessed/Time First Assessed: 02/04/21 1056   Wound Approximate Age at First Assessment (Weeks): 13 weeks  Primary Wound Type: Pressure Injury  Location: Sacral/coccyx  Wound Description: #2   Wound Image    Wound Etiology Pressure Stage 4   Dressing Status Old drainage noted   Cleansed Cleansed with saline   Dressing/Treatment   (Plain packing gauze, ABD, tape)   Wound Length (cm) 2 cm   Wound Width (cm) 1 cm   Wound Depth (cm) 2 cm   Wound Surface Area (cm^2) 2 cm^2   Change in Wound Size % 92.59   Wound Volume (cm^3) 4 cm^3   Wound Healing % 93   Wound Assessment [place]/red;Slough   Drainage Amount Moderate   Drainage Description Serosanguinous   Wound Odor None [place]-Wound/Incision Assessment Maceration   Edges Flat/open edges   Wound Thickness Description Full thickness     Visit Vitals  BP (!) 184/77 (BP 1 Location: Right upper arm, BP Patient Position: At rest;Supine)   Pulse 85   Temp 97.3 °F (36.3 °C)   Resp 16

## 2021-02-25 NOTE — DISCHARGE INSTRUCTIONS
Discharge Instructions/Wound [address] [facility]  [address] [facility]  [place], [address] [facility]  Telephone: [phone]      [phone] ORDERS:Right lower leg - Cleanse with saline or mild soap and water. Pat dry w/gauze. Cover with Duoderm-Thin,  Followed by Single layer tubigrip size E (from base of toes to just under knee)   PCG/HHC to change dressing 3 x week. Sacral wound - Cleanse with saline, lightly pack with Aquacel AG rope or Strip of Aquacel AG, cover with ABD & exudry and secure with tape. PCG/HHC to change daily and as needed for compromise of dressing. Gavin Echols RTC for MD follow-up in 2 weeks. TREATMENT ORDERS:  Turn/reposition a minimum of every 2 hours when in bed, avoid direct pressure on wound site. When sitting, shift position or do seat lifts every 15 minutes. Limit sitting to 30 - 40 minutes at a time no more than 2 times a day. Limit side lying to 30 degree tilt. Limit HOB elevation to 30 degrees. Use speciality pressure relief cushion, mattress as appropriate. Follow diet as prescribed:  [x] Diet as tolerated: [] Calorie Diabetic Diet: [] No Added Salt:  [x] Increase Protein: [] Other:                Return Appointment:  [x] Return Appointment: With Dr. Jovita Diaz In [address] [facility])  [] Ordered tests:      Electronically signed Augusta Sauceda RN on 2/25/2021 at 11:09 AM     Lida Houser 281: Should you experience any significant changes in your wound(s) or have questions about your wound care, please contact the [address] [place] [address] at [address] [place] [address] 8:00 am - 4:30. If you need help with your wound outside these hours and cannot wait until we are again available, contact your PCP or go to the hospital emergency room. PLEASE NOTE: IF YOU ARE UNABLE TO OBTAIN WOUND SUPPLIES, CONTINUE TO USE THE SUPPLIES YOU HAVE AVAILABLE UNTIL YOU ARE ABLE TO REACH US.  IT IS MOST IMPORTANT TO KEEP THE WOUND COVERED AT ALL TIMES.      Physician Signature:_______________________    Date: ___________ Time:  ____________

## 2021-02-25 NOTE — PROGRESS NOTES
HISTORY OF PRESENT ILLNESS:  The patient is a 24-year-old woman who is referred to the [address] [place] [address] Road regarding ulceration in right leg and sacral ulcer.  The patient had been hospitalized late last year at [facility] for treatment of lower extremity cellulitis.  She had been hospitalized at [facility] from 12/01/2020 to 12/10/2020 because of hypertension out of control, new onset cardiomyopathy with ejection fraction of 35%-40%, lower extremity cellulitis, acute kidney injury, and pneumonia on chest x-ray.     The patient after discharge went to a rehabilitation facility and has been out of rehabilitation facility now for about two weeks. Zoey Pavon has had home health care with home health nurses.     The patient was first seen at the [address] [place] [address] on 2/4/2021.     The patient has history of prediabetes. Zoey aPvon is reported to have had improvement in ejection fraction to 50% after treatment of her hypertension.  The patient can ambulate short distances with a walker inside her house.  Her appetite is improving.  She is not short of breath with limited activities that she carries out. Zoey Pavon is not short of breath lying down in her bed at night.  She has no anginal chest pain.  She was felt to have nonischemic cardiomyopathy.     The patient has no history of smoking.     Previous dressing of packing and foam had to be changed 1-2 times per day because of incontinence. This is not proved practical.    Current dressing: For the sacral wound will consist of 1/4-inch iodoform packing into the deep portion of the wound, then Aquacell AG covering the entire wound complex, covered by ABD and tape. Packing and dressing to be changed by family daily and prn.     Current dressing for leg:  Medihoney alginate sheet dressings for the leg wound 3 times per week.     The patient's last recorded weight was 134 pounds with height 5 feet 4 inches on 02/03/2021.     PHYSICAL EXAMINATION:  GENERAL:  The patient is an alert woman in no acute distress.     EXTREMITIES:  Examination of the lower extremities on the left revealed 2+ dorsalis pedis pulse.  There was trace pitting edema in left lower extremity from the knee distally.  No ulcers were present in the left lower extremity.     Examination of the right lower extremity revealed 2+ dorsalis pedis pulse.  There was trace pitting edema in the right lower extremity.  There was on the anterior mid lower leg on the right an ulcer which is 3 x 1.1  x 0.1 cm in size mostly as dry scab.     Examination of the sacral region revealed an ulcer with the deepest portion being in the midline presacral region down to the level of bone.  Bone is palpable in the wound but appears to be covered by a thin layer of granulation.  The opening into this deeper area was approximately 8 mm wide.  There was slight undermining about 1.5 cm towards 11 o'clock to 1 o'clock.  There was no visible bone in this wound.   The total size of this wound complex was 2 x 1 x 2 cm.     Dressing ordered for leg wound:  Duoderm on wound, single Tubigrip; change 3 times per week with Home Health.     Urinary incontinence issues discussed.      I discussed with the patient and family the need for her to at this time never lie down directly supine.  She should lie on right side, left side, or prone.  She can sit straight upright.  She is reported to tend to slouch when sitting.  The family has a U-shaped sacral cushion to try to minimize pressure directly on the sacral site when she is attempting to sit.  Also, using pillows to tilt her substantially off midline was discussed.     She sits on a sofa with U shaped cushion; will limit to 30 minutes at a time 3 times per day.     The patient presently has only Medicare and is applying for Medicaid.  One she has full insurance coverage, then I think consideration should be given to obtaining hospital bed and low air loss alternating pressure mattress in view of her stage IV sacral ulcer.     Dressing ordered: For the sacral wound will consist of Aquacell AG (or Aquacell rope) packed into wound, covered by ABD and tape. Packing and dressing to be changed by family daily and prn.   Home Health will change 3 times per week.     The patient will follow up in [address] [place] [address] in two weeks.     FINAL DIAGNOSES:  Stage IV sacral pressure ulcer, ulcer in right lower leg with fat layer exposed, urinary incontinence, congestive heart failure.      S21.169, K37.878     Benigno Brooks MD

## 2021-03-03 RX ORDER — CLONIDINE HYDROCHLORIDE 0.2 MG/1
TABLET ORAL
Qty: 90 TAB | Refills: 0 | Status: SHIPPED | OUTPATIENT
Start: 2021-03-03 | End: 2021-04-07

## 2021-03-03 RX ORDER — AMLODIPINE BESYLATE 10 MG/1
TABLET ORAL
Qty: 30 TAB | Refills: 0 | Status: SHIPPED | OUTPATIENT
Start: 2021-03-03 | End: 2021-04-07

## 2021-03-11 ENCOUNTER — HOSPITAL ENCOUNTER (OUTPATIENT)
Dept: WOUND CARE | Age: 76
Discharge: HOME | End: 2021-03-11
Admitting: SURGERY
Payer: MEDICARE

## 2021-03-11 VITALS
HEART RATE: 110 BPM | DIASTOLIC BLOOD PRESSURE: 77 MMHG | SYSTOLIC BLOOD PRESSURE: 129 MMHG | TEMPERATURE: 98.7 F | RESPIRATION RATE: 16 BRPM

## 2021-03-11 DIAGNOSIS — L97.912 NON-PRESSURE CHRONIC ULCER OF RIGHT LOWER LEG WITH FAT LAYER EXPOSED (HCC): ICD-10-CM

## 2021-03-11 DIAGNOSIS — L89.154 PRESSURE INJURY OF SACRAL REGION, STAGE 4 (HCC): ICD-10-CM

## 2021-03-11 PROCEDURE — 17250 CHEM CAUT OF GRANLTJ TISSUE: CPT

## 2021-03-11 PROCEDURE — 99213 OFFICE O/P EST LOW 20 MIN: CPT | Performed by: SURGERY

## 2021-03-11 NOTE — DISCHARGE INSTRUCTIONS
Discharge Instructions/Wound [facility]  [address]  [place], [address]  Telephone: [phone]     FAX [phone]  WOUND CARE ORDERS:  Right lower leg - Cleanse with saline or mild soap and water. Pat dry w/gauze. Cover with Duoderm-Thin,  Followed by Single layer tubigrip size E (from base of toes to just under knee)   PCG/HHC to change dressing 3 x week. Sacral wound - cleanse with saline, pack with Aquacel AG rope, cover with ABD & exudry and secure with tape. PCG/HHC to daily and as needed,  prn soiling. RTC for MD follow-up in 2 weeks. TREATMENT ORDERS:  Turn/reposition every 2 hours when in bed, avoid direct pressure on wound site. When sitting, shift position or do seat lifts every 15 minutes. Limit side lying to 30 degree tilt. Limit HOB elevation to 30 degrees. Use speciality pressure relief cushion, mattress as appropriate. Follow diet as prescribed:  [x] Diet as tolerated: [] Calorie Diabetic Diet:Low carb and no Sugar [] No Added Salt:[x] Increase Protein: [] Other:Limit the amount of liquid you are drinking and avoid drinking in between meals              Return Appointment:  [x] Return Appointment: With DR Amna Jacob  in  [facility])  [] Ordered tests:    Electronically signed Eddy Hoover RN on 3/11/2021 at 10:46 AM     [address] [facility] [address] Information: Should you experience any significant changes in your wound(s) or have questions about your wound care, please contact the [address] [place] [address] at [address] [place] [address] 8:00 am - 4:30. If you need help with your wound outside these hours and cannot wait until we are again available, contact your PCP or go to the hospital emergency room. PLEASE NOTE: IF YOU ARE UNABLE TO OBTAIN WOUND SUPPLIES, CONTINUE TO USE THE SUPPLIES YOU HAVE AVAILABLE UNTIL YOU ARE ABLE TO REACH US. IT IS MOST IMPORTANT TO KEEP THE WOUND COVERED AT ALL TIMES.      Physician Signature:_______________________    Date: ___________ Time:  ____________

## 2021-03-11 NOTE — WOUND CARE
03/11/21 1018 Wound Leg Right;Lateral;Lower #1 02/04/21 Date First Assessed/Time First Assessed: 02/04/21 1042   Wound Approximate Age at First Assessment (Weeks): 13 weeks  Primary Wound Type: Vascular  Location: Leg  Wound Location Orientation: Right;Lateral;Lower  Wound Description: #1  Date of First Ob. .. Wound Image Wound Etiology Venous Dressing Status Old drainage noted Cleansed Cleansed with saline Dressing/Treatment  
(Medihoney hydrogel sheet, gauze, tape) Wound Length (cm) 0.6 cm Wound Width (cm) 1 cm Wound Depth (cm) 0.1 cm Wound Surface Area (cm^2) 0.6 cm^2 Change in Wound Size % 82.35 Wound Volume (cm^3) 0.06 cm^3 Wound Healing % 82 Wound Assessment Pink/red Drainage Amount Moderate Drainage Description Serosanguinous Wound Odor None Ju[place]-Wound/Incision Assessment Intact Edges Flat/open edges Wound Thickness Description Full thickness Wound Sacral/coccyx #2 Date First Assessed/Time First Assessed: 02/04/21 1056   Wound Approximate Age at First Assessment (Weeks): 13 weeks  Primary Wound Type: Pressure Injury  Location: Sacral/coccyx  Wound Description: #2 Wound Image Wound Etiology Pressure Stage 4 Dressing Status Old drainage noted Cleansed Cleansed with saline Dressing/Treatment (Packing gauze, optilock, ABD, tape) Wound Length (cm) 1 cm Wound Width (cm) 0.3 cm Wound Depth (cm) 1.6 cm Wound Surface Area (cm^2) 0.3 cm^2 Change in Wound Size % 98.89 Wound Volume (cm^3) 0.48 cm^3 Wound Healing % 99 Wound Assessment Pink/red Drainage Amount Moderate Drainage Description Serosanguinous Wound Odor None Ju[place]-Wound/Incision Assessment Maceration Edges Flat/open edges Wound Thickness Description Full thickness Visit Vitals /77 (BP 1 Location: Right upper arm, BP Patient Position: At rest;Sitting) Pulse (!) 110 Temp 98.7 °F (37.1 °C) Resp 16

## 2021-03-11 NOTE — PROGRESS NOTES
HISTORY OF PRESENT ILLNESS:  The patient is a 75-year-old woman who is referred to the [facility] regarding ulceration in right leg and sacral ulcer.  The patient had been hospitalized late last year at [facility] for treatment of lower extremity cellulitis.  She had been hospitalized at [facility] from 12/01/2020 to 12/10/2020 because of hypertension out of control, new onset cardiomyopathy with ejection fraction of 35%-40%, lower extremity cellulitis, acute kidney injury, and pneumonia on chest x-ray.     The patient after discharge went to a rehabilitation facility for a time.  She has had home health care with home health nurses.  She is ambulatory.  She reports good po intake.     The patient was first seen at the [facility] on 2/4/2021.     The patient has history of prediabetes.  She is reported to have had improvement in ejection fraction to 50% after treatment of her hypertension.  The patient can ambulate short distances with a walker inside her house.  Her appetite is improving.  She is not short of breath with limited activities that she carries out.  She is not short of breath lying down in her bed at night.  She has no anginal chest pain.  She was felt to have nonischemic cardiomyopathy.     The patient has no history of smoking.     Previous dressing of packing and foam had to be changed 1-2 times per day because of incontinence.  This is not proved practical.     Prior dressing:  For the sacral wound  -  1/4-inch iodoform packing into the deep portion of the wound, then Aquacell AG covering the entire wound complex, covered by ABD and tape.  Packing and dressing to be changed by family daily and prn.     Current dressing for leg:  Duoderm dressings for the leg wound 3 times per week.    Current dressing:   For the sacral wound will consist of Aquacell AG (or Aquacell rope) packed into wound, covered by ABD and tape.  Packing and dressing to be changed by family daily and prn.   Home Health will change 3 times per week.     The patient's last recorded weight was 134 pounds with height 5 feet 4 inches on 02/03/2021.     PHYSICAL EXAMINATION:  GENERAL:  The patient is an alert woman in no acute distress. She appears stronger.     EXTREMITIES:  Examination of the lower extremities on the left revealed 2+ dorsalis pedis pulse.  There was trace pitting edema in left lower extremity from the knee distally.  No ulcers were present in the left lower extremity.     Examination of the right lower extremity revealed 2+ dorsalis pedis pulse.  There was trace pitting edema in the right lower extremity.  There was on the anterior mid lower leg on the right an ulcer which is 0.6 x 1 x 0.1 cm in size with granulation. Silver nitrate applied to exuberant granulation.     Examination of the sacral region revealed an ulcer with the deepest portion being in the midline presacral region, dimensions 1 x 0.3 x 1.6 with clean granulation. No palpable or visible bone.       All wounds improved.        I discussed with the patient and family the need for her to at this time never lie down directly supine.  She should lie on right side, left side, or prone.  She can sit straight upright.  She is reported to tend to slouch when sitting.  The family has a U-shaped sacral cushion to try to minimize pressure directly on the sacral site when she is attempting to sit.  Also, using pillows to tilt her substantially off midline was discussed.     She sits on a sofa with U shaped cushion.     The patient presently has only Medicare and is applying for Medicaid.   Her good improvement in the wounds and better mobility suggests she does not need alternating pressure mattress. Dressing ordered:  For the sacral wound will consist of Aquacell AG (or Aquacell rope) packed into wound, covered by ABD and tape.  Packing and dressing to be changed by family daily and prn. Home Health will change 3 times per week.     Dressing ordered for leg wound:  Duoderm on wound, single Tubigrip; change 3 times per week with Home Health.     The patient will follow up in [address] [place] [address] in two weeks.     FINAL DIAGNOSES:  Stage IV sacral pressure ulcer, ulcer in right lower leg with fat layer exposed, urinary incontinence, congestive heart failure.      M37.012, A04.770     Anu Howell MD

## 2021-03-25 ENCOUNTER — HOSPITAL ENCOUNTER (OUTPATIENT)
Dept: WOUND CARE | Age: 76
Discharge: HOME | End: 2021-03-25
Admitting: SURGERY
Payer: MEDICARE

## 2021-03-25 VITALS
SYSTOLIC BLOOD PRESSURE: 177 MMHG | DIASTOLIC BLOOD PRESSURE: 79 MMHG | RESPIRATION RATE: 16 BRPM | TEMPERATURE: 98.9 F | HEART RATE: 83 BPM

## 2021-03-25 DIAGNOSIS — L97.912 NON-PRESSURE CHRONIC ULCER OF RIGHT LOWER LEG WITH FAT LAYER EXPOSED (HCC): ICD-10-CM

## 2021-03-25 DIAGNOSIS — L89.154 PRESSURE INJURY OF SACRAL REGION, STAGE 4 (HCC): ICD-10-CM

## 2021-03-25 PROCEDURE — 99213 OFFICE O/P EST LOW 20 MIN: CPT | Performed by: SURGERY

## 2021-03-25 PROCEDURE — 99213 OFFICE O/P EST LOW 20 MIN: CPT

## 2021-03-25 NOTE — WOUND CARE
03/25/21 1105 Anesthetic Anesthetic 4% Lidocaine Liquid Topical 
(gel) Right Leg Edema Point of Measurement Compression Therapy  
(single tubi) RLE Peripheral Vascular Capillary Refill Less than/equal to 3 seconds Color Appropriate for race Temperature Warm Sensation Present Pedal Pulse Palpable Circumference of Calf (cm) 34 cm Location of Measurement (Calf) Mid   
Circumference of Ankle (cm) 21 cm Location of Measurement (Ankle) Upper Wound Leg Right;Lateral;Lower #1 02/04/21 Date First Assessed/Time First Assessed: 02/04/21 1042   Wound Approximate Age at First Assessment (Weeks): 13 weeks  Primary Wound Type: Vascular  Location: Leg  Wound Location Orientation: Right;Lateral;Lower  Wound Description: #1  Date of First Ob. .. Wound Image Wound Etiology Venous Dressing Status  
(removed duoderm) Cleansed Cleansed with saline Wound Length (cm) 1 cm Wound Width (cm) 0.7 cm Wound Depth (cm) 0.1 cm Wound Surface Area (cm^2) 0.7 cm^2 Change in Wound Size % 79.41 Wound Volume (cm^3) 0.07 cm^3 Wound Healing % 79 Wound Assessment Pink/red;Epithelialization Drainage Amount Small Drainage Description Serous Wound Odor None Juana-Wound/Incision Assessment Intact Edges Flat/open edges Wound Thickness Description Full thickness Wound Sacral/coccyx #2 Date First Assessed/Time First Assessed: 02/04/21 1056   Wound Approximate Age at First Assessment (Weeks): 13 weeks  Primary Wound Type: Pressure Injury  Location: Sacral/coccyx  Wound Description: #2 Wound Image Wound Etiology Pressure Stage 4 Dressing Status  
(packing ,abd pad, tape) Wound Length (cm) 0.6 cm Wound Width (cm) 0.3 cm Wound Depth (cm) 1.4 cm Wound Surface Area (cm^2) 0.18 cm^2 Change in Wound Size % 99.33 Wound Volume (cm^3) 0.25 cm^3 Wound Healing % 100 Wound Assessment Pink/red Drainage Amount Moderate Drainage Description Serosanguinous Wound Odor None Juana-Wound/Incision Assessment Maceration Edges Flat/open edges Wound Thickness Description Full thickness Pain 1 Pain Scale 1 Numeric (0 - 10) Pain Intensity 1 0 Patient Stated Pain Goal 0 Pain Reassessment 1 Yes Visit Vitals BP (!) 177/79 Pulse 83 Temp 98.9 °F (37.2 °C) Resp 16

## 2021-03-25 NOTE — WOUND CARE
03/25/21 1146 Wound Leg Right;Lateral;Lower #1 02/04/21 Date First Assessed/Time First Assessed: 02/04/21 1042   Wound Approximate Age at First Assessment (Weeks): 13 weeks  Primary Wound Type: Vascular  Location: Leg  Wound Location Orientation: Right;Lateral;Lower  Wound Description: #1  Date of First Ob. .. Dressing Status New dressing applied Dressing/Treatment  
(single tubi) Wound Sacral/coccyx #2 Date First Assessed/Time First Assessed: 02/04/21 1056   Wound Approximate Age at First Assessment (Weeks): 13 weeks  Primary Wound Type: Pressure Injury  Location: Sacral/coccyx  Wound Description: #2 Dressing Status New dressing applied Cleansed Cleansed with saline Dressing/Treatment Alginate with Ag;ABD pad;Tape/Soft cloth adhesive tape

## 2021-03-25 NOTE — DISCHARGE INSTRUCTIONS
Discharge Instructions/Wound [facility]  [address] [facility]  [place], [address]  Telephone: [phone]     FAX [phone]  WOUND CARE ORDERS:  Right lower leg - Cleanse with saline or mild soap and water. Pat dry w/gauze. Leave the area Open to Air, Single layer tubigrip size E (from base of toes to just under knee)  May take tubi  at night. Sacral wound - cleanse with saline, pat it dry, pack with Aquacel AG rope, cover with ABD & exudry and secure with tape. PCG/HHC to daily and as needed,  prn soiling. RTC for MD follow-up in 3 weeks    TREATMENT ORDERS:  Turn/reposition every 2 hours when in bed, avoid direct pressure on wound site. When sitting, shift position or do seat lifts every 15 minutes. Limit side lying to 30 degree tilt. Limit HOB elevation to 30 degrees. Use speciality pressure relief cushion, mattress as appropriate. Follow diet as prescribed:  [x] Diet as tolerated: [] Calorie Diabetic Diet:Low carb and no Sugar [x] No Added Salt:[] Increase Protein: [] Other:Limit the amount of liquid you are drinking and avoid drinking in between meals              Return Appointment:  [x] Return Appointment: With DR Manning Living  in  [address] [place] [address])  [] Ordered tests:    Electronically signed Chinedu Teresa RN on 3/25/2021 at 11:26 AM     Lida Houser 281: Should you experience any significant changes in your wound(s) or have questions about your wound care, please contact the [address] [place] [address] at [address] [place] [address] 8:00 am - 4:30. If you need help with your wound outside these hours and cannot wait until we are again available, contact your PCP or go to the hospital emergency room. PLEASE NOTE: IF YOU ARE UNABLE TO OBTAIN WOUND SUPPLIES, CONTINUE TO USE THE SUPPLIES YOU HAVE AVAILABLE UNTIL YOU ARE ABLE TO REACH US. IT IS MOST IMPORTANT TO KEEP THE WOUND COVERED AT ALL TIMES.      Physician Signature:_______________________    Date: ___________ Time:  ____________

## 2021-03-25 NOTE — PROGRESS NOTES
HISTORY OF PRESENT ILLNESS:  The patient is a 41-year-old woman who is referred to the [address] [place] [address] Road regarding ulceration in right leg and sacral ulcer.  The patient had been hospitalized late last year at [facility] for treatment of lower extremity cellulitis.  She had been hospitalized at [facility] from 12/01/2020 to 12/10/2020 because of hypertension out of control, new onset cardiomyopathy with ejection fraction of 35%-40%, lower extremity cellulitis, acute kidney injury, and pneumonia on chest x-ray.     The patient after discharge went to a rehabilitation facility for a time. Sheryl Terrell has had home health care with home health nurses. She is ambulatory. She reports good po intake.     The patient was first seen at [facility] on 2/4/2021.     The patient has history of prediabetes. Sheryl Terrell is reported to have had improvement in ejection fraction to 50% after treatment of her hypertension.  The patient can ambulate short distances with a walker inside her house.  Her appetite is improving. Sheryl Terrell is not short of breath with limited activities that she carries out. Sheryl Terrell is not short of breath lying down in her bed at night.  She has no anginal chest pain.  She was felt to have nonischemic cardiomyopathy.     The patient has no history of smoking.     Previous dressing of packing and foam had to be changed 1-2 times per day because of incontinence.  This is not proved practical.     Prior dressing:  For the sacral wound  -  1/4-inch iodoform packing into the deep portion of the wound, then Aquacell AG covering the entire wound complex, covered by ABD and tape.  Packing and dressing to be changed by family daily and prn.     Current dressing for leg:  Duoderm dressings for the leg wound 3 times per week.     Current dressing:    For the sacral wound will consist of Aquacell AG (or Aquacell rope) packed into wound, covered by ABD and tape.  Packing and dressing to be changed by family daily and prn.  Home Health will change 3 times per week.     The patient's last recorded weight was 134 pounds with height 5 feet 4 inches on 02/03/2021.     PHYSICAL EXAMINATION:  GENERAL:  The patient is an alert woman in no acute distress. She appears stronger.     EXTREMITIES:  Examination of the lower extremities on the left revealed 2+ dorsalis pedis pulse.  There was trace pitting edema in left lower extremity from the knee distally.  No ulcers were present in the left lower extremity.     Examination of the right lower extremity revealed 2+ dorsalis pedis pulse.  There was trace pitting edema in the right lower extremity.  Anterior ulcer site is healed.     Examination of the sacral region revealed an ulcer with the deepest portion being in the midline presacral region, dimensions 0.6 x 0.3 x 1.4 with clean granulation. No palpable or visible bone.        Leg wound healed.   Sacral wound improved.         I discussed with the patient and family the need for her to at this time never lie down directly supine.  She should lie on right side, left side, or prone.  She can sit straight upright.  She is reported to tend to slouch when sitting.  The family has a U-shaped sacral cushion to try to minimize pressure directly on the sacral site when she is attempting to sit.  Also, using pillows to tilt her substantially off midline was discussed.     She sits on a sofa with U shaped cushion.     The patient presently has only Medicare and is applying for Medicaid.   Her good improvement in the wounds and better mobility suggests she does not need alternating pressure mattress.     Dressing ordered:  For the sacral wound will consist of Aquacell AG (or Aquacell rope) packed into wound, covered by ABD and tape.  Packing and dressing to be changed by family daily and prn.  Home Health will change 3 times per week.     The patient will follow up in [address] [place] [address] in 3 weeks.     FINAL DIAGNOSES:  Stage IV sacral pressure ulcer, ulcer in right lower leg with fat layer exposed (healed), urinary incontinence, congestive heart failure.      M66.108, K28.836     Anu Howell MD

## 2021-04-07 RX ORDER — AMLODIPINE BESYLATE 10 MG/1
TABLET ORAL
Qty: 30 TAB | Refills: 0 | Status: SHIPPED | OUTPATIENT
Start: 2021-04-07 | End: 2021-04-29 | Stop reason: SDUPTHER

## 2021-04-07 RX ORDER — CLONIDINE HYDROCHLORIDE 0.2 MG/1
TABLET ORAL
Qty: 90 TAB | Refills: 0 | Status: SHIPPED | OUTPATIENT
Start: 2021-04-07 | End: 2021-04-29 | Stop reason: SDUPTHER

## 2021-04-09 RX ORDER — ASPIRIN 81 MG/1
TABLET ORAL
Qty: 30 TAB | Refills: 0 | Status: SHIPPED | OUTPATIENT
Start: 2021-04-09 | End: 2021-05-10 | Stop reason: SDUPTHER

## 2021-04-15 ENCOUNTER — HOSPITAL ENCOUNTER (OUTPATIENT)
Dept: WOUND CARE | Age: 76
Discharge: HOME | End: 2021-04-15
Admitting: SURGERY
Payer: MEDICARE

## 2021-04-15 VITALS
DIASTOLIC BLOOD PRESSURE: 85 MMHG | SYSTOLIC BLOOD PRESSURE: 186 MMHG | TEMPERATURE: 97.4 F | RESPIRATION RATE: 16 BRPM | HEART RATE: 88 BPM

## 2021-04-15 DIAGNOSIS — L89.154 PRESSURE INJURY OF SACRAL REGION, STAGE 4 (HCC): ICD-10-CM

## 2021-04-15 PROBLEM — L97.912 NON-PRESSURE CHRONIC ULCER OF RIGHT LOWER LEG WITH FAT LAYER EXPOSED (HCC): Status: RESOLVED | Noted: 2021-02-04 | Resolved: 2021-04-15

## 2021-04-15 PROCEDURE — 99213 OFFICE O/P EST LOW 20 MIN: CPT

## 2021-04-15 PROCEDURE — 99213 OFFICE O/P EST LOW 20 MIN: CPT | Performed by: SURGERY

## 2021-04-15 NOTE — DISCHARGE INSTRUCTIONS
Discharge Instructions/Wound [facility]  [address] [facility]  [place], [address]  Telephone: [phone]     FAX [phone]  WOUND CARE ORDERS:    Right lower leg - Cleanse with saline or mild soap and water. Pat dry w/gauze. Leave the area Open to Air, Single layer tubigrip size E (from base of toes to just under knee)  May take tubi  at night. Sacral wound - cleanse with saline, pack with Aquacel AG rope, cover with  Silicone bordered super absorbent pad ( bordered foam used in clinic today. ). PCG/HHC to daily and as needed,  prn soiling. RTC for MD follow-up in 3 weeks    TREATMENT ORDERS:  Turn/reposition every 2 hours when in bed, avoid direct pressure on wound site. When sitting, shift position or do seat lifts every 15 minutes. Limit side lying to 30 degree tilt. Limit HOB elevation to 30 degrees. Use speciality pressure relief cushion, mattress as appropriate. Follow diet as prescribed:  [] Diet as tolerated: [x] Calorie Diabetic Diet:Low carb and no Sugar [] No Added Salt:[] Increase Protein: [] Other:Limit the amount of liquid you are drinking and avoid drinking in between meals              Return Appointment:  [x] Return Appointment: With DR Radha Gates  in  [facility])  [] Ordered tests:    Electronically signed Lorri Kelley on 4/15/2021 at [address] [place] [address]: Should you experience any significant changes in your wound(s) or have questions about your wound care, please contact the [address] [place] [address] at [address] [place] [address] 8:00 am - 4:30. If you need help with your wound outside these hours and cannot wait until we are again available, contact your PCP or go to the hospital emergency room. PLEASE NOTE: IF YOU ARE UNABLE TO OBTAIN WOUND SUPPLIES, CONTINUE TO USE THE SUPPLIES YOU HAVE AVAILABLE UNTIL YOU ARE ABLE TO REACH US. IT IS MOST IMPORTANT TO KEEP THE WOUND COVERED AT ALL TIMES.      Physician Signature:_______________________    Date: ___________ Time:  ____________

## 2021-04-15 NOTE — PROGRESS NOTES
HISTORY OF PRESENT ILLNESS:  The patient is a 59-year-old woman who is referred to the [address] [place] [address] Road regarding ulceration in right leg and sacral ulcer.  The patient had been hospitalized late last year at [facility] for treatment of lower extremity cellulitis.  She had been hospitalized at [facility] from 12/01/2020 to 12/10/2020 because of hypertension out of control, new onset cardiomyopathy with ejection fraction of 35%-40%, lower extremity cellulitis, acute kidney injury, and pneumonia on chest x-ray.     The patient after discharge went to a rehabilitation facility for a time.  She has had home health care with home health nurses.  She is ambulatory.  She reports good po intake.     The patient was first seen at [facility] on 2/4/2021.     The patient has history of prediabetes. Fitz Wong is reported to have had improvement in ejection fraction to 50% after treatment of her hypertension.  The patient can ambulate short distances with a walker inside her house. Fitz Wong has had falls. Her appetite is improving.  She is not short of breath with limited activities that she carries out. Fitz Wong is not short of breath lying down in her bed at night.  She has no anginal chest pain.  She was felt to have nonischemic cardiomyopathy.     She has incontinence at night.     The patient has no history of smoking.     Previous dressing of packing and foam had to be changed 1-2 times per day because of incontinence.  This has not proved practical.     Prior dressing:  For the sacral wound  -  1/4-inch iodoform packing into the deep portion of the wound, then Aquacell AG covering the entire wound complex, covered by ABD and tape.  Packing and dressing to be changed by family daily and prn.     Current dressing:   Dressing for the sacral wound will consist of Aquacell AG (or Aquacell rope) packed into wound, covered by ABD.  Packing and dressing to be changed by family daily and prn.  Home Health will change 3 times per week. She complained of pain in all toes and distal feet.     The patient's last recorded weight was 134 pounds with height 5 feet 4 inches on 02/03/2021.     PHYSICAL EXAMINATION:  GENERAL:  The patient is an alert woman in no acute distress.  She appears stronger.     EXTREMITIES:  Examination of the lower extremities on the left revealed 2+ dorsalis pedis pulse.  There was trace pitting edema in left lower extremity from the knee distally.  No ulcers were present in the left lower extremity.     Examination of the right lower extremity revealed 2+ dorsalis pedis pulse.  There was trace pitting edema in the right lower extremity. No ulcer.     Examination of the sacral region revealed an ulcer with the deepest portion being in the midline presacral region, dimensions 0.5 x 0.3 x 2.5 pointing inferiorly with clean granulation.  No palpable or visible bone.        Leg wound healed. Sacral wound persisting. Concerns about impact of nocturnal incontinence.         I have discussed with the patient and family the need for her to at this time never lie down directly supine.  She should lie on right side, left side, or prone.  She can sit straight upright.  She is reported to tend to slouch when sitting.  The family has a U-shaped sacral cushion to try to minimize pressure directly on the sacral site when she is attempting to sit.  Also, using pillows to tilt her substantially off midline was discussed.     She sits on a sofa with U shaped cushion.     The patient presently has only Medicare and is applying for Medicaid.   Her good improvement in the wounds and better mobility suggests she does not need alternating pressure mattress.     Dressing ordered:  For the sacral wound will consist of Aquacell AG (or Aquacell rope) packed into wound, covered by bordered super absorbant pad.  Packing and dressing to be changed by family daily and prn.  Home Health will change 3 times per week.     Strong palpable DP pulses in feet suggests ischemia is not source of distal foot pain. Can see podiatrist as needed.   She is already on pregabalin.     The patient will follow up in [address] [place] [address] in 3 weeks.     FINAL DIAGNOSES:  Stage IV sacral pressure ulcer, ulcer in right lower leg with fat layer exposed (healed), urinary incontinence, congestive heart failure.      O10.725     Sheldon Damon MD

## 2021-04-15 NOTE — WOUND CARE
04/15/21 1121 Right Leg Edema Point of Measurement Compression Therapy Tubular elastic support bandage (Single) Wound Sacral/coccyx #2 Date First Assessed/Time First Assessed: 02/04/21 1056   Wound Approximate Age at First Assessment (Weeks): 13 weeks  Primary Wound Type: Pressure Injury  Location: Sacral/coccyx  Wound Description: #2 Dressing Status New dressing applied Cleansed Cleansed with saline Dressing/Treatment Packing;Alginate with Ag 
(border foam)

## 2021-04-15 NOTE — WOUND CARE
04/15/21 1100 Wound Sacral/coccyx #2 Date First Assessed/Time First Assessed: 02/04/21 1056   Wound Approximate Age at First Assessment (Weeks): 13 weeks  Primary Wound Type: Pressure Injury  Location: Sacral/coccyx  Wound Description: #2 Wound Image Wound Etiology Pressure Stage 4 Dressing Status Old drainage noted Cleansed Cleansed with saline Dressing/Treatment  
(Alginate, ABD, tape) Wound Length (cm) 0.5 cm Wound Width (cm) 0.3 cm Wound Depth (cm) 1.2 cm Wound Surface Area (cm^2) 0.15 cm^2 Change in Wound Size % 99.44 Wound Volume (cm^3) 0.18 cm^3 Wound Healing % 100 Undermining Starts ___ O'Clock 1 o'clock Undermining Ends ___ O'Clock 2 o'clock Undermining Maximum Distance (cm) 1.1 cm Wound Assessment Pink/red Drainage Amount Moderate Drainage Description Serosanguinous Wound Odor None [place]-Wound/Incision Assessment Maceration Edges Flat/open edges Wound Thickness Description Full thickness Visit Vitals BP (!) 186/85 (BP 1 Location: Right upper arm, BP Patient Position: At rest;Supine) Pulse 88 Temp 97.4 °F (36.3 °C) Resp 16

## 2021-04-26 ENCOUNTER — HOSPITAL ENCOUNTER (OUTPATIENT)
Dept: NON INVASIVE DIAGNOSTICS | Age: 76
Discharge: HOME | End: 2021-04-26
Payer: MEDICARE

## 2021-04-26 DIAGNOSIS — I50.22 CHRONIC SYSTOLIC HEART FAILURE (HCC): ICD-10-CM

## 2021-04-26 DIAGNOSIS — Z09 HOSPITAL DISCHARGE FOLLOW-UP: ICD-10-CM

## 2021-04-26 DIAGNOSIS — I42.0 DILATED CARDIOMYOPATHY (HCC): ICD-10-CM

## 2021-04-26 DIAGNOSIS — I10 HYPERTENSION, UNCONTROLLED: ICD-10-CM

## 2021-04-26 DIAGNOSIS — I44.1 MOBITZ TYPE 2 SECOND DEGREE ATRIOVENTRICULAR BLOCK: ICD-10-CM

## 2021-04-26 DIAGNOSIS — R07.9 CHEST PAIN, UNSPECIFIED TYPE: ICD-10-CM

## 2021-04-26 LAB
ECHO AO ROOT DIAM: 2.36 CM
ECHO AV AREA PLAN: 2.32 CM2
ECHO LA MAJOR AXIS: 2.51 CM
ECHO LV INTERNAL DIMENSION DIASTOLIC: 4.66 CM (ref 3.9–5.3)
ECHO LV INTERNAL DIMENSION SYSTOLIC: 3.06 CM
ECHO LV IVSD: 1.07 CM (ref 0.6–0.9)
ECHO LV MASS 2D: 161.1 G (ref 67–162)
ECHO LV POSTERIOR WALL DIASTOLIC: 0.92 CM (ref 0.6–0.9)
ECHO LVOT DIAM: 1.63 CM
ECHO MV AREA PLAN: 5.06 CM2

## 2021-04-26 PROCEDURE — 93308 TTE F-UP OR LMTD: CPT

## 2021-04-26 NOTE — PROGRESS NOTES
Q: Please call patient, echocardiogram shows the pumping strength of her heart as return to completely normal.  Continue current medications.

## 2021-04-27 NOTE — PROGRESS NOTES
Placed call to pt. Two pt identifiers confirmed. Pt informed that per NP Renzo Narvaez shows the pumping strength of her heart as return to completely normal.  Continue current medications. \"  Pt verbalized understanding of information discussed w/ no further questions at this time.

## 2021-04-29 ENCOUNTER — OFFICE VISIT (OUTPATIENT)
Dept: INTERNAL MEDICINE CLINIC | Age: 76
End: 2021-04-29
Payer: MEDICARE

## 2021-04-29 VITALS
TEMPERATURE: 98.2 F | SYSTOLIC BLOOD PRESSURE: 158 MMHG | BODY MASS INDEX: 23.22 KG/M2 | DIASTOLIC BLOOD PRESSURE: 81 MMHG | OXYGEN SATURATION: 100 % | HEIGHT: 64 IN | HEART RATE: 97 BPM | RESPIRATION RATE: 16 BRPM | WEIGHT: 136 LBS

## 2021-04-29 DIAGNOSIS — Z13.820 OSTEOPOROSIS SCREENING: ICD-10-CM

## 2021-04-29 DIAGNOSIS — G62.9 NEUROPATHY: ICD-10-CM

## 2021-04-29 DIAGNOSIS — Z00.00 MEDICARE ANNUAL WELLNESS VISIT, SUBSEQUENT: Primary | ICD-10-CM

## 2021-04-29 DIAGNOSIS — I10 ESSENTIAL HYPERTENSION: ICD-10-CM

## 2021-04-29 DIAGNOSIS — Z78.0 POST-MENOPAUSAL: ICD-10-CM

## 2021-04-29 DIAGNOSIS — R73.09 ELEVATED GLUCOSE: ICD-10-CM

## 2021-04-29 PROCEDURE — G8420 CALC BMI NORM PARAMETERS: HCPCS | Performed by: NURSE PRACTITIONER

## 2021-04-29 PROCEDURE — 99214 OFFICE O/P EST MOD 30 MIN: CPT | Performed by: NURSE PRACTITIONER

## 2021-04-29 PROCEDURE — G8754 DIAS BP LESS 90: HCPCS | Performed by: NURSE PRACTITIONER

## 2021-04-29 PROCEDURE — 1101F PT FALLS ASSESS-DOCD LE1/YR: CPT | Performed by: NURSE PRACTITIONER

## 2021-04-29 PROCEDURE — G8400 PT W/DXA NO RESULTS DOC: HCPCS | Performed by: NURSE PRACTITIONER

## 2021-04-29 PROCEDURE — G8536 NO DOC ELDER MAL SCRN: HCPCS | Performed by: NURSE PRACTITIONER

## 2021-04-29 PROCEDURE — G8753 SYS BP > OR = 140: HCPCS | Performed by: NURSE PRACTITIONER

## 2021-04-29 PROCEDURE — G8432 DEP SCR NOT DOC, RNG: HCPCS | Performed by: NURSE PRACTITIONER

## 2021-04-29 PROCEDURE — 1090F PRES/ABSN URINE INCON ASSESS: CPT | Performed by: NURSE PRACTITIONER

## 2021-04-29 PROCEDURE — G0439 PPPS, SUBSEQ VISIT: HCPCS | Performed by: NURSE PRACTITIONER

## 2021-04-29 PROCEDURE — G8427 DOCREV CUR MEDS BY ELIG CLIN: HCPCS | Performed by: NURSE PRACTITIONER

## 2021-04-29 RX ORDER — LISINOPRIL 2.5 MG/1
2.5 TABLET ORAL DAILY
Qty: 90 TAB | Refills: 3 | Status: SHIPPED | OUTPATIENT
Start: 2021-04-29 | End: 2021-11-08 | Stop reason: SDUPTHER

## 2021-04-29 RX ORDER — CLONIDINE HYDROCHLORIDE 0.2 MG/1
TABLET ORAL
Qty: 90 TAB | Refills: 0 | Status: SHIPPED | OUTPATIENT
Start: 2021-04-29 | End: 2021-06-11

## 2021-04-29 RX ORDER — AMLODIPINE BESYLATE 10 MG/1
10 TABLET ORAL DAILY
Qty: 90 TAB | Refills: 0 | Status: SHIPPED | OUTPATIENT
Start: 2021-04-29 | End: 2021-07-14

## 2021-04-29 RX ORDER — ACETAMINOPHEN 325 MG/1
325 TABLET ORAL
Qty: 60 TAB | Refills: 1 | Status: SHIPPED | OUTPATIENT
Start: 2021-04-29 | End: 2021-05-27

## 2021-04-29 RX ORDER — POLYETHYLENE GLYCOL 3350 17 G/17G
17 POWDER, FOR SOLUTION ORAL DAILY
Qty: 30 PACKET | Refills: 1 | Status: SHIPPED | OUTPATIENT
Start: 2021-04-29 | End: 2022-10-10

## 2021-04-29 RX ORDER — PREGABALIN 50 MG/1
50 CAPSULE ORAL 2 TIMES DAILY
Qty: 60 CAP | Refills: 2 | Status: SHIPPED | OUTPATIENT
Start: 2021-04-29 | End: 2021-06-21

## 2021-04-29 NOTE — Clinical Note
Please let her know I spoke w/ cardiology and they agree to resume hydralazine for BP- I have sent this to her pharm

## 2021-04-29 NOTE — PROGRESS NOTES
Chief Complaint   Patient presents with    Follow-up     3 month    Numbness     pt reports numbness in both feet and states feet feel cold x 1 month        1. Have you been to the ER, urgent care clinic since your last visit? Hospitalized since your last visit? No    2. Have you seen or consulted any other health care providers outside of the [address] [place] [address] since your last visit? Include any pap smears or colon screening.  No

## 2021-04-29 NOTE — PROGRESS NOTES
Subjective: (As above and below)     Chief Complaint   Patient presents with    Follow-up     3 month    Numbness     pt reports numbness in both feet and states feet feel cold x 1 month      Ronna Ibanez is a 66y.o. year old female who presents for     Hypertension ROS:  taking medications as instructed, no medication side effects noted, no TIAs, no chest pain on exertion, no dyspnea on exertion, no swelling of ankles  Per cardio notes she was to continue hydralazine but she does not have this and it is not on med list      BP Readings from Last 3 Encounters:   04/29/21 (!) 158/81   04/15/21 (!) 186/85   03/25/21 (!) 177/79       Wt Readings from Last 3 Encounters:   04/29/21 136 lb (61.7 kg)   02/03/21 134 lb (60.8 kg)   01/28/21 140 lb (63.5 kg)         Cardiomyopathy: followed by cardiology    Neuropathy:  To bilateral feet, no low back/sciatica s/s  She has a hx of R foot ulcer which is healed, she is still seeing wound care for sacral ulcer now  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   Family History   Problem Relation Age of Onset    Diabetes Mother     Diabetes Father     Diabetes Son        Past Medical History:   Diagnosis Date    Cardiomyopathy ([facility] 12/3/2020    HFrEF (heart failure with reduced ejection fraction) ([facility] 12/3/2020    Hypertension, uncontrolled 12/3/2020    Mobitz type 2 second degree atrioventricular block 01/1/7497    Systolic heart failure ([facility] 12/3/2020      Social History     Socioeconomic History    Marital status: SINGLE     Spouse name: Not on file    Number of children: 2    Years of education: Not on file    Highest education level: Not on file   Tobacco Use    Smoking status: Never Smoker    Smokeless tobacco: Never Used   Substance and Sexual Activity    Alcohol use: Never     Frequency: Never    Drug use: Never    Sexual activity: Not Currently     Partners: Male   Other Topics Concern          Current Outpatient Medications   Medication Sig    hydrALAZINE (APRESOLINE) 25 mg tablet Take 1 Tab by mouth two (2) times a day.  acetaminophen (TylenoL) 325 mg tablet Take 1 Tab by mouth every four (4) hours as needed for Pain.  amLODIPine (NORVASC) 10 mg tablet Take 1 Tab by mouth daily.  cloNIDine HCL (CATAPRES) 0.2 mg tablet TAKE 1 TABLET BY MOUTH THREE TIMES DAILY    lisinopriL (PRINIVIL, ZESTRIL) 2.5 mg tablet Take 1 Tab by mouth daily.  polyethylene glycol (Miralax) 17 gram packet Take 1 Packet by mouth daily. constipation    pregabalin (Lyrica) 50 mg capsule Take 1 Cap by mouth two (2) times a day. Max Daily Amount: 100 mg.  aspirin delayed-release 81 mg tablet TAKE 1 TABLET BY MOUTH ONCE DAILY.  Wheat Dextrin (Benefiber Clear) 3 gram/3.5 gram pwpk Take 1 Package by mouth daily as needed.  ammonium lactate (LAC-HYDRIN) 12 % lotion Apply 1 Each to affected area as needed (dry skin). rub in to affected area well     No current facility-administered medications for this visit. Review of Systems:   Constitutional:    Negative for fever and chills, negative diaphoresis. HEENT:              Negative for neck pain and stiffness. Eyes:                  Negative for visual disturbance, itching, redness or discharge. Respiratory:        Negative for cough and shortness of breath. Cardiovascular:  Negative for chest pain and palpitations. Gastrointestinal: Negative for nausea, vomiting, abdominal pain, diarrhea or constipation. Genitourinary:     Negative for dysuria and frequency. Musculoskeletal: Negative for falls, tenderness and swelling. Skin:                    Negative for rash, masses or lesions. Neurological:       Negative for dizzyness, seizure, loss of consciousness, weakness and numbness.      Objective:     Vitals:    04/29/21 0939   BP: (!) 158/81   Pulse: 97   Resp: 16   Temp: 98.2 °F (36.8 °C)   TempSrc: Temporal   SpO2: 100%   Weight: 136 lb (61.7 kg)   Height: 5' 4\" (1.626 m)         Gen: Oriented to person, place and time and well-developed, well-nourished and in no distress. HEENT:    Head: normocephalic and atraumatic. Eyes:  EOM are normal. Pupils equal and round. Neck:  Normal range of motion. Neck supple. Cardiovascular: normal rate, regular rhythm and normal heart sounds. Pulmonary/Chest:  Effort normal and breath sounds normal.  No respiratory distress. No wheezes, no rales. Abdominal: soft, normal  bowel sounds. Musculoskeletal:  No edema, no tenderness. No calf tenderness or edema. Neurological:  Alert, oriented to person, place and time. Skin: skin is warm and dry. She has reduced sensation w/ monofilament to both feet, she has +2 pedal pulses bilat  No edema or calf swelling        Assessment/ Plan:     Follow-up and Dispositions    · Return in about 3 months (around 7/29/2021) for bp. Routing History          1. Medicare annual wellness visit, subsequent      2. Essential hypertension  Conferred w/ cardio resume hydralazine at low dose  - METABOLIC PANEL, BASIC; Future  - CBC W/O DIFF; Future    3. Neuropathy  recc d/w wound care as he is vascular, may recc repeat CARISSA  - pregabalin (Lyrica) 50 mg capsule; Take 1 Cap by mouth two (2) times a day. Max Daily Amount: 100 mg. Dispense: 60 Cap; Refill: 2    4. Elevated glucose    - HEMOGLOBIN A1C WITH EAG; Future    5. Post-menopausal    - DEXA BONE DENSITY STUDY AXIAL; Future    6. Osteoporosis screening    - DEXA BONE DENSITY STUDY AXIAL; Future        I have discussed the diagnosis with the patient and the intended plan as seen in the above orders. The patient has received an after-visit summary and questions were answered concerning future plans. Pt conveyed understanding of plan.       Medication Side Effects and Warnings were discussed with patient: yes  Patient Labs were reviewed: yes  Patient Past Records were reviewed:  yes    Jenna Abarca. Nieves Vila NP     This is the Subsequent Medicare Annual Wellness Exam, performed 12 months or more after the Initial AWV or the last Subsequent AWV    I have reviewed the patient's medical history in detail and updated the computerized patient record. Assessment/Plan   Education and counseling provided:  Are appropriate based on today's review and evaluation    1. Medicare annual wellness visit, subsequent  2. Essential hypertension  -     METABOLIC PANEL, BASIC; Future  -     CBC W/O DIFF; Future  3. Neuropathy  -     pregabalin (Lyrica) 50 mg capsule; Take 1 Cap by mouth two (2) times a day. Max Daily Amount: 100 mg., Normal, Disp-60 Cap, R-2  4. Elevated glucose  -     HEMOGLOBIN A1C WITH EAG; Future  5. Post-menopausal  -     DEXA BONE DENSITY STUDY AXIAL; Future  6. Osteoporosis screening  -     DEXA BONE DENSITY STUDY AXIAL;  Future       Depression Risk Factor Screening     3 most recent PHQ Screens 1/28/2021   Little interest or pleasure in doing things Nearly every day   Feeling down, depressed, irritable, or hopeless Not at all   Total Score PHQ 2 3   Trouble falling or staying asleep, or sleeping too much Not at all   Feeling tired or having little energy Several days   Poor appetite, weight loss, or overeating Nearly every day   Feeling bad about yourself - or that you are a failure or have let yourself or your family down Not at all   Trouble concentrating on things such as school, work, reading, or watching TV Not at all   Moving or speaking so slowly that other people could have noticed; or the opposite being so fidgety that others notice Not at all   Thoughts of being better off dead, or hurting yourself in some way Not at all   PHQ 9 Score 7   How difficult have these problems made it for you to do your work, take care of your home and get along with others Somewhat difficult       Alcohol Risk Screen    Do you average more than 1 drink per night or more than 7 drinks a week:  No    On any one occasion in the past three months have you have had more than 3 drinks containing alcohol:  No        Functional Ability and Level of Safety    Hearing: Hearing is good. Activities of Daily Living: The home contains: no safety equipment. Patient does total self care      Ambulation: with no difficulty     Fall Risk:  Fall Risk Assessment, last 12 mths 1/28/2021   Able to walk? Yes   Fall in past 12 months? 1   Do you feel unsteady? 1   Are you worried about falling 1   Is the gait abnormal? 1   Number of falls in past 12 months 1   Fall with injury?  1      Abuse Screen:  Patient is not abused       Cognitive Screening    Has your family/caregiver stated any concerns about your memory: no     Cognitive Screening: Normal - based on H&P    Health Maintenance Due     Health Maintenance Due   Topic Date Due    Hepatitis C Screening  Never done    COVID-19 Vaccine (1) Never done    DTaP/Tdap/Td series (1 - Tdap) Never done    Shingrix Vaccine Age 50> (1 of 2) Never done    Bone Densitometry (Dexa) Screening  Never done    Pneumococcal 65+ years (1 of 1 - PPSV23) Never done    Medicare Yearly Exam  Never done       Patient Care Team   Patient Care Team:  Kiet Velázquez, NP as PCP - General (Nurse Practitioner)  Kiet Velázquez NP [facility] PCP - [facility]    History     Patient Active Problem List   Diagnosis Code    Cardiomyopathy ([address] Q78.5    Systolic heart failure ([address] I50.20    HFrEF (heart failure with reduced ejection fraction) ([address] I50.20    Hypertension, uncontrolled I10    Mobitz type 2 second degree atrioventricular block I44.1    Pressure injury of sacral region, stage 4 ([address] L89.154    Urinary incontinence R32     Past Medical History:   Diagnosis Date    Cardiomyopathy ([address] 12/3/2020    HFrEF (heart failure with reduced ejection fraction) ([address] 12/3/2020    Hypertension, uncontrolled 12/3/2020    Mobitz type 2 second degree atrioventricular block 55/1/8492    Systolic heart failure ([facility] 12/3/2020      History reviewed. No pertinent surgical history. Current Outpatient Medications   Medication Sig Dispense Refill    hydrALAZINE (APRESOLINE) 25 mg tablet Take 1 Tab by mouth two (2) times a day. 60 Tab 1    acetaminophen (TylenoL) 325 mg tablet Take 1 Tab by mouth every four (4) hours as needed for Pain. 60 Tab 1    amLODIPine (NORVASC) 10 mg tablet Take 1 Tab by mouth daily. 90 Tab 0    cloNIDine HCL (CATAPRES) 0.2 mg tablet TAKE 1 TABLET BY MOUTH THREE TIMES DAILY 90 Tab 0    lisinopriL (PRINIVIL, ZESTRIL) 2.5 mg tablet Take 1 Tab by mouth daily. 90 Tab 3    polyethylene glycol (Miralax) 17 gram packet Take 1 Packet by mouth daily. constipation 30 Packet 1    pregabalin (Lyrica) 50 mg capsule Take 1 Cap by mouth two (2) times a day. Max Daily Amount: 100 mg. 60 Cap 2    aspirin delayed-release 81 mg tablet TAKE 1 TABLET BY MOUTH ONCE DAILY. 30 Tab 0    Wheat Dextrin (Benefiber Clear) 3 gram/3.5 gram pwpk Take 1 Package by mouth daily as needed.  ammonium lactate (LAC-HYDRIN) 12 % lotion Apply 1 Each to affected area as needed (dry skin). rub in to affected area well 1 Bottle 1     Allergies   Allergen Reactions    Seafood Hives    Drug Ingredient [Cetirizine] Unknown (comments)     Mistaken entry       Family History   Problem Relation Age of Onset    Diabetes Mother     Diabetes Father     Diabetes Son      Social History     Tobacco Use    Smoking status: Never Smoker    Smokeless tobacco: Never Used   Substance Use Topics    Alcohol use: Never     Frequency: Never         Keya Bailey.  Sarah Clemens NP

## 2021-04-30 ENCOUNTER — TELEPHONE (OUTPATIENT)
Dept: INTERNAL MEDICINE CLINIC | Age: 76
End: 2021-04-30

## 2021-04-30 RX ORDER — HYDRALAZINE HYDROCHLORIDE 25 MG/1
25 TABLET, FILM COATED ORAL 2 TIMES DAILY
Qty: 60 TAB | Refills: 1 | Status: SHIPPED | OUTPATIENT
Start: 2021-04-30 | End: 2021-04-30

## 2021-04-30 RX ORDER — HYDRALAZINE HYDROCHLORIDE 25 MG/1
TABLET, FILM COATED ORAL
Qty: 180 TAB | Refills: 0 | Status: SHIPPED | OUTPATIENT
Start: 2021-04-30 | End: 2021-07-29

## 2021-04-30 NOTE — TELEPHONE ENCOUNTER
LVM for call back, need to inform pt provider spoke w/ cardio and he said it is okay for pt to resume hydralazine for BP, med has been sent

## 2021-04-30 NOTE — TELEPHONE ENCOUNTER
Informed Ms. Kirstin Vieira of provider's response and rx sent, Ms. Kirstin Vieira verbalized understanding.

## 2021-05-04 NOTE — PROGRESS NOTES
[facility] @ [address] [place], [place]  Subjective/HPI:     Tish Martini is a 66 y.o. female with a history of nonischemic cardiomyopathy in the setting of hypertension/cellulitis pneumonia sepsis. Initially EF 30-35% has recovered 60 to 65% on echo 4/26/2021 with being on low-dose ACE inhibitor, carvedilol had been discontinued due to second-degree heart block type II development. She presents feeling well today denies dyspnea on exertion or chest pain. Has recently been placed on hydralazine 25 mg twice a day for hypertension. She started her first dose last night. 2/3/21  1. NICM: 12/2/20 ECHO 35-40%, taken off Coreg due to Second degree HB type II resolved, MAINE scan EF 52% 12/8/20. [facility] Class I  Creatinine 1.2 BUN on 12/9/20, will start Lisinopril 2.5mg daily, limited ECHO in 3 months   2. Hypertension: Controlled  3. Hx of Mobitz II while on Coreg: Back to NSR on EKG today  4. Mild MR/TR: Starting low-dose ACE inhibitor  78-year-old female with a recent diagnosis of nonischemic cardiomyopathy 35-40% on echo 6 days later 52% on Lexiscan after treatment of her hypertension and cellulitis and pneumonia. Renal function creatinine 1.2 we will add low-dose ACE inhibitor, plan for limited echo in 90 days. History of second-degree heart block while on Coreg she is back to sinus rhythm today. Follow up 3 months     4/26/2021 ECHO  Interpretation Summary    · LV: Estimated LVEF is 60 - 65%. Normal cavity size, wall thickness and systolic function (ejection fraction normal). Echo Findings    Left Ventricle Normal cavity size, wall thickness and systolic function (ejection fraction normal). The estimated EF is 60 - 65%. Wall Scoring The left ventricular wall motion is normal.            Left Atrium Normal cavity size. Right Ventricle Normal cavity size and global systolic function. Right Atrium Normal cavity size.    Aortic Valve Trileaflet valve structure. Aortic valve sclerosis. Normal aortic leaflet mobility. Mitral Valve Normal valve structure. Tricuspid Valve Normal valve structure. Pulmonic Valve Normal valve structure. Aorta Normal aortic root. Pericardium No evidence of pericardial effusion       PCP Provider  Nidia Menjivar NP  Past Medical History:   Diagnosis Date    Cardiomyopathy ([facility] [address] 12/3/2020    HFrEF (heart failure with reduced ejection fraction) ([facility] [address] [facility] 12/3/2020    Hypertension, uncontrolled 12/3/2020    Mobitz type 2 second degree atrioventricular block 22/7/7917    Systolic heart failure ([facility] [address] 12/3/2020      No past surgical history on file. Allergies   Allergen Reactions    Seafood Hives    Drug Ingredient [Cetirizine] Unknown (comments)     Mistaken entry      Family History   Problem Relation Age of Onset    Diabetes Mother     Diabetes Father     Diabetes Son       Current Outpatient Medications   Medication Sig    hydrALAZINE (APRESOLINE) 25 mg tablet TAKE 1 TABLET BY MOUTH TWICE DAILY    acetaminophen (TylenoL) 325 mg tablet Take 1 Tab by mouth every four (4) hours as needed for Pain.  amLODIPine (NORVASC) 10 mg tablet Take 1 Tab by mouth daily.  cloNIDine HCL (CATAPRES) 0.2 mg tablet TAKE 1 TABLET BY MOUTH THREE TIMES DAILY    lisinopriL (PRINIVIL, ZESTRIL) 2.5 mg tablet Take 1 Tab by mouth daily.  polyethylene glycol (Miralax) 17 gram packet Take 1 Packet by mouth daily. constipation    pregabalin (Lyrica) 50 mg capsule Take 1 Cap by mouth two (2) times a day. Max Daily Amount: 100 mg.  aspirin delayed-release 81 mg tablet TAKE 1 TABLET BY MOUTH ONCE DAILY.  Wheat Dextrin (Benefiber Clear) 3 gram/3.5 gram pwpk Take 1 Package by mouth daily as needed.  ammonium lactate (LAC-HYDRIN) 12 % lotion Apply 1 Each to affected area as needed (dry skin). rub in to affected area well     No current facility-administered medications for this visit.        There were no vitals filed for this visit. Social History     Socioeconomic History    Marital status: SINGLE     Spouse name: Not on file    Number of children: 2    Years of education: Not on file    Highest education level: Not on file   Occupational History    Not on file   Social Needs    Financial resource strain: Not on file    Food insecurity     Worry: Not on file     Inability: Not on file    Transportation needs     Medical: Not on file     Non-medical: Not on file   Tobacco Use    Smoking status: Never Smoker    Smokeless tobacco: Never Used   Substance and Sexual Activity    Alcohol use: Never     Frequency: Never    Drug use: Never    Sexual activity: Not Currently     Partners: Male   Lifestyle    Physical activity     Days per week: Not on file     Minutes per session: Not on file    Stress: Not on file   Relationships    Social connections     Talks on phone: Not on file     Gets together: Not on file     Attends Orthodox service: Not on file     Active member of club or organization: Not on file     Attends meetings of clubs or organizations: Not on file     Relationship status: Not on file    Intimate partner violence     Fear of current or ex partner: Not on file     Emotionally abused: Not on file     Physically abused: Not on file     Forced sexual activity: Not on file   Other Topics Concern     Service Not Asked    Blood Transfusions Not Asked    Caffeine Concern Not Asked    Occupational Exposure Not Asked   [employer] Hazards Not Asked    Sleep Concern Not Asked    Stress Concern Not Asked    Weight Concern Not Asked    Special Diet Not Asked    Back Care Not Asked    Exercise Not Asked    Bike Helmet Not Asked   [address] Not Asked    Self-Exams Not Asked   Social History Narrative    Not on file       I have reviewed the nurses notes, vitals, problem list, allergy list, medical history, family, social history and medications.     Review of Symptoms  11 systems reviewed, negative other than as stated in the HPI      Physical Exam:      General: Well developed, in no acute distress, cooperative and alert  HEENT: No carotid bruits, no JVD, trach is midline. Neck Supple, PERRL, EOM intact. Heart:  Normal S1/S2 negative S3 or S4. Regular, no murmur, gallop or rub. Respiratory: Clear bilaterally x 4, no wheezing or rales  Abdomen:   Soft, non-tender, no masses, bowel sounds are active. Extremities:  No edema, normal cap refill, no cyanosis, atraumatic. Neuro: A&Ox3, speech clear, gait stable, using rolling walker  Skin: Skin color is normal. No rashes or lesions. Non diaphoretic  Vascular: 2+ pulses symmetric in all extremities    Cardiographics    ECG: Normal sinus rhythm        Cardiology Labs:  No results found for: CHOL, CHOLX, CHLST, CHOLV, [id number], HDL, HDLP, LDL, LDLC, DLDLP, Larry Schillings, [facility], [facility]    Lab Results   Component Value Date/Time    Sodium 140 12/10/2020 03:30 AM    Potassium 3.9 12/10/2020 03:30 AM    Chloride 108 12/10/2020 03:30 AM    CO2 24 12/10/2020 03:30 AM    Anion gap 8 12/10/2020 03:30 AM    Glucose 96 12/10/2020 03:30 AM    BUN 25 (H) 12/10/2020 03:30 AM    Creatinine 1.31 (H) 12/10/2020 03:30 AM    BUN/Creatinine ratio 19 12/10/2020 03:30 AM    GFR est AA 48 (L) 12/10/2020 03:30 AM    GFR est non-AA 39 (L) 12/10/2020 03:30 AM    Calcium 8.6 12/10/2020 03:30 AM    Bilirubin, total 0.8 12/09/2020 03:48 AM    Alk. phosphatase 96 12/09/2020 03:48 AM    Protein, total 7.1 12/09/2020 03:48 AM    Albumin 2.5 (L) 12/09/2020 03:48 AM    Globulin 4.6 (H) 12/09/2020 03:48 AM    A-G Ratio 0.5 (L) 12/09/2020 03:48 AM    ALT (SGPT) 23 12/09/2020 03:48 AM           Assessment:     Assessment:     Diagnoses and all orders for this visit:    1. HFrEF (heart failure with reduced ejection fraction) ([address]    2. Mobitz type 2 second degree atrioventricular block        ICD-10-CM ICD-9-CM    1. HFrEF (heart failure with reduced ejection fraction) ([facility])  I50.20 428.20    2.  Mobitz type 2 second degree atrioventricular block  I44.1 426.12      No orders of the defined types were placed in this encounter. Plan:     1. Nonischemic cardiomyopathy: 12/20 35-40% in setting of uncontrolled hypertension and pneumonia, has recovered 4/26/2021 EF 60-65%. Continue low-dose ACE inhibitor, no beta-blocker secondary to previous Mobitz II heart block. Recheck baseline proBNP now with a normal ejection fraction. 2.  Hypertension: Recently started on hydralazine 25 mg twice a day. First dose last night, did not take this morning. Repeat blood pressure at end of visit 132/74. Recommend obtaining home blood pressure monitor, has home health nurses coming 3 times a week till the end of the month who will also check her blood pressure. Call if systolic readings are elevated. Screening: We will check lipid panel, has mildly elevated A1c with repeat labs pending from primary care. Follow-up with cardiology in 6 months    Lee Noguera NP      Please note that this dictation was completed with [employer], the computer voice recognition software. Quite often unanticipated grammatical, syntax, homophones, and other interpretive errors are inadvertently transcribed by the computer software. Please disregard these errors. Please excuse any errors that have escaped final proofreading. Thank you.

## 2021-05-05 ENCOUNTER — OFFICE VISIT (OUTPATIENT)
Dept: CARDIOLOGY CLINIC | Age: 76
End: 2021-05-05
Payer: MEDICARE

## 2021-05-05 VITALS
RESPIRATION RATE: 20 BRPM | OXYGEN SATURATION: 98 % | WEIGHT: 134 LBS | HEART RATE: 100 BPM | HEIGHT: 64 IN | BODY MASS INDEX: 22.88 KG/M2 | SYSTOLIC BLOOD PRESSURE: 143 MMHG | DIASTOLIC BLOOD PRESSURE: 69 MMHG

## 2021-05-05 DIAGNOSIS — I44.1 MOBITZ TYPE 2 SECOND DEGREE ATRIOVENTRICULAR BLOCK: ICD-10-CM

## 2021-05-05 DIAGNOSIS — I50.20 HFREF (HEART FAILURE WITH REDUCED EJECTION FRACTION) (HCC): Primary | ICD-10-CM

## 2021-05-05 PROCEDURE — G8420 CALC BMI NORM PARAMETERS: HCPCS | Performed by: NURSE PRACTITIONER

## 2021-05-05 PROCEDURE — G8427 DOCREV CUR MEDS BY ELIG CLIN: HCPCS | Performed by: NURSE PRACTITIONER

## 2021-05-05 PROCEDURE — 99214 OFFICE O/P EST MOD 30 MIN: CPT | Performed by: NURSE PRACTITIONER

## 2021-05-05 PROCEDURE — G8432 DEP SCR NOT DOC, RNG: HCPCS | Performed by: NURSE PRACTITIONER

## 2021-05-05 PROCEDURE — G8753 SYS BP > OR = 140: HCPCS | Performed by: NURSE PRACTITIONER

## 2021-05-05 PROCEDURE — G8536 NO DOC ELDER MAL SCRN: HCPCS | Performed by: NURSE PRACTITIONER

## 2021-05-05 PROCEDURE — 1101F PT FALLS ASSESS-DOCD LE1/YR: CPT | Performed by: NURSE PRACTITIONER

## 2021-05-05 PROCEDURE — G8754 DIAS BP LESS 90: HCPCS | Performed by: NURSE PRACTITIONER

## 2021-05-05 PROCEDURE — 93000 ELECTROCARDIOGRAM COMPLETE: CPT | Performed by: NURSE PRACTITIONER

## 2021-05-05 PROCEDURE — G8400 PT W/DXA NO RESULTS DOC: HCPCS | Performed by: NURSE PRACTITIONER

## 2021-05-05 PROCEDURE — 1090F PRES/ABSN URINE INCON ASSESS: CPT | Performed by: NURSE PRACTITIONER

## 2021-05-05 RX ORDER — DEXTROMETHORPHAN HYDROBROMIDE, GUAIFENESIN 5; 100 MG/5ML; MG/5ML
650 LIQUID ORAL AS NEEDED
COMMUNITY
End: 2021-08-10 | Stop reason: SDUPTHER

## 2021-05-05 NOTE — PROGRESS NOTES
Chief Complaint   Patient presents with    Heart Problem     3m f/u, Pt c/o bilateral foot swelling. 1. Have you been to the ER, urgent care clinic since your last visit? Hospitalized since your last visit? No    2. Have you seen or consulted any other health care providers outside of the [address] [place] [address] since your last visit? Include any pap smears or colon screening. No    B/p elevated, doctor notified. Pt had both Covid vaccines. Pt does not have card.

## 2021-05-06 ENCOUNTER — HOSPITAL ENCOUNTER (OUTPATIENT)
Dept: WOUND CARE | Age: 76
Discharge: HOME | End: 2021-05-06
Admitting: SURGERY
Payer: MEDICARE

## 2021-05-06 ENCOUNTER — HOSPITAL ENCOUNTER (OUTPATIENT)
Dept: BONE DENSITY | Age: 76
Discharge: HOME | End: 2021-05-06
Attending: NURSE PRACTITIONER
Payer: MEDICARE

## 2021-05-06 VITALS
DIASTOLIC BLOOD PRESSURE: 81 MMHG | TEMPERATURE: 97.7 F | SYSTOLIC BLOOD PRESSURE: 182 MMHG | RESPIRATION RATE: 16 BRPM | HEART RATE: 81 BPM

## 2021-05-06 DIAGNOSIS — Z78.0 POST-MENOPAUSAL: ICD-10-CM

## 2021-05-06 DIAGNOSIS — L89.154 PRESSURE INJURY OF SACRAL REGION, STAGE 4 (HCC): ICD-10-CM

## 2021-05-06 DIAGNOSIS — Z13.820 OSTEOPOROSIS SCREENING: ICD-10-CM

## 2021-05-06 PROCEDURE — 99213 OFFICE O/P EST LOW 20 MIN: CPT | Performed by: SURGERY

## 2021-05-06 PROCEDURE — 77080 DXA BONE DENSITY AXIAL: CPT

## 2021-05-06 PROCEDURE — 99213 OFFICE O/P EST LOW 20 MIN: CPT

## 2021-05-06 NOTE — WOUND CARE
05/06/21 1151 Wound Sacral/coccyx #2 Date First Assessed/Time First Assessed: 02/04/21 1056   Wound Approximate Age at First Assessment (Weeks): 13 weeks  Primary Wound Type: Pressure Injury  Location: Sacral/coccyx  Wound Description: #2 Dressing Status New dressing applied Dressing/Treatment Packing;Alginate with Ag 
(border foam)  
single tubi to E

## 2021-05-06 NOTE — PROGRESS NOTES
HISTORY OF PRESENT ILLNESS:  The patient is a 57-year-old woman who is referred to the [address] [place] [address] regarding ulceration in right leg and sacral ulcer.  The patient had been hospitalized late last year at [facility] for treatment of lower extremity cellulitis.  She had been hospitalized at [facility] from 12/01/2020 to 12/10/2020 because of hypertension out of control, new onset cardiomyopathy with ejection fraction of 35%-40%, lower extremity cellulitis, acute kidney injury, and pneumonia on chest x-ray.     The patient after discharge went to a rehabilitation facility for a time.  She has had home health care with home health nurses.  She is ambulatory.  She reports good po intake.     The patient was first seen at [facility] on 2/4/2021.     The patient has history of prediabetes. Keene Hodgkin is reported to have had improvement in ejection fraction to 50% after treatment of her hypertension.  The patient can ambulate short distances with a walker inside her house. Keene Hodgkin has had falls.   Her appetite is improving.  She is not short of breath with limited activities that she carries out. Keene Hodgkin is not short of breath lying down in her bed at night.  She has no anginal chest pain.  She was felt to have nonischemic cardiomyopathy.     She has incontinence at night.     The patient has no history of smoking.     Previous dressing of packing and foam had to be changed 1-2 times per day because of incontinence.  This has not proved practical.     Prior dressing:  For the sacral wound  -  1/4-inch iodoform packing into the deep portion of the wound, then Aquacell AG covering the entire wound complex, covered by ABD and tape.  Packing and dressing to be changed by family daily and prn.     Current dressing:   Dressing for the sacral wound will consist of Aquacell AG (or Aquacell rope) packed into wound, covered by ABD.  Packing and dressing to be changed by family daily and prn.  Home Health will change 3 times per week.     The patient's last recorded weight was 134 pounds with height 5 feet 4 inches on 02/03/2021.     PHYSICAL EXAMINATION:  GENERAL:  The patient is an alert woman in no acute distress.  She appears stronger.     EXTREMITIES:  (Prior examination of the lower extremities on the left revealed 2+ dorsalis pedis pulse.  There was trace pitting edema in left lower extremity from the knee distally.  No ulcers were present in the left lower extremity).    (Prior examination of the right lower extremity revealed 2+ dorsalis pedis pulse.  There was trace pitting edema in the right lower extremity. No ulcer).    Examination of the sacral region revealed an ulcer with the deepest portion being in the midline presacral region, dimensions 0.6 x 0.4 x 0.6 with tunneling 1.1 cm  with clean granulation.  No palpable or visible bone.        Leg wound healed.  Sacral wound persisting.   Concerns about impact of nocturnal incontinence.         I have discussed with the patient and family the need for her to at this time never lie down directly supine.  She should lie on right side, left side, or prone.  She can sit straight upright.  She is reported to tend to slouch when sitting.  The family has a U-shaped sacral cushion to try to minimize pressure directly on the sacral site when she is attempting to sit.  Also, using pillows to tilt her substantially off midline was discussed.     She sits on a sofa with U shaped cushion.     The patient presently has only Medicare and is applying for Medicaid.   Her good improvement in the wounds and better mobility suggests she does not need alternating pressure mattress.     Dressing ordered:  For the sacral wound will consist of Aquacell AG (or Aquacell rope) packed into wound, covered by bordered super absorbant pad.  Packing and dressing to be changed by family daily and prn.  Home Health will change 3 times per week.     Strong palpable DP pulses in feet suggests ischemia is not source of distal foot pain. Can see podiatrist as needed.   She is already on pregabalin.     The patient will follow up in [facility]:  Stage IV sacral pressure ulcer, ulcer in right lower leg with fat layer exposed (healed), urinary incontinence, congestive heart failure.      Q63.053     Elvis Marquez MD

## 2021-05-06 NOTE — DISCHARGE INSTRUCTIONS
Discharge Instructions/Wound [address] [facility]  [address] [facility]  [address], [address]  Telephone: [phone]     FAX [phone]  WOUND CARE ORDERS:    Right lower leg - Cleanse with saline or mild soap and water. Pat dry w/gauze. Leave the area Open to Air, Single layer tubigrip size E (from base of toes to just under knee). Apply in the [address] and may take off at night, every day. Sacral wound - cleanse with saline, pack with Aquacel AG rope, cover with  Silicone bordered super absorbent pad ( bordered foam used in clinic today. ). PCG/HHC to daily and as needed,  prn soiling. RTC for MD follow-up in 3 weeks  TREATMENT ORDERS:  Turn/reposition every 2 hours when in bed, avoid direct pressure on wound site. When sitting, shift position or do seat lifts every 15 minutes. Limit side lying to 30 degree tilt. Limit HOB elevation to 30 degrees. Use speciality pressure relief cushion, mattress as appropriate. Follow diet as prescribed:  [] Diet as tolerated: [x] Calorie Diabetic Diet:Low carb and no Sugar [] No Added Salt:[] Increase Protein: [] Other:Limit the amount of liquid you are drinking and avoid drinking in between meals              Return Appointment:  [x] Return Appointment: With DR Jl Pham  in  [facility])  [] Ordered tests:    Electronically signed July Millard on 5/6/2021 at 11:48 AM     Lida [address]: Should you experience any significant changes in your wound(s) or have questions about your wound care, please contact the [address] [place] [address] at [address] [place] [address] 8:00 am - 4:30. If you need help with your wound outside these hours and cannot wait until we are again available, contact your PCP or go to the hospital emergency room. PLEASE NOTE: IF YOU ARE UNABLE TO OBTAIN WOUND SUPPLIES, CONTINUE TO USE THE SUPPLIES YOU HAVE AVAILABLE UNTIL YOU ARE ABLE TO REACH US.  IT IS MOST IMPORTANT TO KEEP THE WOUND COVERED AT ALL TIMES.      Physician Signature:_______________________    Date: ___________ Time:  ____________

## 2021-05-06 NOTE — WOUND CARE
05/06/21 1120 Wound Sacral/coccyx #2 Date First Assessed/Time First Assessed: 02/04/21 1056   Wound Approximate Age at First Assessment (Weeks): 13 weeks  Primary Wound Type: Pressure Injury  Location: Sacral/coccyx  Wound Description: #2 Wound Image Wound Etiology Pressure Stage 4 Dressing Status Old drainage noted Cleansed Cleansed with saline Dressing/Treatment (Silver alginate, foam border) Wound Length (cm) 0.6 cm Wound Width (cm) 0.4 cm Wound Depth (cm) 0.6 cm Wound Surface Area (cm^2) 0.24 cm^2 Change in Wound Size % 99.11 Wound Volume (cm^3) 0.14 cm^3 Wound Healing % 100 Wound Assessment Pink/red Drainage Amount Moderate Drainage Description Serosanguinous Wound Odor None [place]-Wound/Incision Assessment Maceration Edges Flat/open edges Wound Thickness Description Full thickness Visit Vitals BP (!) 182/81 (BP 1 Location: Right upper arm, BP Patient Position: At rest;Supine) Pulse 81 Temp 97.7 °F (36.5 °C) Resp 16

## 2021-05-07 ENCOUNTER — TELEPHONE (OUTPATIENT)
Dept: INTERNAL MEDICINE CLINIC | Age: 76
End: 2021-05-07

## 2021-05-07 DIAGNOSIS — E11.9 CONTROLLED TYPE 2 DIABETES MELLITUS WITHOUT COMPLICATION, WITHOUT LONG-TERM CURRENT USE OF INSULIN (HCC): Primary | ICD-10-CM

## 2021-05-07 RX ORDER — LANCETS
EACH MISCELLANEOUS
Qty: 1 EACH | Refills: 11 | Status: SHIPPED | OUTPATIENT
Start: 2021-05-07

## 2021-05-07 RX ORDER — ISOPROPYL ALCOHOL 70 ML/100ML
1 SWAB TOPICAL DAILY
Qty: 100 PAD | Refills: 11 | Status: SHIPPED | OUTPATIENT
Start: 2021-05-07

## 2021-05-07 RX ORDER — INSULIN PUMP SYRINGE, 3 ML
EACH MISCELLANEOUS
Qty: 1 KIT | Refills: 0 | Status: SHIPPED | OUTPATIENT
Start: 2021-05-07

## 2021-05-07 NOTE — TELEPHONE ENCOUNTER
attempting to call pt from personal cell since working from home today, phone does not accept # from blocked cell  Will ask nursing to call pt  In regards to labs  - diabetes

## 2021-05-10 ENCOUNTER — TELEPHONE (OUTPATIENT)
Dept: INTERNAL MEDICINE CLINIC | Age: 76
End: 2021-05-10

## 2021-05-10 RX ORDER — ASPIRIN 81 MG/1
81 TABLET ORAL DAILY
Qty: 90 TAB | Refills: 0 | Status: SHIPPED | OUTPATIENT
Start: 2021-05-10 | End: 2021-05-10 | Stop reason: SDUPTHER

## 2021-05-10 RX ORDER — ASPIRIN 81 MG/1
81 TABLET ORAL DAILY
Qty: 90 TAB | Refills: 0 | Status: SHIPPED | OUTPATIENT
Start: 2021-05-10 | End: 2021-07-14

## 2021-05-10 RX ORDER — ASPIRIN 81 MG/1
81 TABLET ORAL DAILY
Qty: 90 TAB | Refills: 0 | Status: CANCELLED | OUTPATIENT
Start: 2021-05-10

## 2021-05-10 NOTE — TELEPHONE ENCOUNTER
Jessee Hollingsworth left an updated voice message regarding refill of Aspirin 81 mg. The refill is to be sent to the [address] [place] [address] on file and not the [facility] as requested in previous voice message. Please resend the prescription for the Aspirin 81 mg to the [address] [place] [address] on file. Thank you. Requested Prescriptions     Pending Prescriptions Disp Refills    aspirin delayed-release 81 mg tablet 90 Tab 0     Sig: Take 1 Tab by mouth daily.

## 2021-05-10 NOTE — TELEPHONE ENCOUNTER
Mary Martinez left a voice message requesting a 90 day supply on behalf of the pt. Medication was refused by Dr. Carlos Alberto Landers stating pt should not be taking any NSAIDS at this time. Please contact Mary Martinez at   [phone]. Last visit 04/29/2021 BONG Alvarado   Next appointment 05/18/2021 BONG Alvarado   Previous refill encounter(s)   04/09/2021 Aspirin 81 mg #30     Requested Prescriptions     Pending Prescriptions Disp Refills    aspirin delayed-release 81 mg tablet 90 Tab 0     Sig: Take 1 Tab by mouth daily.

## 2021-05-11 PROBLEM — M85.89 OSTEOPENIA OF MULTIPLE SITES: Status: ACTIVE | Noted: 2021-05-11

## 2021-05-18 ENCOUNTER — OFFICE VISIT (OUTPATIENT)
Dept: INTERNAL MEDICINE CLINIC | Age: 76
End: 2021-05-18
Payer: MEDICARE

## 2021-05-18 VITALS
OXYGEN SATURATION: 100 % | DIASTOLIC BLOOD PRESSURE: 75 MMHG | RESPIRATION RATE: 18 BRPM | BODY MASS INDEX: 24.07 KG/M2 | TEMPERATURE: 98.1 F | HEIGHT: 64 IN | HEART RATE: 74 BPM | SYSTOLIC BLOOD PRESSURE: 145 MMHG | WEIGHT: 141 LBS

## 2021-05-18 DIAGNOSIS — I10 ESSENTIAL HYPERTENSION: ICD-10-CM

## 2021-05-18 DIAGNOSIS — E11.9 CONTROLLED TYPE 2 DIABETES MELLITUS WITHOUT COMPLICATION, WITHOUT LONG-TERM CURRENT USE OF INSULIN (HCC): Primary | ICD-10-CM

## 2021-05-18 DIAGNOSIS — I42.0 DILATED CARDIOMYOPATHY (HCC): ICD-10-CM

## 2021-05-18 LAB — GLUCOSE POC: 128 MG/DL

## 2021-05-18 PROCEDURE — G8420 CALC BMI NORM PARAMETERS: HCPCS | Performed by: NURSE PRACTITIONER

## 2021-05-18 PROCEDURE — G8432 DEP SCR NOT DOC, RNG: HCPCS | Performed by: NURSE PRACTITIONER

## 2021-05-18 PROCEDURE — G8399 PT W/DXA RESULTS DOCUMENT: HCPCS | Performed by: NURSE PRACTITIONER

## 2021-05-18 PROCEDURE — G8427 DOCREV CUR MEDS BY ELIG CLIN: HCPCS | Performed by: NURSE PRACTITIONER

## 2021-05-18 PROCEDURE — 1090F PRES/ABSN URINE INCON ASSESS: CPT | Performed by: NURSE PRACTITIONER

## 2021-05-18 PROCEDURE — G8753 SYS BP > OR = 140: HCPCS | Performed by: NURSE PRACTITIONER

## 2021-05-18 PROCEDURE — G8536 NO DOC ELDER MAL SCRN: HCPCS | Performed by: NURSE PRACTITIONER

## 2021-05-18 PROCEDURE — 1101F PT FALLS ASSESS-DOCD LE1/YR: CPT | Performed by: NURSE PRACTITIONER

## 2021-05-18 PROCEDURE — G8754 DIAS BP LESS 90: HCPCS | Performed by: NURSE PRACTITIONER

## 2021-05-18 PROCEDURE — 82962 GLUCOSE BLOOD TEST: CPT | Performed by: NURSE PRACTITIONER

## 2021-05-18 PROCEDURE — 99213 OFFICE O/P EST LOW 20 MIN: CPT | Performed by: NURSE PRACTITIONER

## 2021-05-18 RX ORDER — BIOTIN 5 MG
1 CAPSULE ORAL DAILY
Qty: 90 CAP | Refills: 0 | Status: SHIPPED | OUTPATIENT
Start: 2021-05-18 | End: 2022-10-10

## 2021-05-18 NOTE — PROGRESS NOTES
Subjective: (As above and below)     Chief Complaint   Patient presents with   Pieter Lindo is a 66y.o. year old female who presents for     Diabetic Review of Systems - newly diagnosed, for now recommendations for diet improvements, she   Eye exam    Hypertension ROS:  taking medications as instructed, no medication side effects noted, no TIAs, no chest pain on exertion, no dyspnea on exertion, no swelling of ankles    BP Readings from Last 3 Encounters:   05/18/21 (!) 145/75   05/06/21 (!) 182/81   05/05/21 (!) 143/69       CHF: followed by cardiology, last EF improved to 60-65%  Denies LACY chest pain        Reviewed PmHx, RxHx, FmHx, SocHx, AllgHx and updated in chart. Family History   Problem Relation Age of Onset    Diabetes Mother     Diabetes Father     Diabetes Son        Past Medical History:   Diagnosis Date    Cardiomyopathy ([facility] 12/3/2020    HFrEF (heart failure with reduced ejection fraction) ([facility] 12/3/2020    Hypertension, uncontrolled 12/3/2020    Mobitz type 2 second degree atrioventricular block 11/6/1198    Systolic heart failure ([facility] 12/3/2020      Social History     Socioeconomic History    Marital status: SINGLE     Spouse name: Not on file    Number of children: 2    Years of education: Not on file    Highest education level: Not on file   Tobacco Use    Smoking status: Never Smoker    Smokeless tobacco: Never Used   Vaping Use    Vaping Use: Never used   Substance and Sexual Activity    Alcohol use: Never    Drug use: Never    Sexual activity: Not Currently     Partners: Male   Other Topics Concern     Social Determinants of Health     Financial Resource Strain:     Difficulty of Paying Living Expenses:    Food Insecurity:     Worried About Running Out of Food in the Last Year:     [address] in the Last Year:    Transportation Needs:     Lack of Transportation (Medical):      Lack of Transportation (Non-Medical):    Physical Activity:     Days of Exercise per Week:     Minutes of Exercise per Session:    Stress:     Feeling of Stress :    Social Connections:     Frequency of Communication with Friends and Family:     Frequency of Social Gatherings with Friends and Family:     Attends Taoism Services:     Active Member of Clubs or Organizations:     Attends Club or Organization Meetings:     Marital Status:           Current Outpatient Medications   Medication Sig    glucose blood VI test strips (ASCENSIA AUTODISC VI, ONE TOUCH ULTRA TEST VI) strip Check sugar once daily fasting.  calcium carbonate-vitamin D3 (Calcium 600 with Vitamin D3) 600 mg(1,500mg) -500 unit cap Take 1 Each by mouth daily.  aspirin delayed-release 81 mg tablet Take 1 Tab by mouth daily.  hydrALAZINE (APRESOLINE) 25 mg tablet TAKE 1 TABLET BY MOUTH TWICE DAILY    amLODIPine (NORVASC) 10 mg tablet Take 1 Tab by mouth daily.  cloNIDine HCL (CATAPRES) 0.2 mg tablet TAKE 1 TABLET BY MOUTH THREE TIMES DAILY    lisinopriL (PRINIVIL, ZESTRIL) 2.5 mg tablet Take 1 Tab by mouth daily.  pregabalin (Lyrica) 50 mg capsule Take 1 Cap by mouth two (2) times a day. Max Daily Amount: 100 mg.  Blood-Glucose Meter monitoring kit Check sugar once daily fasting. E11.9    lancets misc Use as directed. Dx: E11.9 Check sugar once daily fasting    alcohol swabs padm 1 Each by Apply Externally route daily. For checking sugar    acetaminophen (TYLENOL) 650 mg TbER Take 650 mg by mouth as needed for Pain.  acetaminophen (TylenoL) 325 mg tablet Take 1 Tab by mouth every four (4) hours as needed for Pain. (Patient not taking: Reported on 5/5/2021)    polyethylene glycol (Miralax) 17 gram packet Take 1 Packet by mouth daily. constipation    Wheat Dextrin (Benefiber Clear) 3 gram/3.5 gram pwpk Take 1 Package by mouth daily as needed.  ammonium lactate (LAC-HYDRIN) 12 % lotion Apply 1 Each to affected area as needed (dry skin).  rub in to affected area well     No current facility-administered medications for this visit. Review of Systems:   Constitutional:    Negative for fever and chills, negative diaphoresis. HEENT:              Negative for neck pain and stiffness. Eyes:                  Negative for visual disturbance, itching, redness or discharge. Respiratory:        Negative for cough and shortness of breath. Cardiovascular:  Negative for chest pain and palpitations. Gastrointestinal: Negative for nausea, vomiting, abdominal pain, diarrhea or constipation. Genitourinary:     Negative for dysuria and frequency. Musculoskeletal: Negative for falls, tenderness and swelling. Skin:                    Negative for rash, masses or lesions. Neurological:       Negative for dizzyness, seizure, loss of consciousness, weakness and numbness. Objective:     Vitals:    05/18/21 1059   BP: (!) 145/75   Pulse: 74   Resp: 18   Temp: 98.1 °F (36.7 °C)   TempSrc: Temporal   SpO2: 100%   Weight: 141 lb (64 kg)   Height: 5' 4\" (1.626 m)     Results for orders placed or performed in visit on 05/18/21   AMB POC GLUCOSE BLOOD, BY GLUCOSE MONITORING DEVICE   Result Value Ref Range    Glucose  MG/DL       Gen: Oriented to person, place and time and well-developed, well-nourished and in no distress. HEENT:    Head: normocephalic and atraumatic. Eyes:  EOM are normal. Pupils equal and round. Neck:  Normal range of motion. Neck supple. Cardiovascular: normal rate, regular rhythm and normal heart sounds. Pulmonary/Chest:  Effort normal and breath sounds normal.  No respiratory distress. No wheezes, no rales. Abdominal: soft, normal  bowel sounds. Musculoskeletal:  No edema, no tenderness. No calf tenderness or edema. Neurological:  Alert, oriented to person, place and time. Skin: skin is warm and dry. Assessment/ Plan:     Follow-up and Dispositions    · Return in about 12 weeks (around 8/10/2021) for dm.          1. Controlled type 2 diabetes mellitus without complication, without long-term current use of insulin (HCC)    - AMB POC GLUCOSE BLOOD, BY GLUCOSE MONITORING DEVICE    2. Essential hypertension  improving    3. Dilated cardiomyopathy ([facility]  Cont care w/ cardiology        I have discussed the diagnosis with the patient and the intended plan as seen in the above orders. The patient has received an after-visit summary and questions were answered concerning future plans. Pt conveyed understanding of plan. Medication Side Effects and Warnings were discussed with patient: yes  Patient Labs were reviewed: yes  Patient Past Records were reviewed:  yes    Maylon Dancer.  Amado Hillman NP

## 2021-05-18 NOTE — PROGRESS NOTES
Chief Complaint   Patient presents with    Follow-up       1. Have you been to the ER, urgent care clinic since your last visit? Hospitalized since your last visit? No    2. Have you seen or consulted any other health care providers outside of the [address] [place] [address] since your last visit? Include any pap smears or colon screening.  No

## 2021-05-18 NOTE — PATIENT INSTRUCTIONS
Goal fasting blood sugar is between     Please call me in 2 weeks with your numbers  Or sign up for [employer] and email me

## 2021-05-27 ENCOUNTER — HOSPITAL ENCOUNTER (OUTPATIENT)
Dept: WOUND CARE | Age: 76
Discharge: HOME | End: 2021-05-27
Admitting: SURGERY
Payer: MEDICARE

## 2021-05-27 VITALS
SYSTOLIC BLOOD PRESSURE: 146 MMHG | DIASTOLIC BLOOD PRESSURE: 68 MMHG | HEART RATE: 75 BPM | RESPIRATION RATE: 16 BRPM | TEMPERATURE: 98.7 F

## 2021-05-27 DIAGNOSIS — L89.154 PRESSURE INJURY OF SACRAL REGION, STAGE 4 (HCC): ICD-10-CM

## 2021-05-27 PROCEDURE — 99213 OFFICE O/P EST LOW 20 MIN: CPT

## 2021-05-27 PROCEDURE — 99213 OFFICE O/P EST LOW 20 MIN: CPT | Performed by: SURGERY

## 2021-05-27 NOTE — PROGRESS NOTES
Pressure Injury Interventions: Continue with all pressure relief measures, including Q2hr turning/repositioning, and pressure relief cushion.

## 2021-05-27 NOTE — PROGRESS NOTES
HISTORY OF PRESENT ILLNESS:  The patient is a 70-year-old woman who is referred to the [address] [place] [address] Road regarding ulceration in right leg and sacral ulcer.  The patient had been hospitalized late last year at [facility] for treatment of lower extremity cellulitis.  She had been hospitalized at [facility] from 12/01/2020 to 12/10/2020 because of hypertension out of control, new onset cardiomyopathy with ejection fraction of 35%-40%, lower extremity cellulitis, acute kidney injury, and pneumonia on chest x-ray.     The patient after discharge went to a rehabilitation facility for a time.  She has had home health care with home health nurses.  She is ambulatory.  She reports good po intake.     The patient was first seen at [facility] on 2/4/2021.     The patient has history of prediabetes. Loraine Tong is reported to have had improvement in ejection fraction to 50% after treatment of her hypertension.  The patient can ambulate short distances with a walker inside her house. Loraine Tong has had falls.  Her appetite is improving.  She is not short of breath with limited activities that she carries out. Loraine Tong is not short of breath lying down in her bed at night.  She has no anginal chest pain.  She was felt to have nonischemic cardiomyopathy.     She has incontinence at night.     The patient has no history of smoking.     Previous dressing of packing and foam had to be changed 1-2 times per day because of incontinence.  This has not proved practical.     Prior dressing:  For the sacral wound  -  1/4-inch iodoform packing into the deep portion of the wound, then Aquacell AG covering the entire wound complex, covered by ABD and tape.  Packing and dressing to be changed by family daily and prn.     Current dressing:   Dressing for the sacral wound will consist of Aquacell AG (or Aquacell rope) packed into wound, covered by ABD.  Packing and dressing to be changed by family daily and prn.  Home Health will change 3 times per week.     The patient's reported weight  141 pounds with height 5 feet 4 inches.     PHYSICAL EXAMINATION:  GENERAL:  The patient is an alert woman in no acute distress.  She appears stronger.     EXTREMITIES:  (Prior examination of the lower extremities on the left revealed 2+ dorsalis pedis pulse.  There was trace pitting edema in left lower extremity from the knee distally.  No ulcers were present in the left lower extremity).    (Prior examination of the right lower extremity revealed 2+ dorsalis pedis pulse.  There was trace pitting edema in the right lower extremity. No ulcer).      Examination of the sacral region revealed an ulcer with the deepest portion being in the midline presacral region, dimensions 0.3 x 0.1 x 0.4 cm  with clean granulation.  No palpable or visible bone.        Wound improved.         I have discussed with the patient and family the need for her to at this time never lie down directly supine.  She should lie on right side, left side, or prone.  She can sit straight upright.  She is reported to tend to slouch when sitting.  The family has a U-shaped sacral cushion to try to minimize pressure directly on the sacral site when she is attempting to sit.  Also, using pillows to tilt her substantially off midline was discussed.     She sits on a sofa with U shaped cushion.     The patient presently has only Medicare and is applying for Medicaid.   Her good improvement in the wounds and better mobility suggests she does not need alternating pressure mattress.     Dressing ordered:  For the sacral wound will consist of Aquacell AG (or Aquacell rope) packed into wound, covered by bordered super absorbant pad.  Packing and dressing to be changed by family daily and prn.  Home Health will change 3 times per week.          The patient will follow up in [address] [place] [address] in 3 weeks.     FINAL DIAGNOSES:  Stage IV sacral pressure ulcer, urinary incontinence, congestive heart failure.      Z90.600     German Tolliver MD

## 2021-05-27 NOTE — DISCHARGE INSTRUCTIONS
Discharge Instructions/Wound [address] [facility]  [address] [facility] [address] [facility]  Telephone: [phone]  WOUND CARE ORDERS:  Sacral wound - cleanse with saline, pack with Aquacel AG rope, cover with  Silicone bordered super absorbent pad ( bordered foam used in clinic today. ). PCG/HHC to daily and as needed,  prn soiling. RTC for MD follow-up in 3 weeks    TREATMENT ORDERS:  Turn/reposition every 2 hours when in bed, avoid direct pressure on wound site. When sitting, shift position or do seat lifts every 15 minutes. Limit side lying to 30 degree tilt. Limit HOB elevation to 30 degrees. Use speciality pressure relief cushion, mattress as appropriate. Follow diet as prescribed:  [x] Diet as tolerated: [] Calorie Diabetic Diet:Low carb and no Sugar [] No Added Salt:[x] Increase Protein: [] Other:Limit the amount of liquid you are drinking and avoid drinking in between meals              Return Appointment:  [x] Return Appointment: With DR Jl Pham  in  3 [facility])  [] Ordered tests:    Electronically signed Mala Wiley RN on 5/27/2021 at 12:05 PM   We're moving, Effective July 6, 2021  New Address is  [address] [facility] 1, [address] [facility], [address] [place] [address] Information: Should you experience any significant changes in your wound(s) or have questions about your wound care, please contact the [address] [place] [address] at [address] [place] [address] 8:00 am - 4:30. If you need help with your wound outside these hours and cannot wait until we are again available, contact your PCP or go to the hospital emergency room. PLEASE NOTE: IF YOU ARE UNABLE TO OBTAIN WOUND SUPPLIES, CONTINUE TO USE THE SUPPLIES YOU HAVE AVAILABLE UNTIL YOU ARE ABLE TO REACH US. IT IS MOST IMPORTANT TO KEEP THE WOUND COVERED AT ALL TIMES.      Physician Signature:_______________________    Date: ___________ Time:  ____________

## 2021-05-27 NOTE — WOUND CARE
05/27/21 1127 Wound Sacral/coccyx #2 Date First Assessed/Time First Assessed: 02/04/21 1056   Wound Approximate Age at First Assessment (Weeks): 13 weeks  Primary Wound Type: Pressure Injury  Location: Sacral/coccyx  Wound Description: #2 Wound Image Wound Etiology Pressure Stage 4 Dressing Status Old drainage noted Cleansed Cleansed with saline Wound Length (cm) 0.3 cm Wound Width (cm) 0.1 cm Wound Depth (cm) 0.4 cm Wound Surface Area (cm^2) 0.03 cm^2 Change in Wound Size % 99.89 Wound Volume (cm^3) 0.01 cm^3 Wound Healing % 100 Wound Assessment Pink/red Drainage Amount Small Drainage Description Serosanguinous Wound Odor None Juana-Wound/Incision Assessment Intact Edges Flat/open edges Wound Thickness Description Full thickness

## 2021-05-27 NOTE — WOUND CARE
05/27/21 1214 Wound Sacral/coccyx #2 Date First Assessed/Time First Assessed: 02/04/21 1056   Wound Approximate Age at First Assessment (Weeks): 13 weeks  Primary Wound Type: Pressure Injury  Location: Sacral/coccyx  Wound Description: #2 Dressing Status New dressing applied Cleansed Cleansed with saline Dressing/Treatment Packing;Alginate with Ag (Border foam)

## 2021-06-03 RX ORDER — FLASH GLUCOSE SCANNING READER
1 EACH MISCELLANEOUS EVERY 2 WEEKS
Qty: 1 EACH | Refills: 0 | Status: SHIPPED | OUTPATIENT
Start: 2021-06-03

## 2021-06-03 RX ORDER — FLASH GLUCOSE SENSOR
1 KIT MISCELLANEOUS EVERY 2 WEEKS
Qty: 6 KIT | Refills: 11 | Status: SHIPPED | OUTPATIENT
Start: 2021-06-03

## 2021-06-11 RX ORDER — CLONIDINE HYDROCHLORIDE 0.2 MG/1
0.2 TABLET ORAL 3 TIMES DAILY
Qty: 90 TABLET | Refills: 0 | Status: SHIPPED | OUTPATIENT
Start: 2021-06-11 | End: 2021-08-10

## 2021-06-17 ENCOUNTER — HOSPITAL ENCOUNTER (OUTPATIENT)
Dept: WOUND CARE | Age: 76
Discharge: HOME | End: 2021-06-17
Payer: MEDICARE

## 2021-06-17 VITALS
HEART RATE: 79 BPM | DIASTOLIC BLOOD PRESSURE: 77 MMHG | RESPIRATION RATE: 16 BRPM | TEMPERATURE: 97.3 F | SYSTOLIC BLOOD PRESSURE: 196 MMHG

## 2021-06-17 DIAGNOSIS — L89.154 PRESSURE INJURY OF SACRAL REGION, STAGE 4 (HCC): ICD-10-CM

## 2021-06-17 PROCEDURE — 99213 OFFICE O/P EST LOW 20 MIN: CPT

## 2021-06-17 PROCEDURE — 99213 OFFICE O/P EST LOW 20 MIN: CPT | Performed by: SURGERY

## 2021-06-17 RX ORDER — METHYLPREDNISOLONE 4 MG/1
4 TABLET ORAL
COMMUNITY
End: 2021-07-12

## 2021-06-17 NOTE — PROGRESS NOTES
Continue with all pressure relief measures including Q2hr turning/repositioning and use of pressure relief cushion when sitting.

## 2021-06-17 NOTE — WOUND CARE
06/17/21 1126 Wound Sacral/coccyx #2 Date First Assessed/Time First Assessed: 02/04/21 1056   Wound Approximate Age at First Assessment (Weeks): 13 weeks  Primary Wound Type: Pressure Injury  Location: Sacral/coccyx  Wound Description: #2 Wound Image Wound Etiology Pressure Stage 4 Dressing Status Old drainage noted Cleansed Cleansed with saline Dressing/Treatment  
(Alginate, foam border) Wound Length (cm) 0.2 cm Wound Width (cm) 0.1 cm Wound Depth (cm) 0.2 cm Wound Surface Area (cm^2) 0.02 cm^2 Change in Wound Size % 99.93 Wound Volume (cm^3) 0 cm^3 Wound Healing % 100 Wound Assessment Pink/red Drainage Amount Small Drainage Description Serosanguinous Wound Odor None Juana-Wound/Incision Assessment Intact Edges Flat/open edges Wound Thickness Description Full thickness Visit Vitals BP (!) 196/77 (BP 1 Location: Left upper arm, BP Patient Position: At rest;Sitting) Pulse 79 Temp 97.3 °F (36.3 °C) Resp 16

## 2021-06-17 NOTE — DISCHARGE INSTRUCTIONS
Discharge Instructions/Wound 600 [facility]  [address] [facility], [address] [facility]  Telephone: [phone]  WOUND CARE ORDERS:  Sacral wound - cleanse with saline, pack with Aquacel AG rope, cover with  Silicone bordered super absorbent pad ( bordered foam used in clinic today. ). PCG/HHC to daily and as needed,  prn soiling. RTC for MD follow-up in 3 weeks    TREATMENT ORDERS:  Turn/reposition every 2 hours when in bed, avoid direct pressure on wound site. When sitting, shift position or do seat lifts every 15 minutes. Limit side lying to 30 degree tilt. Limit HOB elevation to 30 degrees. Use speciality pressure relief cushion, mattress as appropriate. Follow diet as prescribed:  [x] Diet as tolerated: [] Calorie Diabetic Diet:Low carb and no Sugar [] No Added Salt:[x] Increase Protein: [] Other:Limit the amount of liquid you are drinking and avoid drinking in between meals              Return Appointment:  [x] Return Appointment: With DR Svetlana Day  in  3 [facility])  [] Ordered tests:    Electronically signed Fabio Yañez RN on 6/17/2021 at 11:39 AM   We're moving, Effective July 6, 2021  New Address is  [address] [place] [address] 1, [address] [facility], [facility] Information: Should you experience any significant changes in your wound(s) or have questions about your wound care, please contact the [address] [place] [address] at [address] [place] [address] 8:00 am - 4:30. If you need help with your wound outside these hours and cannot wait until we are again available, contact your PCP or go to the hospital emergency room. PLEASE NOTE: IF YOU ARE UNABLE TO OBTAIN WOUND SUPPLIES, CONTINUE TO USE THE SUPPLIES YOU HAVE AVAILABLE UNTIL YOU ARE ABLE TO REACH US. IT IS MOST IMPORTANT TO KEEP THE WOUND COVERED AT ALL TIMES.      Physician Signature:_______________________    Date: ___________ Time:  ____________

## 2021-06-17 NOTE — PROGRESS NOTES
HISTORY OF PRESENT ILLNESS:  The patient is a 75-year-old woman who is referred to the [address] [place] [address] regarding ulceration in right leg and sacral ulcer.  The patient had been hospitalized late last year at [facility] for treatment of lower extremity cellulitis.  She had been hospitalized at [address] [place] [address] from 12/01/2020 to 12/10/2020 because of hypertension out of control, new onset cardiomyopathy with ejection fraction of 35%-40%, lower extremity cellulitis, acute kidney injury, and pneumonia on chest x-ray.     Leg ulcer has healed.     The patient after discharge went to a rehabilitation facility for a time.  She is now at home and has  home health care with home health nurses.  She is ambulatory.  She reports good po intake.     [facility].     The patient has history of prediabetes. Iliana Simmons is reported to have had improvement in ejection fraction to 50% after treatment of her hypertension.  The patient can ambulate short distances with a walker inside her house. Iliana Simmons has had falls.  Her appetite is improving.  She is not short of breath with limited activities that she carries out. Iliana Simmons is not short of breath lying down in her bed at night.  She has no anginal chest pain.  She was felt to have nonischemic cardiomyopathy.     She has incontinence at night.     The patient has no history of smoking.     Previous dressing of packing and foam had to be changed 1-2 times per day because of incontinence.  This has not proved practical.     Prior dressing:  For the sacral wound  -  1/4-inch iodoform packing into the deep portion of the wound, then Aquacell AG covering the entire wound complex, covered by ABD and tape.  Packing and dressing to be changed by family daily and prn.     Prior dressing:   Dressing for the sacral wound will consist of Aquacell AG (or Aquacell rope) packed into wound, covered by ABD.  Packing and dressing to be changed by family daily and prn.  Home Health will change 3 times per week. Current dressing:   Dressing for the sacral wound will consist of Aquacell AG sheet,  covered by bordered super absorbant pad.  Dressing to be changed by family daily and prn.  Home Health will change 3 times per week.       The patient's reported weight  141 pounds with height 5 feet 4 inches.     PHYSICAL EXAMINATION:    GENERAL:  The patient is an alert woman in no acute distress.  She appears stronger.     EXTREMITIES:  (Prior examination of the lower extremities on the left revealed 2+ dorsalis pedis pulse.  There was trace pitting edema in left lower extremity from the knee distally.  No ulcers were present in the left lower extremity).    (Prior examination of the right lower extremity revealed 2+ dorsalis pedis pulse.  There was trace pitting edema in the right lower extremity. No ulcer).      Examination of the sacral region revealed an ulcer with the deepest portion being in the midline presacral region, dimensions 0.2 x 0.1 x 0.1 cm  with clean granulation.  No palpable or visible bone.        Wound again improved.         I have discussed with the patient and family the need for her to at this time never lie down directly supine.  She should lie on right side, left side, or prone.  She can sit straight upright.  She is reported to tend to slouch when sitting.  The family has a U-shaped sacral cushion to try to minimize pressure directly on the sacral site when she is attempting to sit.  Also, using pillows to tilt her substantially off midline was discussed.     She sits on a sofa with U shaped cushion.     The patient presently has only Medicare and is applying for Medicaid.   Her good improvement in the wounds and better mobility suggests she does not need alternating pressure mattress.     Dressing ordered:  For the sacral wound will consist of Aquacell AG sheet on the wound, covered by bordered super absorbant pad.  Dressing to be changed by family daily and prn.  Home Health will change 3 times per week.           The patient will follow up in [address] [place] [address] in 3 weeks.     FINAL DIAGNOSES:  Stage IV sacral pressure ulcer, urinary incontinence, congestive heart failure.      H49.515     Chantel Bloom MD

## 2021-06-19 DIAGNOSIS — G62.9 NEUROPATHY: ICD-10-CM

## 2021-06-21 ENCOUNTER — TRANSCRIBE ORDER (OUTPATIENT)
Dept: SCHEDULING | Age: 76
End: 2021-06-21

## 2021-06-21 DIAGNOSIS — M79.673 FOOT PAIN: Primary | ICD-10-CM

## 2021-06-21 RX ORDER — PREGABALIN 50 MG/1
CAPSULE ORAL
Qty: 60 CAPSULE | Refills: 1 | Status: SHIPPED | OUTPATIENT
Start: 2021-06-21 | End: 2021-11-17

## 2021-06-25 ENCOUNTER — HOSPITAL ENCOUNTER (OUTPATIENT)
Dept: VASCULAR SURGERY | Age: 76
Discharge: HOME | End: 2021-06-25
Attending: PODIATRIST
Payer: MEDICARE

## 2021-06-25 DIAGNOSIS — M79.673 FOOT PAIN: ICD-10-CM

## 2021-06-25 PROCEDURE — 93922 UPR/L XTREMITY ART 2 LEVELS: CPT

## 2021-06-27 LAB
LEFT ABI: 1.14
LEFT ANTERIOR TIBIAL: 196 MMHG
LEFT ARM BP: 172 MMHG
LEFT POSTERIOR TIBIAL: 185 MMHG
LEFT TBI: 0.92
LEFT TOE PRESSURE: 158 MMHG
RIGHT ABI: 1.09
RIGHT ANTERIOR TIBIAL: 183 MMHG
RIGHT ARM BP: 171 MMHG
RIGHT POSTERIOR TIBIAL: 188 MMHG
RIGHT TBI: 0.74
RIGHT TOE PRESSURE: 128 MMHG

## 2021-06-27 PROCEDURE — 93922 UPR/L XTREMITY ART 2 LEVELS: CPT | Performed by: INTERNAL MEDICINE

## 2021-07-12 ENCOUNTER — HOSPITAL ENCOUNTER (OUTPATIENT)
Dept: WOUND CARE | Age: 76
Discharge: HOME | End: 2021-07-12
Admitting: SURGERY
Payer: MEDICARE

## 2021-07-12 VITALS
TEMPERATURE: 98 F | HEART RATE: 101 BPM | RESPIRATION RATE: 16 BRPM | SYSTOLIC BLOOD PRESSURE: 183 MMHG | DIASTOLIC BLOOD PRESSURE: 79 MMHG

## 2021-07-12 DIAGNOSIS — L89.154 PRESSURE INJURY OF SACRAL REGION, STAGE 4 (HCC): ICD-10-CM

## 2021-07-12 PROCEDURE — 99212 OFFICE O/P EST SF 10 MIN: CPT

## 2021-07-12 PROCEDURE — 99213 OFFICE O/P EST LOW 20 MIN: CPT | Performed by: SURGERY

## 2021-07-12 NOTE — WOUND CARE
07/12/21 1009   Wound Sacral/coccyx #2   Date First Assessed/Time First Assessed: 02/04/21 1056   Wound Approximate Age at First Assessment (Weeks): 13 weeks  Primary Wound Type: Pressure Injury  Location: Sacral/coccyx  Wound Description: #2   Wound Image    Wound Etiology Pressure Stage 3   Dressing Status Intact   Cleansed Cleansed with saline   Dressing/Treatment Hydrocolloid   Wound Length (cm) 0.1 cm   Wound Width (cm) 0.1 cm   Wound Depth (cm) 0.1 cm   Wound Surface Area (cm^2) 0.01 cm^2   Change in Wound Size % 99.96   Wound Volume (cm^3) 0.001 cm^3   Wound Healing % 100   Wound Assessment Eschar dry   Drainage Amount None   Wound Odor None   Juana-Wound/Incision Assessment Intact   Edges Attached edges   Wound Thickness Description Full thickness     Visit Vitals  BP (!) 183/79 (BP 1 Location: Left upper arm, BP Patient Position: At rest;Supine)   Pulse (!) 101   Temp 98 °F (36.7 °C)   Resp 16

## 2021-07-12 NOTE — DISCHARGE INSTRUCTIONS
Discharge Instructions/Wound [address], [address] [facility], [id number]  Telephone: [phone]  WOUND CARE ORDERS:  Sacral area - cleanse with soap and water, rinse, dry thoroughly, leave open to air. No further follow-up needed. TREATMENT ORDERS:  Turn/reposition every 2 hours when in bed, avoid direct pressure on wound site. When sitting, shift position or do seat lifts every 15 minutes. Limit side lying to 30 degree tilt. Limit HOB elevation to 30 degrees. Use speciality pressure relief cushion, mattress as appropriate. Follow diet as prescribed:  [x] Diet as tolerated: [] Calorie Diabetic Diet:Low carb and no Sugar [] No Added Salt:[x] Increase Protein: [] Other:Limit the amount of liquid you are drinking and avoid drinking in between meals              Return Appointment:  [x] Return Appointment: No further follow-up needed. [] Ordered tests:    Electronically signed Bony Levine RN on 7/12/2021 at 10:17 AM     [address] [place] [address] Information: Should you experience any significant changes in your wound(s) or have questions about your wound care, please contact the [address] [place] [address] at [address] [place] [address] 8:00 am - 4:30. If you need help with your wound outside these hours and cannot wait until we are again available, contact your PCP or go to the hospital emergency room. PLEASE NOTE: IF YOU ARE UNABLE TO OBTAIN WOUND SUPPLIES, CONTINUE TO USE THE SUPPLIES YOU HAVE AVAILABLE UNTIL YOU ARE ABLE TO REACH US. IT IS MOST IMPORTANT TO KEEP THE WOUND COVERED AT ALL TIMES.      Physician Signature:_______________________    Date: ___________ Time:  ____________

## 2021-07-12 NOTE — PROGRESS NOTES
HISTORY OF PRESENT ILLNESS:  The patient is a 70-year-old woman who is referred to the [address] [place] [address] Road regarding ulceration in right leg and sacral ulcer.  The patient had been hospitalized late last year at [facility] for treatment of lower extremity cellulitis.  She had been hospitalized at [facility] from 12/01/2020 to 12/10/2020 because of hypertension out of control, new onset cardiomyopathy with ejection fraction of 35%-40%, lower extremity cellulitis, acute kidney injury, and pneumonia on chest x-ray.     Leg ulcer has healed.     The patient after discharge went to a rehabilitation facility for a time.  She is now at home and has  home health care with home health nurses.  She is ambulatory.  She reports good po intake.     The patient was first seen at [facility] on 2/4/2021.     The patient has history of prediabetes. Sukumar Schreiber is reported to have had improvement in ejection fraction to 50% after treatment of her hypertension.  The patient can ambulate short distances  inside her house. Sukumar Schreiber has had falls.  Her appetite is improving.  She is not short of breath with activities that she carries out. Sukumar Schreiber is not short of breath lying down in her bed at night.  She has no anginal chest pain.  She was felt to have nonischemic cardiomyopathy.     She has incontinence at night.     The patient has no history of smoking.     Previous dressing of packing and foam had to be changed 1-2 times per day because of incontinence.  This has not proved practical.     Prior dressing:  For the sacral wound  -  1/4-inch iodoform packing into the deep portion of the wound, then Aquacell AG covering the entire wound complex, covered by ABD and tape.  Packing and dressing to be changed by family daily and prn.     Prior dressing:   Dressing for the sacral wound will consist of Aquacell AG (or Aquacell rope) packed into wound, covered by ABD.  Packing and dressing to be changed by family daily and prn.  Home Health will change 3 times per week.     Current dressing:   Dressing for the sacral wound will consist of Aquacell AG sheet,  covered by bordered super absorbant pad.  Dressing to be changed by family daily and prn.  Home Health will change 3 times per week.        The patient's reported weight  141 pounds with height 5 feet 4 inches.     PHYSICAL EXAMINATION:     GENERAL:  The patient is an alert woman in no acute distress.  She appears stronger.     EXTREMITIES:  (Prior examination of the lower extremities on the left revealed 2+ dorsalis pedis pulse.  There was trace pitting edema in left lower extremity from the knee distally.  No ulcers were present in the left lower extremity).    (Prior examination of the right lower extremity revealed 2+ dorsalis pedis pulse.  There was trace pitting edema in the right lower extremity. No ulcer).      Examination of the sacral region revealed the ulcer site is entirely healed.       Wound healed.       Continue to avoid pressure on the sacral site, as patient had no pain at the ulcer site when it was active.     She sits on a sofa with U shaped cushion.     No dressing needed.     No follow up appointment needed.       FINAL DIAGNOSES:  Stage IV sacral pressure ulcer (healed), urinary incontinence, congestive heart failure.      Q19.217     Olga Pineda MD

## 2021-07-12 NOTE — PROGRESS NOTES
Pressure Injury Interventions: Pt to continue with Q2hr turning/repositioning and use of pressure relief cushion when sitting.

## 2021-07-14 RX ORDER — AMLODIPINE BESYLATE 10 MG/1
TABLET ORAL
Qty: 90 TABLET | Refills: 0 | Status: SHIPPED | OUTPATIENT
Start: 2021-07-14 | End: 2021-09-09

## 2021-07-14 RX ORDER — ASPIRIN 81 MG/1
TABLET ORAL
Qty: 90 TABLET | Refills: 0 | Status: SHIPPED | OUTPATIENT
Start: 2021-07-14 | End: 2021-11-08 | Stop reason: SDUPTHER

## 2021-07-29 RX ORDER — HYDRALAZINE HYDROCHLORIDE 25 MG/1
TABLET, FILM COATED ORAL
Qty: 180 TABLET | Refills: 0 | Status: SHIPPED | OUTPATIENT
Start: 2021-07-29 | End: 2021-08-02 | Stop reason: SDUPTHER

## 2021-08-02 RX ORDER — HYDRALAZINE HYDROCHLORIDE 25 MG/1
TABLET, FILM COATED ORAL
Qty: 180 TABLET | Refills: 0 | Status: SHIPPED | OUTPATIENT
Start: 2021-08-02 | End: 2021-08-10 | Stop reason: SDUPTHER

## 2021-08-10 ENCOUNTER — OFFICE VISIT (OUTPATIENT)
Dept: INTERNAL MEDICINE CLINIC | Age: 76
End: 2021-08-10
Payer: MEDICARE

## 2021-08-10 VITALS
HEART RATE: 83 BPM | OXYGEN SATURATION: 99 % | DIASTOLIC BLOOD PRESSURE: 71 MMHG | RESPIRATION RATE: 18 BRPM | TEMPERATURE: 98.2 F | BODY MASS INDEX: 26.12 KG/M2 | SYSTOLIC BLOOD PRESSURE: 143 MMHG | WEIGHT: 153 LBS | HEIGHT: 64 IN

## 2021-08-10 DIAGNOSIS — G62.9 NEUROPATHY: ICD-10-CM

## 2021-08-10 DIAGNOSIS — M79.672 PAIN IN BOTH FEET: ICD-10-CM

## 2021-08-10 DIAGNOSIS — E11.22 TYPE 2 DIABETES MELLITUS WITH STAGE 2 CHRONIC KIDNEY DISEASE, WITHOUT LONG-TERM CURRENT USE OF INSULIN (HCC): ICD-10-CM

## 2021-08-10 DIAGNOSIS — I10 ESSENTIAL HYPERTENSION: Primary | ICD-10-CM

## 2021-08-10 DIAGNOSIS — N18.2 TYPE 2 DIABETES MELLITUS WITH STAGE 2 CHRONIC KIDNEY DISEASE, WITHOUT LONG-TERM CURRENT USE OF INSULIN (HCC): ICD-10-CM

## 2021-08-10 DIAGNOSIS — E78.2 MIXED HYPERLIPIDEMIA: ICD-10-CM

## 2021-08-10 DIAGNOSIS — I50.20 HFREF (HEART FAILURE WITH REDUCED EJECTION FRACTION) (HCC): ICD-10-CM

## 2021-08-10 DIAGNOSIS — M79.671 PAIN IN BOTH FEET: ICD-10-CM

## 2021-08-10 LAB
GLUCOSE POC: 128 MG/DL
HBA1C MFR BLD HPLC: 6.5 %

## 2021-08-10 PROCEDURE — 82962 GLUCOSE BLOOD TEST: CPT | Performed by: NURSE PRACTITIONER

## 2021-08-10 PROCEDURE — G8419 CALC BMI OUT NRM PARAM NOF/U: HCPCS | Performed by: NURSE PRACTITIONER

## 2021-08-10 PROCEDURE — G8536 NO DOC ELDER MAL SCRN: HCPCS | Performed by: NURSE PRACTITIONER

## 2021-08-10 PROCEDURE — G8427 DOCREV CUR MEDS BY ELIG CLIN: HCPCS | Performed by: NURSE PRACTITIONER

## 2021-08-10 PROCEDURE — G8399 PT W/DXA RESULTS DOCUMENT: HCPCS | Performed by: NURSE PRACTITIONER

## 2021-08-10 PROCEDURE — 99214 OFFICE O/P EST MOD 30 MIN: CPT | Performed by: NURSE PRACTITIONER

## 2021-08-10 PROCEDURE — 1090F PRES/ABSN URINE INCON ASSESS: CPT | Performed by: NURSE PRACTITIONER

## 2021-08-10 PROCEDURE — G8753 SYS BP > OR = 140: HCPCS | Performed by: NURSE PRACTITIONER

## 2021-08-10 PROCEDURE — G8754 DIAS BP LESS 90: HCPCS | Performed by: NURSE PRACTITIONER

## 2021-08-10 PROCEDURE — 83036 HEMOGLOBIN GLYCOSYLATED A1C: CPT | Performed by: NURSE PRACTITIONER

## 2021-08-10 PROCEDURE — 1101F PT FALLS ASSESS-DOCD LE1/YR: CPT | Performed by: NURSE PRACTITIONER

## 2021-08-10 PROCEDURE — G8432 DEP SCR NOT DOC, RNG: HCPCS | Performed by: NURSE PRACTITIONER

## 2021-08-10 RX ORDER — DEXTROMETHORPHAN HYDROBROMIDE, GUAIFENESIN 5; 100 MG/5ML; MG/5ML
650 LIQUID ORAL
Qty: 60 TABLET | Refills: 1 | Status: SHIPPED | OUTPATIENT
Start: 2021-08-10

## 2021-08-10 RX ORDER — CLONIDINE HYDROCHLORIDE 0.2 MG/1
TABLET ORAL
Qty: 90 TABLET | Refills: 0 | Status: SHIPPED | OUTPATIENT
Start: 2021-08-10 | End: 2021-08-16

## 2021-08-10 RX ORDER — HYDRALAZINE HYDROCHLORIDE 25 MG/1
25 TABLET, FILM COATED ORAL 3 TIMES DAILY
Qty: 270 TABLET | Refills: 0 | Status: SHIPPED | OUTPATIENT
Start: 2021-08-10 | End: 2021-09-21 | Stop reason: DRUGHIGH

## 2021-08-10 NOTE — PROGRESS NOTES
Chief Complaint   Patient presents with    Follow-up    Request For New Medication     arthritis both feet, since January, pt states pain is constant        1. Have you been to the ER, urgent care clinic since your last visit? Hospitalized since your last visit? No    2. Have you seen or consulted any other health care providers outside of the [address] [place] [address] since your last visit? Include any pap smears or colon screening.  Yes Where: [facility] Reason for visit: foot pain

## 2021-08-10 NOTE — PROGRESS NOTES
Subjective: (As above and below)     Chief Complaint   Patient presents with    Follow-up    Request For New Medication     arthritis both feet, since January, pt states pain is constant      Linh Hernandez is a 66y.o. year old female who presents for     Diabetic Review of Systems - medication compliance: compliant all of the time, diabetic diet compliance: compliant most of the time, home glucose monitoring: is performed regularly. Hypertension ROS:  taking medications as instructed, no medication side effects noted, no TIAs, no chest pain on exertion, no dyspnea on exertion, no swelling of ankles    BP Readings from Last 3 Encounters:   08/10/21 (!) 143/71   07/12/21 (!) 183/79   06/17/21 (!) 196/77       Hyperlipidemia; tolerating statin    CHF; minimal lower ext edema, no cp or sob  Follows w/ cardiology    Wt Readings from Last 3 Encounters:   08/10/21 153 lb (69.4 kg)   05/18/21 141 lb (64 kg)   05/05/21 134 lb (60.8 kg)     Neuropathy lyrica helps a bit, does make her sleepy    Arthritis to her feet, follows w/ podiatry, was told little more to be done  nsaids contraindicated d/t renal fxn  She does not have tylenol  Upon discussion she does say then when she wears her compression stockings pain is better    Reviewed PmHx, RxHx, FmHx, SocHx, AllgHx and updated in chart.   Family History   Problem Relation Age of Onset    Diabetes Mother     Diabetes Father     Diabetes Son        Past Medical History:   Diagnosis Date    Cardiomyopathy ([facility] 12/3/2020    HFrEF (heart failure with reduced ejection fraction) ([address] 12/3/2020    Hypertension, uncontrolled 12/3/2020    Mobitz type 2 second degree atrioventricular block 51/7/8847    Systolic heart failure ([address] 12/3/2020      Social History     Socioeconomic History    Marital status: SINGLE     Spouse name: Not on file    Number of children: 2    Years of education: Not on file    Highest education level: Not on file   Tobacco Use    Smoking status: Never Smoker    Smokeless tobacco: Never Used   Vaping Use    Vaping Use: Never used   Substance and Sexual Activity    Alcohol use: Never    Drug use: Never    Sexual activity: Not Currently     Partners: Male   Other Topics Concern     Social Determinants of Health     Financial Resource Strain:     Difficulty of Paying Living Expenses:    Food Insecurity:     Worried About Running Out of Food in the Last Year:     Ran Out of Food in the Last Year:    Transportation Needs:     Lack of Transportation (Medical):  Lack of Transportation (Non-Medical):    Physical Activity:     Days of Exercise per Week:     Minutes of Exercise per Session:    Stress:     Feeling of Stress :    Social Connections:     Frequency of Communication with Friends and Family:     Frequency of Social Gatherings with Friends and Family:     Attends [employer] Services:     Active Member of Clubs or Organizations:     Attends Club or Organization Meetings:     Marital Status:           Current Outpatient Medications   Medication Sig    hydrALAZINE (APRESOLINE) 25 mg tablet TAKE 1 TABLET BY MOUTH TWICE DAILY    amLODIPine (NORVASC) 10 mg tablet TAKE 1 TABLET BY MOUTH DAILY    aspirin delayed-release 81 mg tablet TAKE 1 TABLET BY MOUTH ONCE DAILY.  pregabalin (LYRICA) 50 mg capsule TAKE 1 CAPSULE BY MOUTH TWICE DAILY. MAX DAILY AMOUNT: 100 MG    cloNIDine HCL (CATAPRES) 0.2 mg tablet Take 1 Tablet by mouth three (3) times daily. TAKE 1 TABLET BY MOUTH THREE TIMES DAILY    calcium carbonate-vitamin D3 (Calcium 600 with Vitamin D3) 600 mg(1,500mg) -500 unit cap Take 1 Each by mouth daily.  lisinopriL (PRINIVIL, ZESTRIL) 2.5 mg tablet Take 1 Tab by mouth daily.  flash glucose scanning reader (FreeStyle Clary 14 Day Burna) misc 1 Each by Does Not Apply route Once every 2 weeks.  flash glucose sensor (FreeStyle Clary 14 Day Sensor) kit 1 Each by Does Not Apply route Once every 2 weeks.     glucose blood VI test strips (ASCENSIA AUTODISC VI, ONE TOUCH ULTRA TEST VI) strip Check sugar once daily fasting.  Blood-Glucose Meter monitoring kit Check sugar once daily fasting. E11.9    lancets misc Use as directed. Dx: E11.9 Check sugar once daily fasting    alcohol swabs padm 1 Each by Apply Externally route daily. For checking sugar    acetaminophen (TYLENOL) 650 mg TbER Take 650 mg by mouth as needed for Pain.  polyethylene glycol (Miralax) 17 gram packet Take 1 Packet by mouth daily. constipation    Wheat Dextrin (Benefiber Clear) 3 gram/3.5 gram pwpk Take 1 Package by mouth daily as needed.  ammonium lactate (LAC-HYDRIN) 12 % lotion Apply 1 Each to affected area as needed (dry skin). rub in to affected area well     No current facility-administered medications for this visit. Review of Systems:   Constitutional:    Negative for fever and chills, negative diaphoresis. HEENT:              Negative for neck pain and stiffness. Eyes:                  Negative for visual disturbance, itching, redness or discharge. Respiratory:        Negative for cough and shortness of breath. Cardiovascular:  Negative for chest pain and palpitations. Gastrointestinal: Negative for nausea, vomiting, abdominal pain, diarrhea or constipation. Genitourinary:     Negative for dysuria and frequency. Musculoskeletal: Negative for falls, tenderness and swelling. Skin:                    Negative for rash, masses or lesions. Neurological:       Negative for dizzyness, seizure, loss of consciousness, weakness and numbness.      Objective:     Vitals:    08/10/21 1037   BP: (!) 143/71   Pulse: 83   Resp: 18   Temp: 98.2 °F (36.8 °C)   TempSrc: Temporal   SpO2: 99%   Weight: 153 lb (69.4 kg)   Height: 5' 4\" (1.626 m)       Results for orders placed or performed during the hospital encounter of 06/25/21   ANKLE BRACHIAL INDEX   Result Value Ref Range    Left arm  mmHg    Right arm  mmHg    Left posterior tibial 185 mmHg    Right posterior tibial 188 mmHg    Left anterior tibial 196 mmHg    Right anterior tibial 183 mmHg    Left CARISSA 1.14     Right CARISSA 1.09     Left toe pressure 158 mmHg    Right toe pressure 128 mmHg    Left TBI 0.92     Right TBI 0.74      Results for orders placed or performed in visit on 08/10/21   AMB POC GLUCOSE BLOOD, BY GLUCOSE MONITORING DEVICE   Result Value Ref Range    Glucose  MG/DL   AMB POC HEMOGLOBIN A1C   Result Value Ref Range    Hemoglobin A1c (POC) 6.5 %         Gen: Oriented to person, place and time and well-developed, well-nourished and in no distress. HEENT:    Head: normocephalic and atraumatic. Eyes:  EOM are normal. Pupils equal and round. Neck:  Normal range of motion. Neck supple. Cardiovascular: normal rate, regular rhythm and normal heart sounds. Pulmonary/Chest:  Effort normal and breath sounds normal.  No respiratory distress. No wheezes, no rales. Abdominal: soft, normal  bowel sounds. Musculoskeletal:  No edema, no tenderness. No calf tenderness or edema. Neurological:  Alert, oriented to person, place and time. Skin: skin is warm and dry. Assessment/ Plan:       1. Essential hypertension  Dose increase  - hydrALAZINE (APRESOLINE) 25 mg tablet; Take 1 Tablet by mouth three (3) times daily. TAKE 1 TABLET BY MOUTH TWICE DAILY  Dispense: 270 Tablet; Refill: 0  - METABOLIC PANEL, BASIC; Future    2. Mixed hyperlipidemia      3. Type 2 diabetes mellitus with stage 2 chronic kidney disease, without long-term current use of insulin ([facility])  Good!  - AMB POC GLUCOSE BLOOD, BY GLUCOSE MONITORING DEVICE  - AMB POC HEMOGLOBIN A1C  - AMB POC URINE, MICROALBUMIN, SEMIQUANT (3 RESULTS)    4. HFrEF (heart failure with reduced ejection fraction) ([facility] [address]      5. Neuropathy      6. Pain in both feet  Compression stockings  - acetaminophen (TYLENOL) 650 mg TbER; Take 1 Tablet by mouth three (3) times daily as needed for Pain.   Dispense: 60 Tablet; Refill: 1        I have discussed the diagnosis with the patient and the intended plan as seen in the above orders. The patient has received an after-visit summary and questions were answered concerning future plans. Pt conveyed understanding of plan. Medication Side Effects and Warnings were discussed with patient: yes  Patient Labs were reviewed: yes  Patient Past Records were reviewed:  yes    [facility].  Danis Boudreaux NP

## 2021-08-16 RX ORDER — CLONIDINE HYDROCHLORIDE 0.2 MG/1
TABLET ORAL
Qty: 90 TABLET | Refills: 0 | Status: SHIPPED | OUTPATIENT
Start: 2021-08-16 | End: 2021-09-09

## 2021-09-09 RX ORDER — AMLODIPINE BESYLATE 10 MG/1
TABLET ORAL
Qty: 90 TABLET | Refills: 0 | Status: SHIPPED | OUTPATIENT
Start: 2021-09-09 | End: 2021-11-08 | Stop reason: SDUPTHER

## 2021-09-09 RX ORDER — CLONIDINE HYDROCHLORIDE 0.2 MG/1
TABLET ORAL
Qty: 90 TABLET | Refills: 0 | Status: SHIPPED | OUTPATIENT
Start: 2021-09-09 | End: 2021-10-12

## 2021-09-21 ENCOUNTER — CLINICAL SUPPORT (OUTPATIENT)
Dept: INTERNAL MEDICINE CLINIC | Age: 76
End: 2021-09-21

## 2021-09-21 VITALS
TEMPERATURE: 98.6 F | OXYGEN SATURATION: 98 % | HEART RATE: 97 BPM | SYSTOLIC BLOOD PRESSURE: 188 MMHG | DIASTOLIC BLOOD PRESSURE: 74 MMHG

## 2021-09-21 DIAGNOSIS — Z01.30 BP CHECK: Primary | ICD-10-CM

## 2021-09-21 RX ORDER — HYDRALAZINE HYDROCHLORIDE 50 MG/1
50 TABLET, FILM COATED ORAL 3 TIMES DAILY
Qty: 90 TABLET | Refills: 1 | Status: SHIPPED | OUTPATIENT
Start: 2021-09-21 | End: 2021-09-21

## 2021-09-21 RX ORDER — HYDRALAZINE HYDROCHLORIDE 25 MG/1
25 TABLET, FILM COATED ORAL 3 TIMES DAILY
Qty: 90 TABLET | Refills: 0
Start: 2021-09-21 | End: 2021-11-09

## 2021-10-05 ENCOUNTER — CLINICAL SUPPORT (OUTPATIENT)
Dept: INTERNAL MEDICINE CLINIC | Age: 76
End: 2021-10-05

## 2021-10-05 VITALS
WEIGHT: 164 LBS | SYSTOLIC BLOOD PRESSURE: 144 MMHG | OXYGEN SATURATION: 96 % | DIASTOLIC BLOOD PRESSURE: 71 MMHG | TEMPERATURE: 98.4 F | BODY MASS INDEX: 28 KG/M2 | HEIGHT: 64 IN | HEART RATE: 89 BPM | RESPIRATION RATE: 18 BRPM

## 2021-10-05 DIAGNOSIS — Z01.30 BP CHECK: Primary | ICD-10-CM

## 2021-10-05 NOTE — PROGRESS NOTES
Chief Complaint   Patient presents with    Blood Pressure Check       Visit Vitals  BP (!) 144/71 (BP 1 Location: Right arm, BP Patient Position: Sitting, BP Cuff Size: Adult)   Pulse 89   Temp 98.4 °F (36.9 °C) (Temporal)   Resp 18   Ht 5' 4\" (1.626 m)   Wt 164 lb (74.4 kg)   SpO2 96%   BMI 28.15 kg/m²       Provider notified, no changes made. Pt will follow up in February, appt scheduled.

## 2021-10-12 RX ORDER — CLONIDINE HYDROCHLORIDE 0.2 MG/1
TABLET ORAL
Qty: 90 TABLET | Refills: 0 | Status: SHIPPED | OUTPATIENT
Start: 2021-10-12 | End: 2021-11-08 | Stop reason: SDUPTHER

## 2021-11-07 NOTE — PROGRESS NOTES
[facility] @ [address], [place]  Subjective/HPI:     Natalie Cottrell is a 64 y.o. female history of nonischemic cardiomyopathy resolved, hypertension is here for routine f/u. The patient denies chest pain/ shortness of breath, orthopnea, PND, LE edema, palpitations, syncope, presyncope or fatigue. Patient reports feeling well in her usual state of health able to perform all her usual ADLs without any limitations. Tolerating current antihypertensive therapy without side effects. 5/5/21 Visit  1. Nonischemic cardiomyopathy: 12/20 35-40% in setting of uncontrolled hypertension and pneumonia, has recovered 4/26/2021 EF 60-65%. Continue low-dose ACE inhibitor, no beta-blocker secondary to previous Mobitz II heart block. Recheck baseline proBNP now with a normal ejection fraction. 2.  Hypertension: Recently started on hydralazine 25 mg twice a day. First dose last night, did not take this morning. Repeat blood pressure at end of visit 132/74. Recommend obtaining home blood pressure monitor, has home health nurses coming 3 times a week till the end of the month who will also check her blood pressure. Call if systolic readings are elevated. Screening: We will check lipid panel, has mildly elevated A1c with repeat labs pending from primary care. Follow-up with cardiology in 6 months     4/26/2021 ECHO  Interpretation Summary     · LV: Estimated LVEF is 60 - 65%. Normal cavity size, wall thickness and systolic function (ejection fraction normal).     Echo Findings     Left Ventricle Normal cavity size, wall thickness and systolic function (ejection fraction normal). The estimated EF is 60 - 65%. Wall Scoring The left ventricular wall motion is normal.             Left Atrium Normal cavity size. Right Ventricle Normal cavity size and global systolic function. Right Atrium Normal cavity size. Aortic Valve Trileaflet valve structure.  Aortic valve sclerosis. Normal aortic leaflet mobility. Mitral Valve Normal valve structure. Tricuspid Valve Normal valve structure. Pulmonic Valve Normal valve structure. Aorta Normal aortic root. Pericardium No evidence of pericardial effusion       PCP Provider  Rohit Razo, BONG  Past Medical History:   Diagnosis Date    Cardiomyopathy ([facility] [address] 12/3/2020    HFrEF (heart failure with reduced ejection fraction) ([facility] [address] 12/3/2020    Hypertension, uncontrolled 12/3/2020    Mobitz type 2 second degree atrioventricular block 81/1/8119    Systolic heart failure ([facility] [address] 12/3/2020      No past surgical history on file. Allergies   Allergen Reactions    Seafood Hives    Drug Ingredient [Cetirizine] Unknown (comments)     Mistaken entry      Family History   Problem Relation Age of Onset    Diabetes Mother     Diabetes Father     Diabetes Son       Current Outpatient Medications   Medication Sig    cloNIDine HCL (CATAPRES) 0.2 mg tablet TAKE ONE TABLET BY MOUTH THREE TIMES DAILY    amLODIPine (NORVASC) 10 mg tablet TAKE 1 TABLET BY MOUTH DAILY    acetaminophen (TYLENOL) 650 mg TbER Take 1 Tablet by mouth three (3) times daily as needed for Pain.  aspirin delayed-release 81 mg tablet TAKE 1 TABLET BY MOUTH ONCE DAILY.  pregabalin (LYRICA) 50 mg capsule TAKE 1 CAPSULE BY MOUTH TWICE DAILY. MAX DAILY AMOUNT: 100 MG    flash glucose scanning reader (FreeStyle Clary 14 Day [place]) misc 1 Each by Does Not Apply route Once every 2 weeks.  flash glucose sensor (FreeStyle [place] 14 Day Sensor) kit 1 Each by Does Not Apply route Once every 2 weeks.  glucose blood VI test strips (ASCENSIA AUTODISC VI, ONE TOUCH ULTRA TEST VI) strip Check sugar once daily fasting.  calcium carbonate-vitamin D3 (Calcium 600 with Vitamin D3) 600 mg(1,500mg) -500 unit cap Take 1 Each by mouth daily.  Blood-Glucose Meter monitoring kit Check sugar once daily fasting. E11.9    lancets misc Use as directed.  Dx: E11.9 Check sugar once daily fasting    alcohol swabs padm 1 Each by Apply Externally route daily. For checking sugar    lisinopriL (PRINIVIL, ZESTRIL) 2.5 mg tablet Take 1 Tab by mouth daily.  ammonium lactate (LAC-HYDRIN) 12 % lotion Apply 1 Each to affected area as needed (dry skin). rub in to affected area well    hydrALAZINE (APRESOLINE) 50 mg tablet TAKE 1 TABLET BY MOUTH THREE TIMES DAILY    hydrALAZINE (APRESOLINE) 25 mg tablet Take 1 Tablet by mouth three (3) times daily.  polyethylene glycol (Miralax) 17 gram packet Take 1 Packet by mouth daily. constipation    Wheat Dextrin (Benefiber Clear) 3 gram/3.5 gram pwpk Take 1 Package by mouth daily as needed. (Patient not taking: Reported on 11/8/2021)     No current facility-administered medications for this visit.       Vitals:    11/08/21 1053   BP: 126/65   Pulse: 84   Resp: 19   SpO2: 99%   Weight: 157 lb 12.8 oz (71.6 kg)   Height: 5' 6\" (1.676 m)     Social History     Socioeconomic History    Marital status: SINGLE     Spouse name: Not on file    Number of children: 2    Years of education: Not on file    Highest education level: Not on file   Occupational History    Not on file   Tobacco Use    Smoking status: Never Smoker    Smokeless tobacco: Never Used   Vaping Use    Vaping Use: Never used   Substance and Sexual Activity    Alcohol use: Never    Drug use: Never    Sexual activity: Not Currently     Partners: Male   Other Topics Concern     Service Not Asked    Blood Transfusions Not Asked    Caffeine Concern Not Asked    Occupational Exposure Not Asked    Hobby Hazards Not Asked    Sleep Concern Not Asked    Stress Concern Not Asked    Weight Concern Not Asked    Special Diet Not Asked    Back Care Not Asked    Exercise Not Asked    Bike Helmet Not Asked   [address] Not Asked    Self-Exams Not Asked   Social History Narrative    Not on file     Social Determinants of Health     Financial Resource Strain:     Difficulty of Paying Living Expenses: Not on file   Food Insecurity:     Worried About Running Out of Food in the Last Year: Not on file    Ran Out of Food in the Last Year: Not on file   Transportation Needs:     Lack of Transportation (Medical): Not on file    Lack of Transportation (Non-Medical): Not on file   Physical Activity:     Days of Exercise per Week: Not on file    Minutes of Exercise per Session: Not on file   Stress:     Feeling of Stress : Not on file   Social Connections:     Frequency of Communication with Friends and Family: Not on file    Frequency of Social Gatherings with Friends and Family: Not on file    Attends Denominational Services: Not on file    Active Member of [address] [place] [address] or Organizations: Not on file    Attends Club or Organization Meetings: Not on file    Marital Status: Not on file   Intimate Partner Violence:     Fear of Current or Ex-Partner: Not on file    Emotionally Abused: Not on file    Physically Abused: Not on file    Sexually Abused: Not on file   Housing Stability:     Unable to Pay for Housing in the Last Year: Not on file    Number of [place] in the Last Year: Not on file    Unstable Housing in the Last Year: Not on file       I have reviewed the nurses notes, vitals, problem list, allergy list, medical history, family, social history and medications. Review of Symptoms  11 systems reviewed, negative other than as stated in the HPI      Physical Exam:      General: Well developed, in no acute distress, cooperative and alert  HEENT: No carotid bruits, no JVD, trach is midline. Neck Supple,   Heart:  Normal S1/S2 negative S3 or S4. Regular, no murmur, gallop or rub. Respiratory: Clear bilaterally x 4, no wheezing or rales  Abdomen:   Soft, non-tender, no masses, bowel sounds are active. Extremities:  No edema, normal cap refill, no cyanosis, atraumatic.    Neuro: A&Ox3, speech clear, gait stable with rolling walker  Skin: Skin color is normal. No rashes or lesions. Non diaphoretic  Vascular: 2+ pulses symmetric in all extremities    Cardiographics    ECG: Normal sinus rhythm        Cardiology Labs:  Lab Results   Component Value Date/Time    Cholesterol, total 195 05/05/2021 11:09 AM    HDL Cholesterol 99 05/05/2021 11:09 AM    LDL, calculated 74.8 05/05/2021 11:09 AM    Triglyceride 106 05/05/2021 11:09 AM    CHOL/HDL Ratio 2.0 05/05/2021 11:09 AM       Lab Results   Component Value Date/Time    Sodium 139 08/10/2021 11:26 AM    Potassium 4.0 08/10/2021 11:26 AM    Chloride 107 08/10/2021 11:26 AM    CO2 26 08/10/2021 11:26 AM    Anion gap 6 08/10/2021 11:26 AM    Glucose 108 (H) 08/10/2021 11:26 AM    BUN 37 (H) 08/10/2021 11:26 AM    Creatinine 1.59 (H) 08/10/2021 11:26 AM    BUN/Creatinine ratio 23 (H) 08/10/2021 11:26 AM    GFR est AA 38 (L) 08/10/2021 11:26 AM    GFR est non-AA 31 (L) 08/10/2021 11:26 AM    Calcium 9.4 08/10/2021 11:26 AM    Bilirubin, total 0.8 12/09/2020 03:48 AM    Alk. phosphatase 96 12/09/2020 03:48 AM    Protein, total 7.1 12/09/2020 03:48 AM    Albumin 2.5 (L) 12/09/2020 03:48 AM    Globulin 4.6 (H) 12/09/2020 03:48 AM    A-G Ratio 0.5 (L) 12/09/2020 03:48 AM    ALT (SGPT) 23 12/09/2020 03:48 AM           Assessment:     Assessment:     Diagnoses and all orders for this visit:    1. HFrEF (heart failure with reduced ejection fraction) ([facility])  -     AMB POC EKG ROUTINE W/ 12 LEADS, INTER & REP    2. Mobitz type 2 second degree atrioventricular block        ICD-10-CM ICD-9-CM    1. HFrEF (heart failure with reduced ejection fraction) ([facility])  I50.20 428.20 AMB POC EKG ROUTINE W/ 12 LEADS, INTER & REP   2. Mobitz type 2 second degree atrioventricular block  I44.1 426.12      Orders Placed This Encounter    AMB POC EKG ROUTINE W/ 12 LEADS, INTER & REP     Order Specific Question:   Reason for Exam:     Answer:   routine        Plan:     1.   Nonischemic cardiomyopathy: 12/20 35-40% in setting of uncontrolled hypertension and pneumonia, has recovered 4/26/2021 EF 60-65%. Continue low-dose ACE inhibitor, no beta-blocker secondary to previous Mobitz II heart block. 2.  Hypertension: Normotensive 126/65 continue current medication. Calling pharmacy to confirm dosing of hydralazine as unable to read labels on bottle for refill. 3.  History of second-degree heart block: While on alva agents (beta-blocker), advised to avoid alva agent presents sinus rhythm today. Stable from cardiac standpoint follow-up in 6 months  Ina Booth NP      Please note that this dictation was completed with Appydrink, the computer voice recognition software. Quite often unanticipated grammatical, syntax, homophones, and other interpretive errors are inadvertently transcribed by the computer software. Please disregard these errors. Please excuse any errors that have escaped final proofreading. Thank you.

## 2021-11-08 ENCOUNTER — OFFICE VISIT (OUTPATIENT)
Dept: CARDIOLOGY CLINIC | Age: 76
End: 2021-11-08
Payer: MEDICARE

## 2021-11-08 VITALS
HEART RATE: 84 BPM | RESPIRATION RATE: 19 BRPM | DIASTOLIC BLOOD PRESSURE: 65 MMHG | HEIGHT: 66 IN | SYSTOLIC BLOOD PRESSURE: 126 MMHG | WEIGHT: 157.8 LBS | OXYGEN SATURATION: 99 % | BODY MASS INDEX: 25.36 KG/M2

## 2021-11-08 DIAGNOSIS — I44.1 MOBITZ TYPE 2 SECOND DEGREE ATRIOVENTRICULAR BLOCK: ICD-10-CM

## 2021-11-08 DIAGNOSIS — I50.20 HFREF (HEART FAILURE WITH REDUCED EJECTION FRACTION) (HCC): Primary | ICD-10-CM

## 2021-11-08 PROCEDURE — G8419 CALC BMI OUT NRM PARAM NOF/U: HCPCS | Performed by: NURSE PRACTITIONER

## 2021-11-08 PROCEDURE — G8399 PT W/DXA RESULTS DOCUMENT: HCPCS | Performed by: NURSE PRACTITIONER

## 2021-11-08 PROCEDURE — 1101F PT FALLS ASSESS-DOCD LE1/YR: CPT | Performed by: NURSE PRACTITIONER

## 2021-11-08 PROCEDURE — G8427 DOCREV CUR MEDS BY ELIG CLIN: HCPCS | Performed by: NURSE PRACTITIONER

## 2021-11-08 PROCEDURE — G8752 SYS BP LESS 140: HCPCS | Performed by: NURSE PRACTITIONER

## 2021-11-08 PROCEDURE — 1090F PRES/ABSN URINE INCON ASSESS: CPT | Performed by: NURSE PRACTITIONER

## 2021-11-08 PROCEDURE — G8432 DEP SCR NOT DOC, RNG: HCPCS | Performed by: NURSE PRACTITIONER

## 2021-11-08 PROCEDURE — G8754 DIAS BP LESS 90: HCPCS | Performed by: NURSE PRACTITIONER

## 2021-11-08 PROCEDURE — 99214 OFFICE O/P EST MOD 30 MIN: CPT | Performed by: NURSE PRACTITIONER

## 2021-11-08 PROCEDURE — G8536 NO DOC ELDER MAL SCRN: HCPCS | Performed by: NURSE PRACTITIONER

## 2021-11-08 PROCEDURE — 93000 ELECTROCARDIOGRAM COMPLETE: CPT | Performed by: NURSE PRACTITIONER

## 2021-11-08 RX ORDER — CLONIDINE HYDROCHLORIDE 0.2 MG/1
TABLET ORAL
Qty: 270 TABLET | Refills: 3 | Status: SHIPPED | OUTPATIENT
Start: 2021-11-08

## 2021-11-08 RX ORDER — LISINOPRIL 2.5 MG/1
2.5 TABLET ORAL DAILY
Qty: 90 TABLET | Refills: 3 | Status: SHIPPED | OUTPATIENT
Start: 2021-11-08 | End: 2022-10-10

## 2021-11-08 RX ORDER — AMLODIPINE BESYLATE 10 MG/1
10 TABLET ORAL DAILY
Qty: 90 TABLET | Refills: 3 | Status: SHIPPED | OUTPATIENT
Start: 2021-11-08

## 2021-11-08 RX ORDER — ASPIRIN 81 MG/1
81 TABLET ORAL DAILY
Qty: 90 TABLET | Refills: 99 | Status: SHIPPED | OUTPATIENT
Start: 2021-11-08 | End: 2021-11-12

## 2021-11-08 NOTE — PROGRESS NOTES
1. Have you been to the ER, urgent care clinic since your last visit? Hospitalized since your last visit? No    2. Have you seen or consulted any other health care providers outside of the [address] [place] [address] since your last visit? Include any pap smears or colon screening. No      Chief Complaint   Patient presents with    Follow-up     6 mo appt. Patient reported no cardiac symptoms today.

## 2021-11-09 ENCOUNTER — TELEPHONE (OUTPATIENT)
Dept: CARDIOLOGY CLINIC | Age: 76
End: 2021-11-09

## 2021-11-09 DIAGNOSIS — I10 HYPERTENSION, UNCONTROLLED: Primary | ICD-10-CM

## 2021-11-09 RX ORDER — HYDRALAZINE HYDROCHLORIDE 50 MG/1
50 TABLET, FILM COATED ORAL 3 TIMES DAILY
Qty: 90 TABLET | Refills: 3 | Status: CANCELLED | OUTPATIENT
Start: 2021-11-09

## 2021-11-09 RX ORDER — HYDRALAZINE HYDROCHLORIDE 50 MG/1
50 TABLET, FILM COATED ORAL 3 TIMES DAILY
Qty: 270 TABLET | Refills: 3 | Status: SHIPPED | OUTPATIENT
Start: 2021-11-09 | End: 2022-09-22

## 2021-11-09 NOTE — TELEPHONE ENCOUNTER
I called [address], spoke with Alexandra Martini. She confirmed that patient's most current prescription with them was written for Hydralazine 50 mg. No remaining refill left. Requested Prescriptions     Pending Prescriptions Disp Refills    hydrALAZINE (APRESOLINE) 50 mg tablet 90 Tablet 3     Sig: Take 1 Tablet by mouth three (3) times daily. I will route TISHA Sadler        ----- Message from Shahnaz Mcfadden NP sent at 11/8/2021 11:33 AM EST -----  Regarding: med  Could you please call patient's pharmacy, she is on hydralazine however I cannot tell on the bottle if she is taking 25 mg or 50 mg as both are listed in her med rec. They need a refill I just need to confirm which is the dose to remain on and which we need to discontinue from the medical record. Thanks.

## 2021-11-12 RX ORDER — ASPIRIN 81 MG/1
TABLET ORAL
Qty: 90 TABLET | Status: SHIPPED | OUTPATIENT
Start: 2021-11-12 | End: 2022-10-11

## 2021-11-16 DIAGNOSIS — G62.9 NEUROPATHY: ICD-10-CM

## 2021-11-17 RX ORDER — PREGABALIN 50 MG/1
CAPSULE ORAL
Qty: 60 CAPSULE | Refills: 1 | Status: SHIPPED | OUTPATIENT
Start: 2021-11-17 | End: 2021-12-13 | Stop reason: SDUPTHER

## 2021-12-13 DIAGNOSIS — G62.9 NEUROPATHY: ICD-10-CM

## 2021-12-13 NOTE — TELEPHONE ENCOUNTER
BONG Maxwell,         Pt left a voice message requesting a 90 days supply for the Lyrica 50 mg to be able to coordinate with pt's other medications. Please review and prescribe if appropriate. Thank you. BCN: [phone]    Last visit 08/10/2021 NP Sharyle Genre   Next appointment 02/09/2022 NP Sharyle Genre   Previous refill encounter(s)   11/17/2021 Lyrica #60 with 1 refill     No acces to      Requested Prescriptions     Pending Prescriptions Disp Refills    pregabalin (LYRICA) 50 mg capsule 180 Capsule 0     Sig: Take 1 Capsule by mouth two (2) times a day. Max Daily Amount: 100 mg.

## 2021-12-14 RX ORDER — PREGABALIN 50 MG/1
50 CAPSULE ORAL 2 TIMES DAILY
Qty: 180 CAPSULE | Refills: 0 | Status: SHIPPED | OUTPATIENT
Start: 2021-12-14 | End: 2022-02-09 | Stop reason: SDUPTHER

## 2022-02-09 ENCOUNTER — OFFICE VISIT (OUTPATIENT)
Dept: INTERNAL MEDICINE CLINIC | Age: 77
End: 2022-02-09
Payer: MEDICARE

## 2022-02-09 VITALS
HEART RATE: 84 BPM | WEIGHT: 164 LBS | OXYGEN SATURATION: 99 % | TEMPERATURE: 98.2 F | SYSTOLIC BLOOD PRESSURE: 136 MMHG | BODY MASS INDEX: 26.36 KG/M2 | RESPIRATION RATE: 17 BRPM | DIASTOLIC BLOOD PRESSURE: 66 MMHG | HEIGHT: 66 IN

## 2022-02-09 DIAGNOSIS — D64.9 ANEMIA, UNSPECIFIED TYPE: Primary | ICD-10-CM

## 2022-02-09 DIAGNOSIS — Z23 ENCOUNTER FOR IMMUNIZATION: ICD-10-CM

## 2022-02-09 DIAGNOSIS — E11.22 TYPE 2 DIABETES MELLITUS WITH STAGE 2 CHRONIC KIDNEY DISEASE, WITHOUT LONG-TERM CURRENT USE OF INSULIN (HCC): ICD-10-CM

## 2022-02-09 DIAGNOSIS — M79.672 PAIN IN BOTH FEET: ICD-10-CM

## 2022-02-09 DIAGNOSIS — N18.2 TYPE 2 DIABETES MELLITUS WITH STAGE 2 CHRONIC KIDNEY DISEASE, WITHOUT LONG-TERM CURRENT USE OF INSULIN (HCC): ICD-10-CM

## 2022-02-09 DIAGNOSIS — G62.9 NEUROPATHY: ICD-10-CM

## 2022-02-09 DIAGNOSIS — I50.20 HFREF (HEART FAILURE WITH REDUCED EJECTION FRACTION) (HCC): ICD-10-CM

## 2022-02-09 DIAGNOSIS — M79.671 PAIN IN BOTH FEET: ICD-10-CM

## 2022-02-09 LAB
ALBUMIN UR QL STRIP: 150 MG/L
CREATININE, URINE POC: 100 MG/DL
GLUCOSE POC: 147 MG/DL
HBA1C MFR BLD HPLC: 6.1 %
MICROALBUMIN/CREAT RATIO POC: >300 MG/G

## 2022-02-09 PROCEDURE — 82962 GLUCOSE BLOOD TEST: CPT | Performed by: NURSE PRACTITIONER

## 2022-02-09 PROCEDURE — G8432 DEP SCR NOT DOC, RNG: HCPCS | Performed by: NURSE PRACTITIONER

## 2022-02-09 PROCEDURE — G8754 DIAS BP LESS 90: HCPCS | Performed by: NURSE PRACTITIONER

## 2022-02-09 PROCEDURE — 1090F PRES/ABSN URINE INCON ASSESS: CPT | Performed by: NURSE PRACTITIONER

## 2022-02-09 PROCEDURE — G0009 ADMIN PNEUMOCOCCAL VACCINE: HCPCS | Performed by: INTERNAL MEDICINE

## 2022-02-09 PROCEDURE — 1101F PT FALLS ASSESS-DOCD LE1/YR: CPT | Performed by: NURSE PRACTITIONER

## 2022-02-09 PROCEDURE — 82044 UR ALBUMIN SEMIQUANTITATIVE: CPT | Performed by: NURSE PRACTITIONER

## 2022-02-09 PROCEDURE — G8427 DOCREV CUR MEDS BY ELIG CLIN: HCPCS | Performed by: NURSE PRACTITIONER

## 2022-02-09 PROCEDURE — G8536 NO DOC ELDER MAL SCRN: HCPCS | Performed by: NURSE PRACTITIONER

## 2022-02-09 PROCEDURE — G8752 SYS BP LESS 140: HCPCS | Performed by: NURSE PRACTITIONER

## 2022-02-09 PROCEDURE — 90732 PPSV23 VACC 2 YRS+ SUBQ/IM: CPT | Performed by: INTERNAL MEDICINE

## 2022-02-09 PROCEDURE — G8399 PT W/DXA RESULTS DOCUMENT: HCPCS | Performed by: NURSE PRACTITIONER

## 2022-02-09 PROCEDURE — G8419 CALC BMI OUT NRM PARAM NOF/U: HCPCS | Performed by: NURSE PRACTITIONER

## 2022-02-09 PROCEDURE — 83036 HEMOGLOBIN GLYCOSYLATED A1C: CPT | Performed by: NURSE PRACTITIONER

## 2022-02-09 PROCEDURE — 99214 OFFICE O/P EST MOD 30 MIN: CPT | Performed by: NURSE PRACTITIONER

## 2022-02-09 RX ORDER — PREGABALIN 50 MG/1
50 CAPSULE ORAL 2 TIMES DAILY
Qty: 180 CAPSULE | Refills: 0 | Status: SHIPPED | OUTPATIENT
Start: 2022-02-09 | End: 2022-05-04 | Stop reason: SDUPTHER

## 2022-02-09 NOTE — PATIENT INSTRUCTIONS
Vaccine Information Statement    Pneumococcal Polysaccharide Vaccine (PPSV23): What You Need to Know    Many Vaccine Information Statements are available in [place] and other languages. See www.immunize.org/vis  Hojas de información sobre vacunas están disponibles en español y en muchos otros idiomas. Visite www.immunize.org/vis    1. Why get vaccinated? Pneumococcal polysaccharide vaccine (PPSV23) can prevent pneumococcal disease. Pneumococcal disease refers to any illness caused by pneumococcal bacteria. These bacteria can cause many types of illnesses, including pneumonia, which is an infection of the lungs. Pneumococcal bacteria are one of the most common causes of pneumonia. Besides pneumonia, pneumococcal bacteria can also cause:   Ear infections   Sinus infections   Meningitis (infection of the tissue covering the brain and spinal cord)   Bacteremia (bloodstream infection)    Anyone can get pneumococcal disease, but children under 3years of age, people with certain medical conditions, adults 72 years or older, and cigarette smokers are at the highest risk. Most pneumococcal infections are mild. However, some can result in long-term problems, such as brain damage or hearing loss. Meningitis, bacteremia, and pneumonia caused by pneumococcal disease can be fatal.     2. PPSV23     PPSV23 protects against 23 types of bacteria that cause pneumococcal disease. PPSV23 is recommended for:   All adults 72 years or older,   Anyone 2 years or older with certain medical conditions that can lead to an increased risk for pneumococcal disease. Most people need only one dose of PPSV23. A second dose of PPSV23, and another type of pneumococcal vaccine called PCV13, are recommended for certain high-risk groups. Your health care provider can give you more information.     People 65 years or older should get a dose of PPSV23 even if they have already gotten one or more doses of the vaccine before they turned 72.    3. Talk with your health care provider    Tell your vaccine provider if the person getting the vaccine:   Has had an allergic reaction after a previous dose of PPSV23, or has any severe, life-threatening allergies. In some cases, your health care provider may decide to postpone PPSV23 vaccination to a future visit. People with minor illnesses, such as a cold, may be vaccinated. People who are moderately or severely ill should usually wait until they recover before getting PPSV23. Your health care provider can give you more information. 4. Risks of a vaccine reaction     Redness or pain where the shot is given, feeling tired, fever, or muscle aches can happen after PPSV23. People sometimes faint after medical procedures, including vaccination. Tell your provider if you feel dizzy or have vision changes or ringing in the ears. As with any medicine, there is a very remote chance of a vaccine causing a severe allergic reaction, other serious injury, or death. 5. What if there is a serious problem? An allergic reaction could occur after the vaccinated person leaves the clinic. If you see signs of a severe allergic reaction (hives, swelling of the face and throat, difficulty breathing, a fast heartbeat, dizziness, or weakness), call 9-1-1 and get the person to the nearest hospital.    For other signs that concern you, call your health care provider. Adverse reactions should be reported to the Vaccine Adverse Event Reporting System (VAERS). Your health care provider will usually file this report, or you can do it yourself. Visit the VAERS website at www.vaers. hhs.gov or call 4-107.809.6478. VAERS is only for reporting reactions, and VAERS staff do not give medical advice. 6. How can I learn more?  Ask your health care provider.  Call your local or state health department.    Contact the Centers for Disease Control and Prevention (CDC):  - Call 0-245.889.4061 (5-552-HXY-INFO) or  - Visit CDCs website at www.cdc.gov/vaccines    Vaccine Information Statement   PPSV23   10/30/2019    Department of Health and [facility] for Disease Control and Prevention    Office Use Only

## 2022-02-09 NOTE — PROGRESS NOTES
Subjective: (As above and below)     Chief Complaint   Patient presents with    Follow-up     DM    Foot Pain     pain in both feet, pt states pain makes it hard for her to walk      Olga Henson is a 64y.o. year old female who presents for     Hypertension ROS:  taking medications as instructed, no medication side effects noted, no TIAs, no chest pain on exertion, no dyspnea on exertion, no swelling of ankles    Diabetic Review of Systems -diet controlled, diabetic diet compliance: compliant most of the time, home glucose monitoring: is performed sporadically. Cardiomyopathy; follows w/ cardio          Reviewed PmHx, RxHx, FmHx, SocHx, AllgHx and updated in chart. Family History   Problem Relation Age of Onset    Diabetes Mother     Diabetes Father     Diabetes Son        Past Medical History:   Diagnosis Date    Cardiomyopathy [facility]) 12/3/2020    HFrEF (heart failure with reduced ejection fraction) ([facility] [address] 12/3/2020    Hypertension, uncontrolled 12/3/2020    Mobitz type 2 second degree atrioventricular block 49/6/3460    Systolic heart failure ([facility] [address] 12/3/2020      Social History     Socioeconomic History    Marital status: SINGLE    Number of children: 2   Tobacco Use    Smoking status: Never Smoker    Smokeless tobacco: Never Used   Vaping Use    Vaping Use: Never used   Substance and Sexual Activity    Alcohol use: Never    Drug use: Never    Sexual activity: Not Currently     Partners: Male          Current Outpatient Medications   Medication Sig    pregabalin (LYRICA) 50 mg capsule Take 1 Capsule by mouth two (2) times a day. Max Daily Amount: 100 mg.  aspirin delayed-release 81 mg tablet TAKE 1 TABLET BY MOUTH ONCE DAILY.  hydrALAZINE (APRESOLINE) 50 mg tablet Take 1 Tablet by mouth three (3) times daily.  cloNIDine HCL (CATAPRES) 0.2 mg tablet TAKE ONE TABLET BY MOUTH THREE TIMES DAILY    amLODIPine (NORVASC) 10 mg tablet Take 1 Tablet by mouth daily.     lisinopriL (PRINIVIL, ZESTRIL) 2.5 mg tablet Take 1 Tablet by mouth daily.  acetaminophen (TYLENOL) 650 mg TbER Take 1 Tablet by mouth three (3) times daily as needed for Pain.  calcium carbonate-vitamin D3 (Calcium 600 with Vitamin D3) 600 mg(1,500mg) -500 unit cap Take 1 Each by mouth daily.  polyethylene glycol (Miralax) 17 gram packet Take 1 Packet by mouth daily. constipation    flash glucose scanning reader (FreeStyle Clary 14 Day [place]) misc 1 Each by Does Not Apply route Once every 2 weeks.  flash glucose sensor (FreeStyle Clary 14 Day Sensor) kit 1 Each by Does Not Apply route Once every 2 weeks.  glucose blood VI test strips (ASCENSIA AUTODISC VI, ONE TOUCH ULTRA TEST VI) strip Check sugar once daily fasting.  Blood-Glucose Meter monitoring kit Check sugar once daily fasting. E11.9    lancets misc Use as directed. Dx: E11.9 Check sugar once daily fasting    alcohol swabs padm 1 Each by Apply Externally route daily. For checking sugar    ammonium lactate (LAC-HYDRIN) 12 % lotion Apply 1 Each to affected area as needed (dry skin). rub in to affected area well     No current facility-administered medications for this visit. Review of Systems:   Constitutional:    Negative for fever and chills, negative diaphoresis. HEENT:              Negative for neck pain and stiffness. Eyes:                  Negative for visual disturbance, itching, redness or discharge. Respiratory:        Negative for cough and shortness of breath. Cardiovascular:  Negative for chest pain and palpitations. Gastrointestinal: Negative for nausea, vomiting, abdominal pain, diarrhea or constipation. Genitourinary:     Negative for dysuria and frequency. Musculoskeletal: Negative for falls, tenderness and swelling. Skin:                    Negative for rash, masses or lesions. Neurological:       Negative for dizzyness, seizure, loss of consciousness, weakness and numbness.      Objective:     Vitals: 02/09/22 1032 02/09/22 1119   BP: (!) 146/69 136/66   Pulse: 91 84   Resp: 17    Temp: 98.2 °F (36.8 °C)    TempSrc: Temporal    SpO2: 99%    Weight: 164 lb (74.4 kg)    Height: 5' 6\" (1.676 m)        Diabetic foot exam:     Left Foot:   Visual Exam: normal    Pulse DP: 2+ (normal)   Filament test: diminished to toes   Vibratory sensation: normal      Right Foot:   Visual Exam: normal    Pulse DP: 2+ (normal)   Filament test: diminished to toes   Vibratory sensation: normal      Gen: Oriented to person, place and time and well-developed, well-nourished and in no distress. HEENT:    Head: normocephalic and atraumatic. Eyes:  EOM are normal. Pupils equal and round. Neck:  Normal range of motion. Neck supple. Cardiovascular: normal rate, regular rhythm and normal heart sounds. Pulmonary/Chest:  Effort normal and breath sounds normal.  No respiratory distress. No wheezes, no rales. Abdominal: soft, normal  bowel sounds. Musculoskeletal:  No edema, no tenderness. No calf tenderness or edema. Neurological:  Alert, oriented to person, place and time. Skin: skin is warm and dry. Assessment/ Plan:     Follow-up and Dispositions    · Return in about 6 months (around 8/9/2022). 1. Neuropathy    - pregabalin (LYRICA) 50 mg capsule; Take 1 Capsule by mouth two (2) times a day. Max Daily Amount: 100 mg. Dispense: 180 Capsule; Refill: 0  - METABOLIC PANEL, BASIC; Future    2. HFrEF (heart failure with reduced ejection fraction) ([facility])    - METABOLIC PANEL, BASIC; Future    3. Type 2 diabetes mellitus with stage 2 chronic kidney disease, without long-term current use of insulin ([facility])    - AMB POC HEMOGLOBIN A1C  - AMB POC GLUCOSE BLOOD, BY GLUCOSE MONITORING DEVICE  - AMB POC URINE, MICROALBUMIN, SEMIQUANT (3 RESULTS)  -  DIABETES FOOT EXAM  - METABOLIC PANEL, BASIC; Future    4. Anemia, unspecified type    - FERRITIN; Future  - CBC WITH AUTOMATED DIFF; Future    5.  Pain in both feet    - REFERRAL TO PODIATRY    6. Encounter for immunization    - PNEUMOCOCCAL POLYSACCHARIDE VACCINE, 23-VALENT, ADULT OR IMMUNOSUPPRESSED PT DOSE,        I have discussed the diagnosis with the patient and the intended plan as seen in the above orders. The patient has received an after-visit summary and questions were answered concerning future plans. Pt conveyed understanding of plan. Medication Side Effects and Warnings were discussed with patient: yes  Patient Labs were reviewed: yes  Patient Past Records were reviewed:  yes    Darcie Dudley.  Jordyn Wright NP

## 2022-02-09 NOTE — PROGRESS NOTES
Chief Complaint   Patient presents with    Follow-up     DM    Foot Pain     pain in both feet, pt states pain makes it hard for her to walk        1. Have you been to the ER, urgent care clinic since your last visit? Hospitalized since your last visit? No    2. Have you seen or consulted any other health care providers outside of the [address] [place] [address] since your last visit? Include any pap smears or colon screening.  No

## 2022-02-11 DIAGNOSIS — I10 PRIMARY HYPERTENSION: Primary | ICD-10-CM

## 2022-03-07 ENCOUNTER — OFFICE VISIT (OUTPATIENT)
Dept: INTERNAL MEDICINE CLINIC | Age: 77
End: 2022-03-07
Payer: MEDICARE

## 2022-03-07 VITALS
HEIGHT: 66 IN | DIASTOLIC BLOOD PRESSURE: 67 MMHG | RESPIRATION RATE: 18 BRPM | TEMPERATURE: 97.9 F | BODY MASS INDEX: 27 KG/M2 | OXYGEN SATURATION: 98 % | HEART RATE: 86 BPM | WEIGHT: 168 LBS | SYSTOLIC BLOOD PRESSURE: 138 MMHG

## 2022-03-07 DIAGNOSIS — E11.9 CONTROLLED TYPE 2 DIABETES MELLITUS WITHOUT COMPLICATION, WITHOUT LONG-TERM CURRENT USE OF INSULIN (HCC): ICD-10-CM

## 2022-03-07 DIAGNOSIS — I10 ESSENTIAL HYPERTENSION: ICD-10-CM

## 2022-03-07 DIAGNOSIS — N32.81 OAB (OVERACTIVE BLADDER): Primary | ICD-10-CM

## 2022-03-07 LAB
GLUCOSE POC: 135 MG/DL
HBA1C MFR BLD HPLC: 6.2 %

## 2022-03-07 PROCEDURE — G8399 PT W/DXA RESULTS DOCUMENT: HCPCS | Performed by: NURSE PRACTITIONER

## 2022-03-07 PROCEDURE — G8419 CALC BMI OUT NRM PARAM NOF/U: HCPCS | Performed by: NURSE PRACTITIONER

## 2022-03-07 PROCEDURE — 82962 GLUCOSE BLOOD TEST: CPT | Performed by: NURSE PRACTITIONER

## 2022-03-07 PROCEDURE — G8432 DEP SCR NOT DOC, RNG: HCPCS | Performed by: NURSE PRACTITIONER

## 2022-03-07 PROCEDURE — 1090F PRES/ABSN URINE INCON ASSESS: CPT | Performed by: NURSE PRACTITIONER

## 2022-03-07 PROCEDURE — G8427 DOCREV CUR MEDS BY ELIG CLIN: HCPCS | Performed by: NURSE PRACTITIONER

## 2022-03-07 PROCEDURE — 99213 OFFICE O/P EST LOW 20 MIN: CPT | Performed by: NURSE PRACTITIONER

## 2022-03-07 PROCEDURE — G8752 SYS BP LESS 140: HCPCS | Performed by: NURSE PRACTITIONER

## 2022-03-07 PROCEDURE — G8536 NO DOC ELDER MAL SCRN: HCPCS | Performed by: NURSE PRACTITIONER

## 2022-03-07 PROCEDURE — 1101F PT FALLS ASSESS-DOCD LE1/YR: CPT | Performed by: NURSE PRACTITIONER

## 2022-03-07 PROCEDURE — G8754 DIAS BP LESS 90: HCPCS | Performed by: NURSE PRACTITIONER

## 2022-03-07 PROCEDURE — 83036 HEMOGLOBIN GLYCOSYLATED A1C: CPT | Performed by: NURSE PRACTITIONER

## 2022-03-07 NOTE — PROGRESS NOTES
Chief Complaint   Patient presents with    Overactive Bladder     x 2 months, uncontrollable urinating     Follow-up     pt's daughter requesting cognitive screening       1. \"Have you been to the ER, urgent care clinic since your last visit? Hospitalized since your last visit? \" No    2. \"Have you seen or consulted any other health care providers outside of the [address] [place] [address] since your last visit? \" No     3. For patients aged 39-70: Has the patient had a colonoscopy / FIT/ Cologuard? NA - based on age      If the patient is female:    4. For patients aged 41-77: Has the patient had a mammogram within the past 2 years? NA - based on age or sex      11. For patients aged 21-65: Has the patient had a pap smear?  NA - based on age or sex

## 2022-03-07 NOTE — PROGRESS NOTES
Subjective: (As above and below)     Chief Complaint   Patient presents with    Overactive Bladder     x 2 months, uncontrollable urinating      Mario Mendiola is a 64y.o. year old female who presents for     Hypertension ROS:  taking medications as instructed, no medication side effects noted, no TIAs, no chest pain on exertion, no dyspnea on exertion, no swelling of ankles    BP Readings from Last 3 Encounters:   03/07/22 (!) 160/72   02/09/22 136/66   11/08/21 126/65       Diabetic Review of Systems - diet controlled    Urinary problem: urinary frequency and incontinence x 2 mo  No dysuria, pelvic or flank pain  No constipation          Reviewed PmHx, RxHx, FmHx, SocHx, AllgHx and updated in chart. Family History   Problem Relation Age of Onset    Diabetes Mother     Diabetes Father     Diabetes Son        Past Medical History:   Diagnosis Date    Cardiomyopathy [facility]) 12/3/2020    HFrEF (heart failure with reduced ejection fraction) ([facility] [address] 12/3/2020    Hypertension, uncontrolled 12/3/2020    Mobitz type 2 second degree atrioventricular block 09/3/8436    Systolic heart failure ([facility] [address] 12/3/2020      Social History     Socioeconomic History    Marital status: SINGLE    Number of children: 2   Tobacco Use    Smoking status: Never Smoker    Smokeless tobacco: Never Used   Vaping Use    Vaping Use: Never used   Substance and Sexual Activity    Alcohol use: Never    Drug use: Never    Sexual activity: Not Currently     Partners: Male          Current Outpatient Medications   Medication Sig    pregabalin (LYRICA) 50 mg capsule Take 1 Capsule by mouth two (2) times a day. Max Daily Amount: 100 mg.  aspirin delayed-release 81 mg tablet TAKE 1 TABLET BY MOUTH ONCE DAILY.  hydrALAZINE (APRESOLINE) 50 mg tablet Take 1 Tablet by mouth three (3) times daily.     cloNIDine HCL (CATAPRES) 0.2 mg tablet TAKE ONE TABLET BY MOUTH THREE TIMES DAILY    amLODIPine (NORVASC) 10 mg tablet Take 1 Tablet by mouth daily.    lisinopriL (PRINIVIL, ZESTRIL) 2.5 mg tablet Take 1 Tablet by mouth daily.  acetaminophen (TYLENOL) 650 mg TbER Take 1 Tablet by mouth three (3) times daily as needed for Pain.  calcium carbonate-vitamin D3 (Calcium 600 with Vitamin D3) 600 mg(1,500mg) -500 unit cap Take 1 Each by mouth daily.  polyethylene glycol (Miralax) 17 gram packet Take 1 Packet by mouth daily. constipation    flash glucose scanning reader (FreeStyle [place] 14 Day [place]) misc 1 Each by Does Not Apply route Once every 2 weeks.  flash glucose sensor (FreeStyle [place]ary 14 Day Sensor) kit 1 Each by Does Not Apply route Once every 2 weeks.  glucose blood VI test strips (ASCENSIA AUTODISC VI, ONE TOUCH ULTRA TEST VI) strip Check sugar once daily fasting.  Blood-Glucose Meter monitoring kit Check sugar once daily fasting. E11.9    lancets misc Use as directed. Dx: E11.9 Check sugar once daily fasting    alcohol swabs padm 1 Each by Apply Externally route daily. For checking sugar    ammonium lactate (LAC-HYDRIN) 12 % lotion Apply 1 Each to affected area as needed (dry skin). rub in to affected area well     No current facility-administered medications for this visit. Review of Systems:   Constitutional:    Negative for fever and chills, negative diaphoresis. HEENT:              Negative for neck pain and stiffness. Eyes:                  Negative for visual disturbance, itching, redness or discharge. Respiratory:        Negative for cough and shortness of breath. Cardiovascular:  Negative for chest pain and palpitations. Gastrointestinal: Negative for nausea, vomiting, abdominal pain, diarrhea or constipation. Genitourinary:     Negative for dysuria + frequency. Musculoskeletal: Negative for falls, tenderness and swelling. Skin:                    Negative for rash, masses or lesions. Neurological:       Negative for dizzyness, seizure, loss of consciousness, weakness and numbness.      Objective: Vitals:    03/07/22 1132   BP: (!) 160/72   Pulse: 82   Resp: 18   Temp: 97.9 °F (36.6 °C)   TempSrc: Temporal   SpO2: 98%   Weight: 168 lb (76.2 kg)   Height: 5' 6\" (1.676 m)     Results for orders placed or performed in visit on 03/07/22   AMB POC HEMOGLOBIN A1C   Result Value Ref Range    Hemoglobin A1c (POC) 6.2 %   AMB POC GLUCOSE BLOOD, BY GLUCOSE MONITORING DEVICE   Result Value Ref Range    Glucose  MG/DL           Gen: Oriented to person, place and time and well-developed, well-nourished and in no distress. HEENT:    Head: normocephalic and atraumatic. Eyes:  EOM are normal. Pupils equal and round. Neck:  Normal range of motion. Neck supple. Cardiovascular: normal rate, regular rhythm and normal heart sounds. Pulmonary/Chest:  Effort normal and breath sounds normal.  No respiratory distress. No wheezes, no rales. Abdominal: soft, normal  bowel sounds. Musculoskeletal:  No edema, no tenderness. No calf tenderness or edema. Neurological:  Alert, oriented to person, place and time. Skin: skin is warm and dry. Assessment/ Plan:       1. OAB (overactive bladder)  Unable to give urine sample in office, will add to labs and they will do tomorrow  - URINALYSIS W/ REFLEX CULTURE; Future      2. Controlled type 2 diabetes mellitus without complication, without long-term current use of insulin ([facility])    - AMB POC HEMOGLOBIN A1C  - AMB POC GLUCOSE BLOOD, BY GLUCOSE MONITORING DEVICE    3. Essential hypertension    - METABOLIC PANEL, BASIC; Future          I have discussed the diagnosis with the patient and the intended plan as seen in the above orders. The patient has received an after-visit summary and questions were answered concerning future plans. Pt conveyed understanding of plan. Medication Side Effects and Warnings were discussed with patient: yes  Patient Labs were reviewed: yes  Patient Past Records were reviewed:  yes    Maranda Lowery.  Dayne Sanders NP

## 2022-03-18 PROBLEM — I50.20 SYSTOLIC HEART FAILURE (HCC): Status: ACTIVE | Noted: 2020-12-03

## 2022-03-19 PROBLEM — I50.20 HFREF (HEART FAILURE WITH REDUCED EJECTION FRACTION) (HCC): Status: ACTIVE | Noted: 2020-12-03

## 2022-03-19 PROBLEM — R32 URINARY INCONTINENCE: Status: ACTIVE | Noted: 2021-02-04

## 2022-03-19 PROBLEM — M85.89 OSTEOPENIA OF MULTIPLE SITES: Status: ACTIVE | Noted: 2021-05-11

## 2022-03-19 PROBLEM — E11.22 TYPE 2 DIABETES MELLITUS WITH CHRONIC KIDNEY DISEASE (HCC): Status: ACTIVE | Noted: 2021-08-10

## 2022-03-19 PROBLEM — I44.1 MOBITZ TYPE 2 SECOND DEGREE ATRIOVENTRICULAR BLOCK: Status: ACTIVE | Noted: 2020-12-03

## 2022-03-20 PROBLEM — I10 HYPERTENSION, UNCONTROLLED: Status: ACTIVE | Noted: 2020-12-03

## 2022-03-20 PROBLEM — I42.9 CARDIOMYOPATHY (HCC): Status: ACTIVE | Noted: 2020-12-03

## 2022-05-04 DIAGNOSIS — G62.9 NEUROPATHY: ICD-10-CM

## 2022-05-04 RX ORDER — PREGABALIN 50 MG/1
50 CAPSULE ORAL 2 TIMES DAILY
Qty: 180 CAPSULE | Refills: 0 | Status: SHIPPED | OUTPATIENT
Start: 2022-05-04

## 2022-05-04 NOTE — TELEPHONE ENCOUNTER
Pt left a voice message requesting a 90 day supply refill for the Lyrica 50 mg. [phone]      Last visit 03/07/2022 NP Karthikeyan Berry   Next appointment 08/10/2022 NP Karthikeyan Berry   Previous refill encounter(s)   02/09/2022 Lyrica #180     No access to PDMP     Requested Prescriptions     Pending Prescriptions Disp Refills    pregabalin (LYRICA) 50 mg capsule 180 Capsule 0     Sig: Take 1 Capsule by mouth two (2) times a day. Max Daily Amount: 100 mg.

## 2022-06-15 ENCOUNTER — NURSE TRIAGE (OUTPATIENT)
Dept: OTHER | Facility: CLINIC | Age: 77
End: 2022-06-15

## 2022-06-15 NOTE — TELEPHONE ENCOUNTER
Received call from Aamir Rosario at [facility] with Red Flag Complaint. Subjective: Caller and niece(Shy) states leg swelling worse past 4-5 days. Swelling ususally just in feet ,now swelling goes up to thighs. Home Health Nurse saw pt. Yesterday and advised pt. Be be seen ASAP. Current Symptoms: Bilat leg swelling feet to thighs,equal amount of swelling both legs. Denies pain, redness or SOB    Onset: 4-5 days worsening    Associated Symptoms: NA    Pain Severity: Denies at present     Temperature: Denies    What has been tried:     LMP: NA Pregnant: No    Recommended disposition: Go to ED/UCC Now (Or to Office with PCP Approval)    Care advice provided, patient verbalizes understanding; denies any other questions or concerns; instructed to call back for any new or worsening symptoms. Writer provided warm transfer to Makenzie Shi at [address] for 2nd level triage    Attention Provider: Thank you for allowing me to participate in the care of your patient. The patient was connected to triage in response to information provided to the [facility]. Please do not respond through this encounter as the response is not directed to a shared pool.       Reason for Disposition   SEVERE swelling (e.g., swelling extends above knee, entire leg is swollen, weeping fluid)    Protocols used: LEG SWELLING AND EDEMA-ADULT-OH

## 2022-06-19 RX ORDER — BLOOD SUGAR DIAGNOSTIC
STRIP MISCELLANEOUS
Qty: 100 STRIP | Refills: 11 | Status: SHIPPED | OUTPATIENT
Start: 2022-06-19

## 2022-09-06 NOTE — TELEPHONE ENCOUNTER
----- Message from Riddhi Munguia. Maxine Eldridge NP sent at 5/7/2021  9:56 AM EDT -----  I tried calling her but her phone will not accept calls from blocked #  Can you call and let her know that her sugars have been running high to the level of now being diabetic and her kidney numbers are a bit worse  She needs to not take any nsaids, please talk to her about diet- reducing sweets, carbs and I need to see her sooner than July   I also want her to start checking her sugar so I will send a meter but if she needs teaching she can come in for NV  ----- Message -----  From: Vaibhav Hernandez In [employer]  Sent: 5/5/2021   5:02 PM EDT  To: Riddhi Munguia.  Maxine Eldridge NP Dutasteride Counseling: Dustasteride Counseling:  I discussed with the patient the risks of use of dutasteride including but not limited to decreased libido, decreased ejaculate volume, and gynecomastia. Women who can become pregnant should not handle medication.  All of the patient's questions and concerns were addressed. Dutasteride Male Counseling: Dustasteride Counseling:  I discussed with the patient the risks of use of dutasteride including but not limited to decreased libido, decreased ejaculate volume, and gynecomastia. Women who can become pregnant should not handle medication.  All of the patient's questions and concerns were addressed.

## 2022-09-22 ENCOUNTER — OFFICE VISIT (OUTPATIENT)
Dept: INTERNAL MEDICINE CLINIC | Age: 77
End: 2022-09-22
Payer: MEDICARE

## 2022-09-22 ENCOUNTER — TELEPHONE (OUTPATIENT)
Dept: CARDIOLOGY CLINIC | Age: 77
End: 2022-09-22

## 2022-09-22 VITALS
HEART RATE: 62 BPM | OXYGEN SATURATION: 98 % | DIASTOLIC BLOOD PRESSURE: 76 MMHG | SYSTOLIC BLOOD PRESSURE: 128 MMHG | RESPIRATION RATE: 16 BRPM | HEIGHT: 66 IN | BODY MASS INDEX: 27.12 KG/M2 | TEMPERATURE: 98.5 F

## 2022-09-22 DIAGNOSIS — I42.9 CARDIOMYOPATHY, UNSPECIFIED TYPE (HCC): ICD-10-CM

## 2022-09-22 DIAGNOSIS — N32.81 OAB (OVERACTIVE BLADDER): ICD-10-CM

## 2022-09-22 DIAGNOSIS — I50.9 ACUTE ON CHRONIC CONGESTIVE HEART FAILURE, UNSPECIFIED HEART FAILURE TYPE (HCC): ICD-10-CM

## 2022-09-22 DIAGNOSIS — E11.22 TYPE 2 DIABETES MELLITUS WITH STAGE 3B CHRONIC KIDNEY DISEASE, WITHOUT LONG-TERM CURRENT USE OF INSULIN (HCC): ICD-10-CM

## 2022-09-22 DIAGNOSIS — N18.32 TYPE 2 DIABETES MELLITUS WITH STAGE 3B CHRONIC KIDNEY DISEASE, WITHOUT LONG-TERM CURRENT USE OF INSULIN (HCC): ICD-10-CM

## 2022-09-22 DIAGNOSIS — I10 ESSENTIAL HYPERTENSION: ICD-10-CM

## 2022-09-22 DIAGNOSIS — Z76.89 ENCOUNTER FOR SUPPORT AND COORDINATION OF TRANSITION OF CARE: Primary | ICD-10-CM

## 2022-09-22 DIAGNOSIS — S31.000A WOUND OF SACRAL REGION, INITIAL ENCOUNTER: ICD-10-CM

## 2022-09-22 DIAGNOSIS — R53.1 WEAKNESS GENERALIZED: ICD-10-CM

## 2022-09-22 DIAGNOSIS — Z00.00 MEDICARE ANNUAL WELLNESS VISIT, SUBSEQUENT: ICD-10-CM

## 2022-09-22 DIAGNOSIS — N18.31 STAGE 3A CHRONIC KIDNEY DISEASE (HCC): ICD-10-CM

## 2022-09-22 DIAGNOSIS — D89.2 PARAPROTEINEMIA: ICD-10-CM

## 2022-09-22 DIAGNOSIS — E78.2 MIXED HYPERLIPIDEMIA: ICD-10-CM

## 2022-09-22 PROBLEM — N18.30 CHRONIC RENAL DISEASE, STAGE III (HCC): Status: ACTIVE | Noted: 2022-09-22

## 2022-09-22 PROCEDURE — G8417 CALC BMI ABV UP PARAM F/U: HCPCS | Performed by: NURSE PRACTITIONER

## 2022-09-22 PROCEDURE — 1090F PRES/ABSN URINE INCON ASSESS: CPT | Performed by: NURSE PRACTITIONER

## 2022-09-22 PROCEDURE — G8536 NO DOC ELDER MAL SCRN: HCPCS | Performed by: NURSE PRACTITIONER

## 2022-09-22 PROCEDURE — G8427 DOCREV CUR MEDS BY ELIG CLIN: HCPCS | Performed by: NURSE PRACTITIONER

## 2022-09-22 PROCEDURE — 1101F PT FALLS ASSESS-DOCD LE1/YR: CPT | Performed by: NURSE PRACTITIONER

## 2022-09-22 PROCEDURE — G8432 DEP SCR NOT DOC, RNG: HCPCS | Performed by: NURSE PRACTITIONER

## 2022-09-22 PROCEDURE — G0439 PPPS, SUBSEQ VISIT: HCPCS | Performed by: NURSE PRACTITIONER

## 2022-09-22 PROCEDURE — G8754 DIAS BP LESS 90: HCPCS | Performed by: NURSE PRACTITIONER

## 2022-09-22 PROCEDURE — 99214 OFFICE O/P EST MOD 30 MIN: CPT | Performed by: NURSE PRACTITIONER

## 2022-09-22 PROCEDURE — G8399 PT W/DXA RESULTS DOCUMENT: HCPCS | Performed by: NURSE PRACTITIONER

## 2022-09-22 PROCEDURE — G8752 SYS BP LESS 140: HCPCS | Performed by: NURSE PRACTITIONER

## 2022-09-22 RX ORDER — METOPROLOL TARTRATE 25 MG/1
25 TABLET, FILM COATED ORAL EVERY 12 HOURS
COMMUNITY
Start: 2022-09-11

## 2022-09-22 NOTE — PATIENT INSTRUCTIONS
Change briefs often to prevent wound from being too wet, try to have it open to air and change positions often (every two hours)    Try a barrier cream like desitin, calmoseptine     Compression stockings

## 2022-09-22 NOTE — PROGRESS NOTES
Room: 6    Identified pt with two pt identifiers(name and ). Reviewed record in preparation for visit and have obtained necessary documentation. All patient medications has been reviewed. Chief Complaint   Patient presents with    Hospital Follow Up     Seen at [address] [place] [address] for CHF       Health Maintenance Due   Topic    Hepatitis C Screening     Eye Exam Retinal or Dilated     DTaP/Tdap/Td series (1 - Tdap)    Shingrix Vaccine Age 50> (1 of 2)    COVID-19 Vaccine (4 - Booster for [employer] series)    Medicare Yearly Exam     Flu Vaccine (1)       Vitals:    22 1113   BP: 128/76   Pulse: 62   Resp: 16   Temp: 98.5 °F (36.9 °C)   TempSrc: Temporal   SpO2: 98%   Height: 5' 6\" (1.676 m)   PainSc:   0 - No pain       4. Have you been to the ER, urgent care clinic since your last visit? Hospitalized since your last visit? No    5. Have you seen or consulted any other health care providers outside of the [address] [place] [address] since your last visit? Include any pap smears or colon screening. No    Patient is accompanied by patient, son, and  I have received verbal consent from Howie Magaña to discuss any/all medical information while they are present in the room.

## 2022-09-22 NOTE — PROGRESS NOTES
Subjective: (As above and below)     Chief Complaint   Patient presents with    Hospital Follow Up     Seen at [facility] for CHF     Dayana Gonzalez is a 76y.o. year old female who presents for     DIAS MARYAM here w/ son who is her caregiver recently  Admitted from 07/29/22 to 8/2/22 at [facility] for:    Acute on chronic CHF exacerbation   Acute hypoxic resp failure w/ pleural & pericardial effusion  intubated 8/4, extubated 8/10  Thoracentesis 8/2 & 8/3  Cardiac tamponade w/ candida w/ drain removal 8/7- completed fluconozole  ARF: requiring HD, but able to stop once renal fxn improved  A-fib rate controlled, on lopressor  Acute metabolic encephlopathy: CT showed chronic ischemic changes, at baseline now- some memory deficits  Dysphagia: dobhoff dc'd, pt eating/drinking normally  Possible pancreatitis: protein calorie malnutrition- on regular diet,   Paraproteinemia: heme onc following- was to cl w/ Dr. Nikos Frederick outpt      Hypertension ROS:  taking medications as instructed, no medication side effects noted, no TIAs, no chest pain on exertion, no dyspnea on exertion, no swelling of ankles    Diabetic Review of Systems - diet controlled. Incontinence of bowel and bladder most times, has adult briefs    Sacral wound: no mention in hospital notes found. Has HH and wound care    No fevers  Pt reports feeling fair  Diet normal  No falls, primarily wheelchair bound now due to weakness    Family prefers referral to cardio here for ease of transportation  Outpt fu w/ heme-onc: unknown    [facility] PT, [facility]    Reviewed PmHx, RxHx, FmHx, SocHx, AllgHx and updated in chart.   Family History   Problem Relation Age of Onset    Diabetes Mother     Diabetes Father     Diabetes Son        Past Medical History:   Diagnosis Date    Cardiomyopathy ([address] 12/3/2020    HFrEF (heart failure with reduced ejection fraction) ([address] 12/3/2020    Hypertension, uncontrolled 12/3/2020    Mobitz type 2 second degree atrioventricular block 46/1/8697    Systolic heart failure ([facility] 12/3/2020      Social History     Socioeconomic History    Marital status: SINGLE    Number of children: 2   Tobacco Use    Smoking status: Never    Smokeless tobacco: Never   Vaping Use    Vaping Use: Never used   Substance and Sexual Activity    Alcohol use: Never    Drug use: Never    Sexual activity: Not Currently     Partners: Male          Current Outpatient Medications   Medication Sig    glucose blood VI test strips (OneTouch Verio test strips) strip USE TO CHECK BLOOD SUGAR ONCE DAILY FASTING    pregabalin (LYRICA) 50 mg capsule Take 1 Capsule by mouth two (2) times a day. Max Daily Amount: 100 mg.    cloNIDine HCL (CATAPRES) 0.2 mg tablet TAKE ONE TABLET BY MOUTH THREE TIMES DAILY    amLODIPine (NORVASC) 10 mg tablet Take 1 Tablet by mouth daily. lisinopriL (PRINIVIL, ZESTRIL) 2.5 mg tablet Take 1 Tablet by mouth daily. acetaminophen (TYLENOL) 650 mg TbER Take 1 Tablet by mouth three (3) times daily as needed for Pain. flash glucose scanning reader (FreeStyle Clary 14 Day [place]) misc 1 Each by Does Not Apply route Once every 2 weeks. flash glucose sensor (FreeStyle Clary 14 Day Sensor) kit 1 Each by Does Not Apply route Once every 2 weeks. Blood-Glucose Meter monitoring kit Check sugar once daily fasting. E11.9    lancets misc Use as directed. Dx: E11.9 Check sugar once daily fasting    alcohol swabs padm 1 Each by Apply Externally route daily. For checking sugar    polyethylene glycol (Miralax) 17 gram packet Take 1 Packet by mouth daily. constipation    ammonium lactate (LAC-HYDRIN) 12 % lotion Apply 1 Each to affected area as needed (dry skin). rub in to affected area well    metoprolol tartrate (LOPRESSOR) 25 mg tablet Take 25 mg by mouth every twelve (12) hours. aspirin delayed-release 81 mg tablet TAKE 1 TABLET BY MOUTH ONCE DAILY.  (Patient not taking: Reported on 9/22/2022)    calcium carbonate-vitamin D3 (Calcium 600 with Vitamin D3) 600 mg(1,500mg) -500 unit cap Take 1 Each by mouth daily. (Patient not taking: Reported on 9/22/2022)     No current facility-administered medications for this visit. Review of Systems:   Constitutional:    Negative for fever and chills, negative diaphoresis. HEENT:              Negative for neck pain and stiffness. Eyes:                  Negative for visual disturbance, itching, redness or discharge. Respiratory:        Negative for cough and shortness of breath. Cardiovascular:  Negative for chest pain and palpitations. Trace pedal edema  Gastrointestinal: Negative for nausea, vomiting, abdominal pain, diarrhea or constipation. Genitourinary:     Negative for dysuria and frequency. Musculoskeletal: Negative for falls, tenderness and swelling. Skin:                    sacral wound: large measure approx  It is unstageable, wound bed covered in slough, no drainage but scant odor noted  No surrounding cellulitis  Neurological:       Negative for dizzyness, seizure, loss of consciousness, weakness and numbness. Objective:     Vitals:    09/22/22 1113   BP: 128/76   Pulse: 62   Resp: 16   Temp: 98.5 °F (36.9 °C)   TempSrc: Temporal   SpO2: 98%   Height: 5' 6\" (1.676 m)       Results for orders placed or performed in visit on 03/07/22   AMB POC HEMOGLOBIN A1C   Result Value Ref Range    Hemoglobin A1c (POC) 6.2 %   AMB POC GLUCOSE BLOOD, BY GLUCOSE MONITORING DEVICE   Result Value Ref Range    Glucose  MG/DL             Assessment/ Plan:     Follow-up and Dispositions    Return in about 3 weeks (around 10/13/2022) for virtual visit. 1. Medicare annual wellness visit, subsequent      2. Encounter for support and coordination of transition of care      3. Type 2 diabetes mellitus with stage 3b chronic kidney disease, without long-term current use of insulin ([facility])    - AMB POC HEMOGLOBIN A1C    4. Stage 3a chronic kidney disease ([address]      5. Essential hypertension      6.  Mixed hyperlipidemia      7. OAB (overactive bladder)    - AMB SUPPLY ORDER  - AMB SUPPLY ORDER    8. Acute on chronic congestive heart failure, unspecified heart failure type ([address]    - REFERRAL TO CARDIOLOGY    9. Paraproteinemia  ?heme- reviewed after fam left will fu to see if they have fu    10. Wound of sacral region, initial encounter  Has HH   - AMB SUPPLY ORDER  - AMB SUPPLY ORDER    11. Weakness generalized    - AMB SUPPLY ORDER  - AMB SUPPLY ORDER    12. Cardiomyopathy, unspecified type ([facility]          I have discussed the diagnosis with the patient and the intended plan as seen in the above orders. The patient has received an after-visit summary and questions were answered concerning future plans. Pt conveyed understanding of plan. Medication Side Effects and Warnings were discussed with patient: yes  Patient Labs were reviewed: yes  Patient Past Records were reviewed:  yes    Colin Anedrson. Taran Gee NP    This is the Subsequent Medicare Annual Wellness Exam, performed 12 months or more after the Initial AWV or the last Subsequent AWV    I have reviewed the patient's medical history in detail and updated the computerized patient record. Assessment/Plan   Education and counseling provided:  Are appropriate based on today's review and evaluation    1. Encounter for support and coordination of transition of care  2. Medicare annual wellness visit, subsequent  3. Type 2 diabetes mellitus with stage 3b chronic kidney disease, without long-term current use of insulin (HCC)  -     AMB POC HEMOGLOBIN A1C  4. Stage 3a chronic kidney disease ([facility]  5. Essential hypertension  6. Mixed hyperlipidemia  7. OAB (overactive bladder)  -     AMB SUPPLY ORDER  -     AMB SUPPLY ORDER  8. Acute on chronic congestive heart failure, unspecified heart failure type (HCC)  -     REFERRAL TO CARDIOLOGY  9. Paraproteinemia  10. Wound of sacral region, initial encounter  -     AMB SUPPLY ORDER  -     AMB SUPPLY ORDER  11. Weakness generalized  -     AMB SUPPLY ORDER  -     AMB SUPPLY ORDER  12. Cardiomyopathy, unspecified type ([facility]       Depression Risk Factor Screening     3 most recent PHQ Screens 9/22/2022   Little interest or pleasure in doing things Not at all   Feeling down, depressed, irritable, or hopeless Not at all   Total Score PHQ 2 0   Trouble falling or staying asleep, or sleeping too much -   Feeling tired or having little energy -   Poor appetite, weight loss, or overeating -   Feeling bad about yourself - or that you are a failure or have let yourself or your family down -   Trouble concentrating on things such as school, work, reading, or watching TV -   Moving or speaking so slowly that other people could have noticed; or the opposite being so fidgety that others notice -   Thoughts of being better off dead, or hurting yourself in some way -   PHQ 9 Score -   How difficult have these problems made it for you to do your work, take care of your home and get along with others -       Alcohol & Drug Abuse Risk Screen    Do you average more than 1 drink per night or more than 7 drinks a week:  No    On any one occasion in the past three months have you have had more than 3 drinks containing alcohol:  No          Functional Ability and Level of Safety    Hearing: Hearing is good. Activities of Daily Living: The home contains: no safety equipment. Patient does total self care      Ambulation: with no difficulty     Fall Risk:  Fall Risk Assessment, last 12 mths 9/22/2022   Able to walk? Yes   Fall in past 12 months? 0   Do you feel unsteady? 1   Are you worried about falling 1   Is the gait abnormal? 1   Number of falls in past 12 months 0   Fall with injury?  -      Abuse Screen:  Patient is not abused       Cognitive Screening    Has your family/caregiver stated any concerns about your memory: no     Cognitive Screening: Abnormal - mild memory impairment, no concerning behaviors reported    Health Maintenance Due Health Maintenance Due   Topic Date Due    Hepatitis C Screening  Never done    Eye Exam Retinal or Dilated  Never done    DTaP/Tdap/Td series (1 - Tdap) Never done    Shingrix Vaccine Age 50> (1 of 2) Never done    COVID-19 Vaccine (4 - Booster for Pfizer series) 04/06/2022    Flu Vaccine (1) 08/01/2022       Patient Care Team   Patient Care Team:  Mónica Villatoro NP as PCP - General (Nurse Practitioner)  Mónica Villatoro NP as PCP - [facility] Provider    History     Patient Active Problem List   Diagnosis Code    Cardiomyopathy [facility]) I62.3    Systolic heart failure ([address] I50.20    HFrEF (heart failure with reduced ejection fraction) ([address] I50.20    Hypertension, uncontrolled I10    Mobitz type 2 second degree atrioventricular block I44.1    Urinary incontinence R32    Osteopenia of multiple sites M85.89    Type 2 diabetes mellitus with chronic kidney disease ([address] E11.22    Chronic renal disease, stage III N18.30     Past Medical History:   Diagnosis Date    Cardiomyopathy ([address] 12/3/2020    HFrEF (heart failure with reduced ejection fraction) ([address] 12/3/2020    Hypertension, uncontrolled 12/3/2020    Mobitz type 2 second degree atrioventricular block 53/4/3816    Systolic heart failure ([address] 12/3/2020      History reviewed. No pertinent surgical history. Current Outpatient Medications   Medication Sig Dispense Refill    glucose blood VI test strips (OneTouch Verio test strips) strip USE TO CHECK BLOOD SUGAR ONCE DAILY FASTING 100 Strip 11    pregabalin (LYRICA) 50 mg capsule Take 1 Capsule by mouth two (2) times a day. Max Daily Amount: 100 mg. 180 Capsule 0    cloNIDine HCL (CATAPRES) 0.2 mg tablet TAKE ONE TABLET BY MOUTH THREE TIMES DAILY 270 Tablet 3    amLODIPine (NORVASC) 10 mg tablet Take 1 Tablet by mouth daily. 90 Tablet 3    lisinopriL (PRINIVIL, ZESTRIL) 2.5 mg tablet Take 1 Tablet by mouth daily.  90 Tablet 3    acetaminophen (TYLENOL) 650 mg TbER Take 1 Tablet by mouth three (3) times daily as needed for Pain. 60 Tablet 1    flash glucose scanning reader (FreeStyle Clary 14 Day [place]) misc 1 Each by Does Not Apply route Once every 2 weeks. 1 Each 0    flash glucose sensor (FreeStyle Clary 14 Day Sensor) kit 1 Each by Does Not Apply route Once every 2 weeks. 6 Kit 11    Blood-Glucose Meter monitoring kit Check sugar once daily fasting. E11.9 1 Kit 0    lancets misc Use as directed. Dx: E11.9 Check sugar once daily fasting 1 Each 11    alcohol swabs padm 1 Each by Apply Externally route daily. For checking sugar 100 Pad 11    polyethylene glycol (Miralax) 17 gram packet Take 1 Packet by mouth daily. constipation 30 Packet 1    ammonium lactate (LAC-HYDRIN) 12 % lotion Apply 1 Each to affected area as needed (dry skin). rub in to affected area well 1 Bottle 1    metoprolol tartrate (LOPRESSOR) 25 mg tablet Take 25 mg by mouth every twelve (12) hours. aspirin delayed-release 81 mg tablet TAKE 1 TABLET BY MOUTH ONCE DAILY. (Patient not taking: Reported on 9/22/2022) 90 Tablet PRN    calcium carbonate-vitamin D3 (Calcium 600 with Vitamin D3) 600 mg(1,500mg) -500 unit cap Take 1 Each by mouth daily. (Patient not taking: Reported on 9/22/2022) 90 Cap 0     Allergies   Allergen Reactions    Seafood Hives    Drug Ingredient [Cetirizine] Unknown (comments)     Mistaken entry       Family History   Problem Relation Age of Onset    Diabetes Mother     Diabetes Father     Diabetes Son      Social History     Tobacco Use    Smoking status: Never    Smokeless tobacco: Never   Substance Use Topics    Alcohol use: Never         Delmi Fallen.  Allie Jean Baptiste NP

## 2022-09-22 NOTE — TELEPHONE ENCOUNTER
Cassie Mclaughlin I ask you to please call this patient to schedule an appointment with Dr. Minal Ashford at [facility]? I see she's been seen in the past at [facility]. Referral from [address] [place] [address].  Aman Chow NP   Referral to Roxanna Rhodes III, MD   Referral for congestive heart failure    Thank you,     Melisa Echavarria

## 2022-09-26 DIAGNOSIS — L89.300 PRESSURE INJURY OF BUTTOCK, UNSTAGEABLE, UNSPECIFIED LATERALITY (HCC): Primary | ICD-10-CM

## 2022-10-10 ENCOUNTER — HOSPITAL ENCOUNTER (OUTPATIENT)
Dept: WOUND CARE | Age: 77
Discharge: HOME | DRG: 580 | End: 2022-10-10
Payer: MEDICARE

## 2022-10-10 VITALS
RESPIRATION RATE: 18 BRPM | HEART RATE: 83 BPM | TEMPERATURE: 97.8 F | SYSTOLIC BLOOD PRESSURE: 126 MMHG | DIASTOLIC BLOOD PRESSURE: 64 MMHG

## 2022-10-10 DIAGNOSIS — L89.150 DECUBITUS ULCER OF SACRAL REGION, UNSTAGEABLE (HCC): Primary | ICD-10-CM

## 2022-10-10 PROCEDURE — 99214 OFFICE O/P EST MOD 30 MIN: CPT

## 2022-10-10 RX ORDER — MUPIROCIN 20 MG/G
OINTMENT TOPICAL ONCE
OUTPATIENT
Start: 2022-10-10 | End: 2022-10-10

## 2022-10-10 RX ORDER — BETAMETHASONE DIPROPIONATE 0.5 MG/G
OINTMENT TOPICAL ONCE
OUTPATIENT
Start: 2022-10-10 | End: 2022-10-10

## 2022-10-10 RX ORDER — BACITRACIN ZINC AND POLYMYXIN B SULFATE 500; 1000 [USP'U]/G; [USP'U]/G
OINTMENT TOPICAL ONCE
Status: CANCELLED | OUTPATIENT
Start: 2022-10-10 | End: 2022-10-10

## 2022-10-10 RX ORDER — MUPIROCIN 20 MG/G
OINTMENT TOPICAL ONCE
Status: CANCELLED | OUTPATIENT
Start: 2022-10-10 | End: 2022-10-10

## 2022-10-10 RX ORDER — LIDOCAINE 40 MG/G
CREAM TOPICAL ONCE
Status: CANCELLED | OUTPATIENT
Start: 2022-10-10 | End: 2022-10-10

## 2022-10-10 RX ORDER — LIDOCAINE 50 MG/G
OINTMENT TOPICAL ONCE
OUTPATIENT
Start: 2022-10-10 | End: 2022-10-10

## 2022-10-10 RX ORDER — TRIAMCINOLONE ACETONIDE 1 MG/G
OINTMENT TOPICAL ONCE
OUTPATIENT
Start: 2022-10-10 | End: 2022-10-10

## 2022-10-10 RX ORDER — LIDOCAINE 50 MG/G
OINTMENT TOPICAL ONCE
Status: CANCELLED | OUTPATIENT
Start: 2022-10-10 | End: 2022-10-10

## 2022-10-10 RX ORDER — SILVER SULFADIAZINE 10 G/1000G
CREAM TOPICAL ONCE
OUTPATIENT
Start: 2022-10-10 | End: 2022-10-10

## 2022-10-10 RX ORDER — GENTAMICIN SULFATE 1 MG/G
OINTMENT TOPICAL ONCE
Status: CANCELLED | OUTPATIENT
Start: 2022-10-10 | End: 2022-10-10

## 2022-10-10 RX ORDER — LIDOCAINE HYDROCHLORIDE 20 MG/ML
15 SOLUTION OROPHARYNGEAL ONCE
Status: CANCELLED | OUTPATIENT
Start: 2022-10-10 | End: 2022-10-10

## 2022-10-10 RX ORDER — LIDOCAINE HYDROCHLORIDE 40 MG/ML
SOLUTION TOPICAL ONCE
OUTPATIENT
Start: 2022-10-10 | End: 2022-10-10

## 2022-10-10 RX ORDER — LIDOCAINE 40 MG/G
CREAM TOPICAL ONCE
OUTPATIENT
Start: 2022-10-10 | End: 2022-10-10

## 2022-10-10 RX ORDER — LIDOCAINE HYDROCHLORIDE 20 MG/ML
JELLY TOPICAL ONCE
Status: CANCELLED | OUTPATIENT
Start: 2022-10-10 | End: 2022-10-10

## 2022-10-10 RX ORDER — TRIAMCINOLONE ACETONIDE 1 MG/G
OINTMENT TOPICAL ONCE
Status: CANCELLED | OUTPATIENT
Start: 2022-10-10 | End: 2022-10-10

## 2022-10-10 RX ORDER — LIDOCAINE HYDROCHLORIDE 20 MG/ML
15 SOLUTION OROPHARYNGEAL ONCE
OUTPATIENT
Start: 2022-10-10 | End: 2022-10-10

## 2022-10-10 RX ORDER — LIDOCAINE HYDROCHLORIDE 20 MG/ML
JELLY TOPICAL ONCE
OUTPATIENT
Start: 2022-10-10 | End: 2022-10-10

## 2022-10-10 RX ORDER — BETAMETHASONE DIPROPIONATE 0.5 MG/G
OINTMENT TOPICAL ONCE
Status: CANCELLED | OUTPATIENT
Start: 2022-10-10 | End: 2022-10-10

## 2022-10-10 RX ORDER — SILVER SULFADIAZINE 10 G/1000G
CREAM TOPICAL ONCE
Status: CANCELLED | OUTPATIENT
Start: 2022-10-10 | End: 2022-10-10

## 2022-10-10 RX ORDER — GENTAMICIN SULFATE 1 MG/G
OINTMENT TOPICAL ONCE
OUTPATIENT
Start: 2022-10-10 | End: 2022-10-10

## 2022-10-10 RX ORDER — BACITRACIN ZINC AND POLYMYXIN B SULFATE 500; 1000 [USP'U]/G; [USP'U]/G
OINTMENT TOPICAL ONCE
OUTPATIENT
Start: 2022-10-10 | End: 2022-10-10

## 2022-10-10 RX ORDER — CLOBETASOL PROPIONATE 0.5 MG/G
OINTMENT TOPICAL ONCE
OUTPATIENT
Start: 2022-10-10 | End: 2022-10-10

## 2022-10-10 RX ORDER — CLOBETASOL PROPIONATE 0.5 MG/G
OINTMENT TOPICAL ONCE
Status: CANCELLED | OUTPATIENT
Start: 2022-10-10 | End: 2022-10-10

## 2022-10-10 RX ORDER — BACITRACIN 500 [USP'U]/G
OINTMENT TOPICAL ONCE
OUTPATIENT
Start: 2022-10-10 | End: 2022-10-10

## 2022-10-10 RX ORDER — LIDOCAINE HYDROCHLORIDE 40 MG/ML
SOLUTION TOPICAL ONCE
Status: CANCELLED | OUTPATIENT
Start: 2022-10-10 | End: 2022-10-10

## 2022-10-10 RX ORDER — BACITRACIN 500 [USP'U]/G
OINTMENT TOPICAL ONCE
Status: CANCELLED | OUTPATIENT
Start: 2022-10-10 | End: 2022-10-10

## 2022-10-10 NOTE — DISCHARGE INSTRUCTIONS
Discharge Instructions/Wound [address], [address] [id number]  Telephone: [phone]  WOUND CARE ORDERS:  Sacrum :Cleanse with saline , apply primary dressing Silver Alginate cover with secondary dressing   border foam .  Pt./pcg/HH nurse to change (freq) 3 x week and as needed for compromise. Plan for surgical intervention. Call to set up appointment post surgery. TREATMENT ORDERS:  Turn/reposition every 2 hours when in bed, avoid direct pressure on wound site. When sitting, shift position or do seat lifts every 15 minutes. Limit side lying to 30 degree tilt. Limit HOB elevation to 30 degrees. Use speciality pressure relief cushion, mattress as appropriate. Follow diet as prescribed:  [x] Diet as tolerated: [x] Calorie Diabetic Diet:Low carb and no Sugar [] No Added Salt:[x] Increase Protein:              Return Appointment:  [x] Return Appointment: With DR Manuella Krabbe    [] Ordered tests:    Electronically signed Rossy Candelario RN on 10/10/2022 at 2:53 PM     [address] [place] [address] Information: Should you experience any significant changes in your wound(s) or have questions about your wound care, please contact the [address] [place] [address] at [address] [place] [address] 8:00 am - 4:30. If you need help with your wound outside these hours and cannot wait until we are again available, contact your PCP or go to the hospital emergency room. PLEASE NOTE: IF YOU ARE UNABLE TO OBTAIN WOUND SUPPLIES, CONTINUE TO USE THE SUPPLIES YOU HAVE AVAILABLE UNTIL YOU ARE ABLE TO REACH US. IT IS MOST IMPORTANT TO KEEP THE WOUND COVERED AT ALL TIMES.      Physician Signature:_______________________    Date: ___________ Time:  ____________

## 2022-10-10 NOTE — H&P
Wound Center  History and Physical    Date of Service: 10/10/22     Date of Initial Visit for Current Problem:  10/10/22    Subjective:     Chief Complaint:  Irish Harkins is a 76 y.o.  female who presents with sacral decubitus wound of several months duration. Referred by:  Dr. Tammy Reinoso    HPI:   Apparently developed this decubitus ulcer while in the hospital at [facility]. Wound caused by: chronic pressure/irritation. Current wound care:  Offloading wound: no  Appetite: good  Wound associated pain: mild  Diabetic: Yes  Smoker: no    Past Medical History:   Diagnosis Date    Cardiomy[address] 12/3/2020    HFrEF (heart failure with reduced ejection fraction) ([facility] 12/3/2020    Hypertension, uncontrolled 12/3/2020    Mobitz type 2 second degree atrioventricular block 28/8/8334    Systolic heart failure ([facility] 12/3/2020      History reviewed. No pertinent surgical history. Family History   Problem Relation Age of Onset    Diabetes Mother     Diabetes Father     Diabetes Son       Social History     Tobacco Use    Smoking status: Never     Passive exposure: Never    Smokeless tobacco: Never   Substance Use Topics    Alcohol use: Never       Prior to Admission medications    Medication Sig Start Date End Date Taking? Authorizing Provider   metoprolol tartrate (LOPRESSOR) 25 mg tablet Take 25 mg by mouth every twelve (12) hours. 9/11/22  Yes Provider, Historical   pregabalin (LYRICA) 50 mg capsule Take 1 Capsule by mouth two (2) times a day. Max Daily Amount: 100 mg. 5/4/22  Yes Jose Mg NP   cloNIDine HCL (CATAPRES) 0.2 mg tablet TAKE ONE TABLET BY MOUTH THREE TIMES DAILY 11/8/21  Yes Antonietta Newsome NP   amLODIPine (NORVASC) 10 mg tablet Take 1 Tablet by mouth daily.  11/8/21  Yes Antonietta Newsome NP   glucose blood VI test strips (OneTouch Verio test strips) strip USE TO CHECK BLOOD SUGAR ONCE DAILY FASTING 6/19/22   Jose Mg NP   aspirin delayed-release 81 mg tablet TAKE 1 TABLET BY MOUTH ONCE DAILY. Patient not taking: Reported on 9/22/2022 11/12/21   Alfreda Chung NP   acetaminophen (TYLENOL) 650 mg TbER Take 1 Tablet by mouth three (3) times daily as needed for Pain. 8/10/21   Alfreda Chung NP   flash glucose scanning reader (FreeStyle Clary 14 Day [place]) misc 1 Each by Does Not Apply route Once every 2 weeks. 6/3/21   Alfreda Chung NP   flash glucose sensor (FreeStyle Clary 14 Day Sensor) kit 1 Each by Does Not Apply route Once every 2 weeks. 6/3/21   Alfreda Chung NP   Blood-Glucose Meter monitoring kit Check sugar once daily fasting. E11.9 5/7/21   Alfreda Chung NP   lancets misc Use as directed. Dx: E11.9 Check sugar once daily fasting 5/7/21   Alfreda Chung NP   alcohol swabs padm 1 Each by Apply Externally route daily. For checking sugar 5/7/21   Masoud HUNTER NP   ammonium lactate (LAC-HYDRIN) 12 % lotion Apply 1 Each to affected area as needed (dry skin). rub in to affected area well 1/28/21   Alfreda Chung NP     Allergies   Allergen Reactions    Seafood Hives    Drug Ingredient [Cetirizine] Unknown (comments)     Mistaken entry        Review of Systems:  A comprehensive review of systems was negative except for that written in the History of Present Illness. and PMH. Objective:     Physical Exam:     Visit Vitals  /64   Pulse 83   Temp 97.8 °F (36.6 °C)   Resp 18     General: well developed, well nourished, pleasant , NAD. Hygiene good  Psych: cooperative. Pleasant. No anxiety or depression. Normal mood and affect. Neuro: alert and oriented to person/place/situation. Otherwise nonfocal.  HEENT: Normocephalic, atraumatic. EOMI. Conjunctiva clear. No scleral icterus. Chest: Respirations nonlabored. Abdomen:  Nondistended. Ulcer Location: Sacral   Etiology: combined  Wound: 10.2 x 10.0 x 2.6, with large unstageable eschar.    Ulcer bed: Necrotic eschar    Periwound: Normal  Exudate: Scant/small amount Serous exudate  Depth of Wound: Unknown. Assessment:     76 y.o. female with sacral decubitus combined ulcer. Plan:   Wound was at least mechanically debrided using a 4x4. More extensive debridement, if done, is outlined below. This wound requires operative debridement in the OR. Dressing: Silver aginate, border foam.   Frequency: every other day    We have set up appointment with general surgery tomorrow to evaluate for surgical debridement in the OR. Patient understood and agrees with plan. Questions answered. Weekly visits and serial debridements also discussed.   Follow up with me in 1 week    Signed By: Lesvia Olivia MD     October 10, 2022

## 2022-10-10 NOTE — WOUND CARE
10/10/22 1457   Wound Sacrum # 1 10/10/22   Date First Assessed/Time First Assessed: 10/10/22 1435   Present on Hospital Admission: Yes  Wound Approximate Age at First Assessment (Weeks): 8 weeks  Primary Wound Type: Pressure Injury  Location: Sacrum  Wound Description: # 1  Date of First Obser. ..    Dressing Status New dressing applied   Dressing/Treatment Alginate with Ag;Silicone border

## 2022-10-10 NOTE — WOUND CARE
10/10/22 1433   Wound Sacrum # 1 10/10/22   Date First Assessed/Time First Assessed: 10/10/22 1435   Present on Hospital Admission: Yes  Wound Approximate Age at First Assessment (Weeks): 8 weeks  Primary Wound Type: Pressure Injury  Location: Sacrum  Wound Description: # 1  Date of First Obser. ..    Wound Image    Wound Etiology Pressure Stage 4   Dressing Status Old drainage noted   Cleansed Cleansed with saline   Dressing/Treatment Silicone border  (removed)   Wound Length (cm) 10.2 cm   Wound Width (cm) 10 cm   Wound Depth (cm) 2.6 cm   Wound Surface Area (cm^2) 102 cm^2   Wound Volume (cm^3) 265.2 cm^3   Wound Assessment Pink/red;Eschar moist   Drainage Amount Moderate   Drainage Description Serosanguinous   Wound Odor Mild   Juana-Wound/Incision Assessment Intact   Edges Epibole (rolled edges)   Wound Thickness Description Full thickness   Pain 1   Pain Scale 1 Numeric (0 - 10)   Pain Intensity 1 0   Patient Stated Pain Goal 0   Pain Reassessment 1 Yes   Visit Vitals  /64   Pulse 83   Temp 97.8 °F (36.6 °C)   Resp 18

## 2022-10-11 ENCOUNTER — OFFICE VISIT (OUTPATIENT)
Dept: SURGERY | Age: 77
End: 2022-10-11
Payer: MEDICARE

## 2022-10-11 VITALS
RESPIRATION RATE: 20 BRPM | HEIGHT: 66 IN | TEMPERATURE: 98.7 F | OXYGEN SATURATION: 92 % | DIASTOLIC BLOOD PRESSURE: 73 MMHG | BODY MASS INDEX: 27.12 KG/M2 | SYSTOLIC BLOOD PRESSURE: 119 MMHG | HEART RATE: 87 BPM

## 2022-10-11 DIAGNOSIS — S31.000A WOUND OF SACRAL REGION, INITIAL ENCOUNTER: Primary | ICD-10-CM

## 2022-10-11 PROCEDURE — 99203 OFFICE O/P NEW LOW 30 MIN: CPT | Performed by: SURGERY

## 2022-10-11 PROCEDURE — G8752 SYS BP LESS 140: HCPCS | Performed by: SURGERY

## 2022-10-11 PROCEDURE — G8536 NO DOC ELDER MAL SCRN: HCPCS | Performed by: SURGERY

## 2022-10-11 PROCEDURE — G8427 DOCREV CUR MEDS BY ELIG CLIN: HCPCS | Performed by: SURGERY

## 2022-10-11 PROCEDURE — G8754 DIAS BP LESS 90: HCPCS | Performed by: SURGERY

## 2022-10-11 PROCEDURE — G8432 DEP SCR NOT DOC, RNG: HCPCS | Performed by: SURGERY

## 2022-10-11 PROCEDURE — 1123F ACP DISCUSS/DSCN MKR DOCD: CPT | Performed by: SURGERY

## 2022-10-11 PROCEDURE — 1090F PRES/ABSN URINE INCON ASSESS: CPT | Performed by: SURGERY

## 2022-10-11 PROCEDURE — G8399 PT W/DXA RESULTS DOCUMENT: HCPCS | Performed by: SURGERY

## 2022-10-11 PROCEDURE — 1101F PT FALLS ASSESS-DOCD LE1/YR: CPT | Performed by: SURGERY

## 2022-10-11 PROCEDURE — G8417 CALC BMI ABV UP PARAM F/U: HCPCS | Performed by: SURGERY

## 2022-10-11 RX ORDER — CEPHALEXIN 500 MG/1
500 CAPSULE ORAL 4 TIMES DAILY
Qty: 40 CAPSULE | Refills: 0 | Status: SHIPPED | OUTPATIENT
Start: 2022-10-11 | End: 2022-10-18

## 2022-10-11 NOTE — PROGRESS NOTES
Identified pt with two pt identifiers(name and ). Reviewed record in preparation for visit and have obtained necessary documentation. All patient medications has been reviewed. Chief Complaint   Patient presents with    Skin Problem     Seen at the request of Dr. Roman alas sacral wound debridement        Health Maintenance Due   Topic    Hepatitis C Screening     Eye Exam Retinal or Dilated     DTaP/Tdap/Td series (1 - Tdap)    Shingrix Vaccine Age 50> (1 of 2)    COVID-19 Vaccine (4 - Booster for [place] [place]'s series)    Flu Vaccine (1)       Vitals:    10/11/22 1318   BP: 119/73   Pulse: 87   Resp: 20   Temp: 98.7 °F (37.1 °C)   SpO2: 92%   Height: 5' 6\" (1.676 m)   PainSc:   0 - No pain       4. Have you been to the ER, urgent care clinic since your last visit? Hospitalized since your last visit? No    5. Have you seen or consulted any other health care providers outside of the [address] [place] [address] since your last visit? Include any pap smears or colon screening. No      Patient is accompanied by son I have received verbal consent from Mario Mendiola to discuss any/all medical information while they are present in the room.

## 2022-10-11 NOTE — PERIOP NOTES
[facility]  Preoperative Instructions        Surgery Date 10/12/22          Time of Arrival 1500  Contact # adalid Wiley [phone]  1. On the day of your surgery, please report to the Surgical Services Registration Desk and sign in at your designated time. The Surgery Center is located to the right of the Emergency Room. 2. You must have someone with you to drive you home. You should not drive a car for 24 hours following surgery. Please make arrangements for a friend or family member to stay with you for the first 24 hours after your surgery. 3. Do not have anything to eat or drink (including water, gum, mints, coffee, juice) after midnight ?10/11/22  . ? This may not apply to medications prescribed by your physician. ?(Please note below the special instructions with medications to take the morning of your procedure.)    4. We recommend you do not drink any alcoholic beverages for 24 hours before and after your surgery. 5. Contact your surgeons office for instructions on the following medications: non-steroidal anti-inflammatory drugs (i.e. Advil, Aleve), vitamins, and supplements. (Some surgeons will want you to stop these medications prior to surgery and others may allow you to take them)  **If you are currently taking Plavix, Coumadin, Aspirin and/or other blood-thinning agents, contact your surgeon for instructions. ** Your surgeon will partner with the physician prescribing these medications to determine if it is safe to stop or if you need to continue taking. Please do not stop taking these medications without instructions from your surgeon    6. Wear comfortable clothes. Wear glasses instead of contacts. Do not bring any money or jewelry. Please bring picture ID, insurance card, and any prearranged co-payment or hospital payment. Do not wear make-up, particularly mascara the morning of your surgery.   Do not wear nail polish, particularly if you are having foot /hand surgery. Wear your hair loose or down, no ponytails, buns, мария pins or clips. All body piercings must be removed. Please shower with antibacterial soap for three consecutive days before and on the morning of surgery, but do not apply any lotions, powders or deodorants after the shower on the day of surgery. Please use a fresh towels after each shower. Please sleep in clean clothes and change bed linens the night before surgery. Please do not shave for 48 hours prior to surgery. Shaving of the face is acceptable. 7. You should understand that if you do not follow these instructions your surgery may be cancelled. If your physical condition changes (I.e. fever, cold or flu) please contact your surgeon as soon as possible. 8. It is important that you be on time. If a situation occurs where you may be late, please call [phone] (OR Holding Area). 9. If you have any questions and or problems, please call [phone] (Pre-admission Testing). 10. Your surgery time may be subject to change. You will receive a phone call the evening prior if your time changes. 11.  If having outpatient surgery, you must have someone to drive you here, stay with you during the duration of your stay, and to drive you home at time of discharge. TAKE ALL MEDICATIONS DAY OF SURGERY EXCEPT:no restrictions      I understand a pre-operative phone call will be made to verify my surgery time. In the event that I am not available, I give permission for a message to be left on my answering service and/or with another person?   yes         ___________________      __________   _________    (Signature of Patient)             (Witness)                (Date and Time)

## 2022-10-11 NOTE — PROGRESS NOTES
LM for dale at Dr Joy's office for orders to be faxed for case for tomorrow. Called direct line for Dr Joy's office. Joan Brambila said that she will fax now.

## 2022-10-11 NOTE — H&P (VIEW-ONLY)
To: Elijio Duverney, MD  CC:  Jose A Smith., NP    From: Kandice Patel MD    Thank you for sending Dagmar Harrell to see us. Please note that this dictation was completed with Grows Up, the computer voice recognition software. Quite often unanticipated grammatical, syntax, homophones, and other interpretive errors are inadvertently transcribed by the software. Please disregard these errors. Please excuse any errors that have escaped final proofreading. Encounter Date: 10/11/2022  History and Physical    Assessment:   Deep tissue injury over the sacrum after hospitalization for fluid overload and subsequent rehab stint at [facility]. Necrotic skin and adipose. No pus no spreading infection. Body mass index is 27.12 kg/m². Comorbid myopathy with history of congestive heart failure, diabetes. No anticoagulation. Plan: Will start her on antibiotics given her history of diabetes despite the lack of obvious infection. We will bring her into the OR tomorrow to debride the necrotic tissues. We will then have her follow back up at the wound care center for possible hyperbaric oxygen and other specialized treatments to improve her chance of healing. Risks including bleeding and infection were explained to the patient. The patient expressed understanding of the risks, and all questions were answered to the patient's satisfaction. HPI:   Dagmar Harrell is a 76 y.o. female who is seen for decubitus wound. She had been hospitalized recently for congestive heart failure and after treatment for this was discharged to [facility] rehab. The family is unsure when the sacral wound developed but she was seen yesterday at the wound care center and was referred here for consideration of debridement. She is not having any fevers. She is using a rolling chair. She does not have an air mattress at home. I do not have access to her hospital records.   It does not appear that she was hospitalized at a [facility]. They were advised to go to the ER but an appointment was made here and they did not like go to the ER. Past Medical History:   Diagnosis Date    Cardiomyopathy ([facility] 12/3/2020    [facility] 12/3/2020    Hypertension, uncontrolled 12/3/2020    Mobitz type 2 second degree atrioventricular block 82/9/8140    Systolic heart failure ([facility] 12/3/2020     History reviewed. No pertinent surgical history. Family History   Problem Relation Age of Onset    Diabetes Mother     Diabetes Father     Diabetes Son      Social History     Tobacco Use    Smoking status: Never     Passive exposure: Never    Smokeless tobacco: Never   Substance Use Topics    Alcohol use: Never      Current Outpatient Medications   Medication Sig    metoprolol tartrate (LOPRESSOR) 25 mg tablet Take 25 mg by mouth every twelve (12) hours. glucose blood VI test strips (OneTouch Verio test strips) strip USE TO CHECK BLOOD SUGAR ONCE DAILY FASTING    pregabalin (LYRICA) 50 mg capsule Take 1 Capsule by mouth two (2) times a day. Max Daily Amount: 100 mg.    cloNIDine HCL (CATAPRES) 0.2 mg tablet TAKE ONE TABLET BY MOUTH THREE TIMES DAILY    amLODIPine (NORVASC) 10 mg tablet Take 1 Tablet by mouth daily. acetaminophen (TYLENOL) 650 mg TbER Take 1 Tablet by mouth three (3) times daily as needed for Pain. flash glucose scanning reader (FreeStyle Clary 14 Day [place]) misc 1 Each by Does Not Apply route Once every 2 weeks. flash glucose sensor (FreeStyle Clary 14 Day Sensor) kit 1 Each by Does Not Apply route Once every 2 weeks. Blood-Glucose Meter monitoring kit Check sugar once daily fasting. E11.9    lancets misc Use as directed. Dx: E11.9 Check sugar once daily fasting    alcohol swabs padm 1 Each by Apply Externally route daily. For checking sugar    ammonium lactate (LAC-HYDRIN) 12 % lotion Apply 1 Each to affected area as needed (dry skin).  rub in to affected area well    aspirin delayed-release 81 mg tablet TAKE 1 TABLET BY MOUTH ONCE DAILY. (Patient not taking: No sig reported)     No current facility-administered medications for this visit. Allergies: Allergies   Allergen Reactions    Seafood Hives    Drug Ingredient [Cetirizine] Unknown (comments)     Mistaken entry       Review of Systems:  10 systems reviewed. See scanned sheet in \"Media\" section. See HPI for pertinent positives and negatives. Objective:   Visit Vitals  /73 (BP 1 Location: Left upper arm, BP Patient Position: Sitting, BP Cuff Size: Adult)   Pulse 87   Temp 98.7 °F (37.1 °C)   Resp 20   Ht 5' 6\" (1.676 m)   SpO2 92%   BMI 27.12 kg/m²       Physical Exam:  General appearance  Alert, cooperative, no distress, appears stated age   HEENT Anicteric           Lungs   Clear to auscultation bilaterally   Heart  Regular rate and rhythm. Abdomen   Soft, non-tender. Neurologic Without overt sensory or motor deficit     Focused exam of the sacrum reveals a 10 x 8 area of necrotic skin which has  from the viable skin. There is no pus draining. There is no spreading erythema.     Signed By: Lowell Chavez MD     October 11, 2022

## 2022-10-11 NOTE — PROGRESS NOTES
To: Catracho Michele MD  CC:  Jose Aden., NP    From: Penny Ac MD    Thank you for sending Irish Harkins to see us. Please note that this dictation was completed with Exit41, the computer voice recognition software. Quite often unanticipated grammatical, syntax, homophones, and other interpretive errors are inadvertently transcribed by the software. Please disregard these errors. Please excuse any errors that have escaped final proofreading. Encounter Date: 10/11/2022  History and Physical    Assessment:   Deep tissue injury over the sacrum after hospitalization for fluid overload and subsequent rehab stint at [facility]. Necrotic skin and adipose. No pus no spreading infection. Body mass index is 27.12 kg/m². Comorbid myopathy with history of congestive heart failure, diabetes. No anticoagulation. Plan: Will start her on antibiotics given her history of diabetes despite the lack of obvious infection. We will bring her into the OR tomorrow to debride the necrotic tissues. We will then have her follow back up at the wound care center for possible hyperbaric oxygen and other specialized treatments to improve her chance of healing. Risks including bleeding and infection were explained to the patient. The patient expressed understanding of the risks, and all questions were answered to the patient's satisfaction. HPI:   Irish Harkins is a 76 y.o. female who is seen for decubitus wound. She had been hospitalized recently for congestive heart failure and after treatment for this was discharged to [facility] rehab. The family is unsure when the sacral wound developed but she was seen yesterday at the wound care center and was referred here for consideration of debridement. She is not having any fevers. She is using a rolling chair. She does not have an air mattress at home. I do not have access to her hospital records.   It does not appear that she was hospitalized at a [facility]. They were advised to go to the ER but an appointment was made here and they did not like go to the ER. Past Medical History:   Diagnosis Date    Cardiomyopathy ([facility] 12/3/2020    HFrEF (heart failure with reduced ejection fraction) ([facility] 12/3/2020    Hypertension, uncontrolled 12/3/2020    Mobitz type 2 second degree atrioventricular block 76/7/7694    Systolic heart failure ([facility] 12/3/2020     History reviewed. No pertinent surgical history. Family History   Problem Relation Age of Onset    Diabetes Mother     Diabetes Father     Diabetes Son      Social History     Tobacco Use    Smoking status: Never     Passive exposure: Never    Smokeless tobacco: Never   Substance Use Topics    Alcohol use: Never      Current Outpatient Medications   Medication Sig    metoprolol tartrate (LOPRESSOR) 25 mg tablet Take 25 mg by mouth every twelve (12) hours. glucose blood VI test strips (OneTouch Verio test strips) strip USE TO CHECK BLOOD SUGAR ONCE DAILY FASTING    pregabalin (LYRICA) 50 mg capsule Take 1 Capsule by mouth two (2) times a day. Max Daily Amount: 100 mg.    cloNIDine HCL (CATAPRES) 0.2 mg tablet TAKE ONE TABLET BY MOUTH THREE TIMES DAILY    amLODIPine (NORVASC) 10 mg tablet Take 1 Tablet by mouth daily. acetaminophen (TYLENOL) 650 mg TbER Take 1 Tablet by mouth three (3) times daily as needed for Pain. flash glucose scanning reader (FreeStyle Clary 14 Day [place]) misc 1 Each by Does Not Apply route Once every 2 weeks. flash glucose sensor (FreeStyle Clary 14 Day Sensor) kit 1 Each by Does Not Apply route Once every 2 weeks. Blood-Glucose Meter monitoring kit Check sugar once daily fasting. E11.9    lancets misc Use as directed. Dx: E11.9 Check sugar once daily fasting    alcohol swabs padm 1 Each by Apply Externally route daily. For checking sugar    ammonium lactate (LAC-HYDRIN) 12 % lotion Apply 1 Each to affected area as needed (dry skin).  rub in to affected area well    aspirin delayed-release 81 mg tablet TAKE 1 TABLET BY MOUTH ONCE DAILY. (Patient not taking: No sig reported)     No current facility-administered medications for this visit. Allergies: Allergies   Allergen Reactions    Seafood Hives    Drug Ingredient [Cetirizine] Unknown (comments)     Mistaken entry       Review of Systems:  10 systems reviewed. See scanned sheet in \"Media\" section. See HPI for pertinent positives and negatives. Objective:   Visit Vitals  /73 (BP 1 Location: Left upper arm, BP Patient Position: Sitting, BP Cuff Size: Adult)   Pulse 87   Temp 98.7 °F (37.1 °C)   Resp 20   Ht 5' 6\" (1.676 m)   SpO2 92%   BMI 27.12 kg/m²       Physical Exam:  General appearance  Alert, cooperative, no distress, appears stated age   HEENT Anicteric           Lungs   Clear to auscultation bilaterally   Heart  Regular rate and rhythm. Abdomen   Soft, non-tender. Neurologic Without overt sensory or motor deficit     Focused exam of the sacrum reveals a 10 x 8 area of necrotic skin which has  from the viable skin. There is no pus draining. There is no spreading erythema.     Signed By: Rosa Isela Pate MD     October 11, 2022

## 2022-10-11 NOTE — Clinical Note
10/11/2022    Patient: Hasmukh Cantrell   YOB: 1945   Date of Visit: 10/11/2022     Krystin Fishman. Alvin Bowie  [address] [facility]  [phone]  Via Fax: [phone]    Dear Krystin Fishman. BONG Bowie MD,      Thank you for referring Ms. Hasmukh Cantrell to [address] for evaluation. My notes for this consultation are attached. If you have questions, please do not hesitate to call me. I look forward to following your patient along with you.       Sincerely,    Lowell Chavez MD

## 2022-10-12 ENCOUNTER — HOSPITAL ENCOUNTER (INPATIENT)
Age: 77
LOS: 5 days | Discharge: HOME HEALTH | DRG: 580 | End: 2022-10-18
Attending: SURGERY | Admitting: SURGERY
Payer: MEDICARE

## 2022-10-12 ENCOUNTER — ANESTHESIA EVENT (OUTPATIENT)
Dept: SURGERY | Age: 77
DRG: 580 | End: 2022-10-12
Payer: MEDICARE

## 2022-10-12 ENCOUNTER — ANESTHESIA (OUTPATIENT)
Dept: SURGERY | Age: 77
DRG: 580 | End: 2022-10-12
Payer: MEDICARE

## 2022-10-12 DIAGNOSIS — S31.000D WOUND OF SACRAL REGION, SUBSEQUENT ENCOUNTER: Primary | ICD-10-CM

## 2022-10-12 PROBLEM — S31.000A SACRAL WOUND: Status: ACTIVE | Noted: 2022-10-12

## 2022-10-12 LAB
ATRIAL RATE: 76 BPM
CALCULATED P AXIS, ECG09: 33 DEGREES
CALCULATED R AXIS, ECG10: -42 DEGREES
CALCULATED T AXIS, ECG11: 1 DEGREES
DIAGNOSIS, 93000: NORMAL
GLUCOSE BLD STRIP.AUTO-MCNC: 100 MG/DL (ref 65–117)
GLUCOSE BLD STRIP.AUTO-MCNC: 112 MG/DL (ref 65–117)
P-R INTERVAL, ECG05: 178 MS
Q-T INTERVAL, ECG07: 428 MS
QRS DURATION, ECG06: 120 MS
QTC CALCULATION (BEZET), ECG08: 481 MS
SERVICE CMNT-IMP: NORMAL
SERVICE CMNT-IMP: NORMAL
VENTRICULAR RATE, ECG03: 76 BPM

## 2022-10-12 PROCEDURE — 74011250636 HC RX REV CODE- 250/636: Performed by: REGISTERED NURSE

## 2022-10-12 PROCEDURE — 76010000138 HC OR TIME 0.5 TO 1 HR: Performed by: SURGERY

## 2022-10-12 PROCEDURE — 76210000006 HC OR PH I REC 0.5 TO 1 HR: Performed by: SURGERY

## 2022-10-12 PROCEDURE — G0378 HOSPITAL OBSERVATION PER HR: HCPCS

## 2022-10-12 PROCEDURE — 74011250636 HC RX REV CODE- 250/636: Performed by: SURGERY

## 2022-10-12 PROCEDURE — 93005 ELECTROCARDIOGRAM TRACING: CPT

## 2022-10-12 PROCEDURE — 74011000250 HC RX REV CODE- 250: Performed by: REGISTERED NURSE

## 2022-10-12 PROCEDURE — 74011250636 HC RX REV CODE- 250/636: Performed by: ANESTHESIOLOGY

## 2022-10-12 PROCEDURE — 77030027382 HC TU SET SONICONE MSNX -F: Performed by: SURGERY

## 2022-10-12 PROCEDURE — 76060000032 HC ANESTHESIA 0.5 TO 1 HR: Performed by: SURGERY

## 2022-10-12 PROCEDURE — 74011000250 HC RX REV CODE- 250: Performed by: SURGERY

## 2022-10-12 PROCEDURE — 87205 SMEAR GRAM STAIN: CPT

## 2022-10-12 PROCEDURE — 87075 CULTR BACTERIA EXCEPT BLOOD: CPT

## 2022-10-12 PROCEDURE — 77030026438 HC STYL ET INTUB CARD -A: Performed by: STUDENT IN AN ORGANIZED HEALTH CARE EDUCATION/TRAINING PROGRAM

## 2022-10-12 PROCEDURE — 87186 SC STD MICRODIL/AGAR DIL: CPT

## 2022-10-12 PROCEDURE — 87077 CULTURE AEROBIC IDENTIFY: CPT

## 2022-10-12 PROCEDURE — 0KBP0ZZ EXCISION OF LEFT HIP MUSCLE, OPEN APPROACH: ICD-10-PCS | Performed by: SURGERY

## 2022-10-12 PROCEDURE — 0KBN0ZZ EXCISION OF RIGHT HIP MUSCLE, OPEN APPROACH: ICD-10-PCS | Performed by: SURGERY

## 2022-10-12 PROCEDURE — 87185 SC STD ENZYME DETCJ PER NZM: CPT

## 2022-10-12 PROCEDURE — 74011000258 HC RX REV CODE- 258: Performed by: SURGERY

## 2022-10-12 PROCEDURE — 77030008684 HC TU ET CUF COVD -B: Performed by: STUDENT IN AN ORGANIZED HEALTH CARE EDUCATION/TRAINING PROGRAM

## 2022-10-12 PROCEDURE — 77030019908 HC STETH ESOPH SIMS -A: Performed by: STUDENT IN AN ORGANIZED HEALTH CARE EDUCATION/TRAINING PROGRAM

## 2022-10-12 PROCEDURE — 2709999900 HC NON-CHARGEABLE SUPPLY: Performed by: SURGERY

## 2022-10-12 PROCEDURE — 11043 DBRDMT MUSC&/FSCA 1ST 20/<: CPT | Performed by: SURGERY

## 2022-10-12 PROCEDURE — 82962 GLUCOSE BLOOD TEST: CPT

## 2022-10-12 RX ORDER — PREGABALIN 25 MG/1
50 CAPSULE ORAL AS NEEDED
Status: DISCONTINUED | OUTPATIENT
Start: 2022-10-12 | End: 2022-10-18 | Stop reason: HOSPADM

## 2022-10-12 RX ORDER — SODIUM CHLORIDE 9 MG/ML
50 INJECTION, SOLUTION INTRAVENOUS CONTINUOUS
Status: DISCONTINUED | OUTPATIENT
Start: 2022-10-12 | End: 2022-10-14

## 2022-10-12 RX ORDER — CLONIDINE HYDROCHLORIDE 0.1 MG/1
0.2 TABLET ORAL EVERY MORNING
Status: DISCONTINUED | OUTPATIENT
Start: 2022-10-13 | End: 2022-10-18 | Stop reason: HOSPADM

## 2022-10-12 RX ORDER — SODIUM CHLORIDE 0.9 % (FLUSH) 0.9 %
5-40 SYRINGE (ML) INJECTION EVERY 8 HOURS
Status: DISCONTINUED | OUTPATIENT
Start: 2022-10-12 | End: 2022-10-18 | Stop reason: HOSPADM

## 2022-10-12 RX ORDER — AMLODIPINE BESYLATE 5 MG/1
10 TABLET ORAL DAILY
Status: DISCONTINUED | OUTPATIENT
Start: 2022-10-13 | End: 2022-10-18 | Stop reason: HOSPADM

## 2022-10-12 RX ORDER — HYDROCODONE BITARTRATE AND ACETAMINOPHEN 5; 325 MG/1; MG/1
1-2 TABLET ORAL
Status: DISCONTINUED | OUTPATIENT
Start: 2022-10-12 | End: 2022-10-18 | Stop reason: HOSPADM

## 2022-10-12 RX ORDER — SODIUM CHLORIDE, SODIUM LACTATE, POTASSIUM CHLORIDE, CALCIUM CHLORIDE 600; 310; 30; 20 MG/100ML; MG/100ML; MG/100ML; MG/100ML
25 INJECTION, SOLUTION INTRAVENOUS CONTINUOUS
Status: DISCONTINUED | OUTPATIENT
Start: 2022-10-12 | End: 2022-10-12 | Stop reason: HOSPADM

## 2022-10-12 RX ORDER — POLYETHYLENE GLYCOL 3350 17 G/17G
17 POWDER, FOR SOLUTION ORAL DAILY
Status: DISCONTINUED | OUTPATIENT
Start: 2022-10-13 | End: 2022-10-18 | Stop reason: HOSPADM

## 2022-10-12 RX ORDER — HYDROMORPHONE HYDROCHLORIDE 1 MG/ML
0.5 INJECTION, SOLUTION INTRAMUSCULAR; INTRAVENOUS; SUBCUTANEOUS
Status: DISCONTINUED | OUTPATIENT
Start: 2022-10-12 | End: 2022-10-18 | Stop reason: HOSPADM

## 2022-10-12 RX ORDER — PROPOFOL 10 MG/ML
INJECTION, EMULSION INTRAVENOUS AS NEEDED
Status: DISCONTINUED | OUTPATIENT
Start: 2022-10-12 | End: 2022-10-12 | Stop reason: HOSPADM

## 2022-10-12 RX ORDER — SODIUM CHLORIDE 0.9 % (FLUSH) 0.9 %
5-40 SYRINGE (ML) INJECTION AS NEEDED
Status: DISCONTINUED | OUTPATIENT
Start: 2022-10-12 | End: 2022-10-18 | Stop reason: HOSPADM

## 2022-10-12 RX ORDER — DEXAMETHASONE SODIUM PHOSPHATE 4 MG/ML
INJECTION, SOLUTION INTRA-ARTICULAR; INTRALESIONAL; INTRAMUSCULAR; INTRAVENOUS; SOFT TISSUE AS NEEDED
Status: DISCONTINUED | OUTPATIENT
Start: 2022-10-12 | End: 2022-10-12 | Stop reason: HOSPADM

## 2022-10-12 RX ORDER — LIDOCAINE HYDROCHLORIDE 20 MG/ML
INJECTION, SOLUTION EPIDURAL; INFILTRATION; INTRACAUDAL; PERINEURAL AS NEEDED
Status: DISCONTINUED | OUTPATIENT
Start: 2022-10-12 | End: 2022-10-12 | Stop reason: HOSPADM

## 2022-10-12 RX ORDER — NALOXONE HYDROCHLORIDE 0.4 MG/ML
0.4 INJECTION, SOLUTION INTRAMUSCULAR; INTRAVENOUS; SUBCUTANEOUS AS NEEDED
Status: DISCONTINUED | OUTPATIENT
Start: 2022-10-12 | End: 2022-10-18 | Stop reason: HOSPADM

## 2022-10-12 RX ORDER — ROCURONIUM BROMIDE 10 MG/ML
INJECTION, SOLUTION INTRAVENOUS AS NEEDED
Status: DISCONTINUED | OUTPATIENT
Start: 2022-10-12 | End: 2022-10-12 | Stop reason: HOSPADM

## 2022-10-12 RX ORDER — METOPROLOL TARTRATE 25 MG/1
25 TABLET, FILM COATED ORAL EVERY 12 HOURS
Status: DISCONTINUED | OUTPATIENT
Start: 2022-10-12 | End: 2022-10-18 | Stop reason: HOSPADM

## 2022-10-12 RX ORDER — ONDANSETRON 2 MG/ML
4 INJECTION INTRAMUSCULAR; INTRAVENOUS
Status: DISCONTINUED | OUTPATIENT
Start: 2022-10-12 | End: 2022-10-18 | Stop reason: HOSPADM

## 2022-10-12 RX ORDER — ENOXAPARIN SODIUM 100 MG/ML
40 INJECTION SUBCUTANEOUS EVERY 24 HOURS
Status: DISCONTINUED | OUTPATIENT
Start: 2022-10-13 | End: 2022-10-17

## 2022-10-12 RX ORDER — MIDAZOLAM HYDROCHLORIDE 1 MG/ML
INJECTION, SOLUTION INTRAMUSCULAR; INTRAVENOUS AS NEEDED
Status: DISCONTINUED | OUTPATIENT
Start: 2022-10-12 | End: 2022-10-12 | Stop reason: HOSPADM

## 2022-10-12 RX ORDER — ONDANSETRON 2 MG/ML
INJECTION INTRAMUSCULAR; INTRAVENOUS AS NEEDED
Status: DISCONTINUED | OUTPATIENT
Start: 2022-10-12 | End: 2022-10-12 | Stop reason: HOSPADM

## 2022-10-12 RX ORDER — HYDROMORPHONE HYDROCHLORIDE 2 MG/ML
INJECTION, SOLUTION INTRAMUSCULAR; INTRAVENOUS; SUBCUTANEOUS AS NEEDED
Status: DISCONTINUED | OUTPATIENT
Start: 2022-10-12 | End: 2022-10-12 | Stop reason: HOSPADM

## 2022-10-12 RX ORDER — PHENYLEPHRINE HCL IN 0.9% NACL 0.4MG/10ML
SYRINGE (ML) INTRAVENOUS AS NEEDED
Status: DISCONTINUED | OUTPATIENT
Start: 2022-10-12 | End: 2022-10-12 | Stop reason: HOSPADM

## 2022-10-12 RX ADMIN — MIDAZOLAM HYDROCHLORIDE 0.5 MG: 1 INJECTION, SOLUTION INTRAMUSCULAR; INTRAVENOUS at 18:37

## 2022-10-12 RX ADMIN — SODIUM CHLORIDE, POTASSIUM CHLORIDE, SODIUM LACTATE AND CALCIUM CHLORIDE: 600; 310; 30; 20 INJECTION, SOLUTION INTRAVENOUS at 18:37

## 2022-10-12 RX ADMIN — PROPOFOL 80 MG: 10 INJECTION, EMULSION INTRAVENOUS at 18:49

## 2022-10-12 RX ADMIN — ONDANSETRON HYDROCHLORIDE 4 MG: 2 INJECTION, SOLUTION INTRAMUSCULAR; INTRAVENOUS at 19:02

## 2022-10-12 RX ADMIN — PIPERACILLIN AND TAZOBACTAM 3.38 G: 3; .375 INJECTION, POWDER, FOR SOLUTION INTRAVENOUS at 21:48

## 2022-10-12 RX ADMIN — ROCURONIUM BROMIDE 30 MG: 10 INJECTION INTRAVENOUS at 18:49

## 2022-10-12 RX ADMIN — LIDOCAINE HYDROCHLORIDE 40 MG: 20 INJECTION, SOLUTION EPIDURAL; INFILTRATION; INTRACAUDAL; PERINEURAL at 18:49

## 2022-10-12 RX ADMIN — SUGAMMADEX 200 MG: 100 INJECTION, SOLUTION INTRAVENOUS at 19:25

## 2022-10-12 RX ADMIN — HYDROMORPHONE HYDROCHLORIDE 0.2 MG: 2 INJECTION, SOLUTION INTRAMUSCULAR; INTRAVENOUS; SUBCUTANEOUS at 18:37

## 2022-10-12 RX ADMIN — Medication 80 MCG: at 19:02

## 2022-10-12 RX ADMIN — WATER 2 G: 1 INJECTION INTRAMUSCULAR; INTRAVENOUS; SUBCUTANEOUS at 18:55

## 2022-10-12 RX ADMIN — SODIUM CHLORIDE 50 ML/HR: 9 INJECTION, SOLUTION INTRAVENOUS at 21:45

## 2022-10-12 RX ADMIN — DEXAMETHASONE SODIUM PHOSPHATE 4 MG: 4 INJECTION, SOLUTION INTRAMUSCULAR; INTRAVENOUS at 19:02

## 2022-10-12 NOTE — PERIOP NOTES
Handoff Report from Operating Room to PACU    Report received from [facility]  and [facility] regarding Jessica Thurman. Surgeon(s):  Boris Cooley MD  And Procedure(s) (LRB):  INCISION AND DRAINAGE OF SACRUM (N/A)  confirmed   with allergies and dressings discussed. Anesthesia type, drugs, patient history, complications, estimated blood loss, vital signs, intake and output, and last pain medication were reviewed.

## 2022-10-12 NOTE — ANESTHESIA PREPROCEDURE EVALUATION
Relevant Problems   CARDIOVASCULAR   (+) Hypertension, uncontrolled   (+) Mobitz type 2 second degree atrioventricular block      RENAL FAILURE   (+) Chronic renal disease, stage III      ENDOCRINE   (+) Type 2 diabetes mellitus with chronic kidney disease (HCC)       Anesthetic History   No history of anesthetic complications            Review of Systems / Medical History  Patient summary reviewed, nursing notes reviewed and pertinent labs reviewed    Pulmonary  Within defined limits                 Neuro/Psych   Within defined limits           Cardiovascular    Hypertension      CHF  Dysrhythmias       Exercise tolerance: >4 METS  Comments: ECHO 2021: EF returned to normal (60%)   GI/Hepatic/Renal  Within defined limits              Endo/Other    Diabetes         Other Findings              Physical Exam    Airway  Mallampati: II  TM Distance: 4 - 6 cm  Neck ROM: normal range of motion   Mouth opening: Normal     Cardiovascular  Regular rate and rhythm,  S1 and S2 normal,  no murmur, click, rub, or gallop             Dental  No notable dental hx       Pulmonary  Breath sounds clear to auscultation               Abdominal  GI exam deferred       Other Findings            Anesthetic Plan    ASA: 3  Anesthesia type: general          Induction: Intravenous  Anesthetic plan and risks discussed with: Patient

## 2022-10-12 NOTE — BRIEF OP NOTE
[id number]  Brief Postoperative Note    Patient: Lacey De Leon  YOB: 1945  MRN: [id number]    Date of Procedure: 10/12/2022     Pre-Op Diagnosis: sacral decubitus wound    Post-Op Diagnosis: Same as preoperative diagnosis.       Procedure(s):  DEBRIDEMENT WITH MISONIX    Surgeon(s):  Alto Closs, MD    Surgical Assistant: None    Anesthesia: General     Estimated Blood Loss (mL): less than 50     Complications: None immediate    Specimens:   ID Type Source Tests Collected by Time Destination   1 : Sacral wound Wound Sacrum AEROBIC CULTURE + GRAM STAIN, ANAEROBIC CULTURE Alto Closs, MD 10/12/2022 1906 Microbiology        Implants: * No implants in log *    Drains: * No LDAs found *    Findings: 100sqcm necrotic skin, adipose, muscle    Electronically Signed by Krystal Turner MD on 10/12/2022 at 7:31 PM

## 2022-10-12 NOTE — PROGRESS NOTES
1530-Pt's son Leopoldo Closs completed pt's admission history. 1635-Surgeon made aware that team was unable to obtain lab work. No new orders received at this time.

## 2022-10-13 LAB
ANION GAP SERPL CALC-SCNC: 11 MMOL/L (ref 5–15)
BUN SERPL-MCNC: 30 MG/DL (ref 6–20)
BUN/CREAT SERPL: 17 (ref 12–20)
CALCIUM SERPL-MCNC: 8.8 MG/DL (ref 8.5–10.1)
CHLORIDE SERPL-SCNC: 109 MMOL/L (ref 97–108)
CO2 SERPL-SCNC: 22 MMOL/L (ref 21–32)
CREAT SERPL-MCNC: 1.74 MG/DL (ref 0.55–1.02)
GLUCOSE SERPL-MCNC: 169 MG/DL (ref 65–100)
POTASSIUM SERPL-SCNC: 5.1 MMOL/L (ref 3.5–5.1)
SODIUM SERPL-SCNC: 142 MMOL/L (ref 136–145)

## 2022-10-13 PROCEDURE — 80048 BASIC METABOLIC PNL TOTAL CA: CPT

## 2022-10-13 PROCEDURE — 74011000258 HC RX REV CODE- 258: Performed by: SURGERY

## 2022-10-13 PROCEDURE — G0378 HOSPITAL OBSERVATION PER HR: HCPCS

## 2022-10-13 PROCEDURE — 65270000029 HC RM PRIVATE

## 2022-10-13 PROCEDURE — 74011250637 HC RX REV CODE- 250/637: Performed by: SURGERY

## 2022-10-13 PROCEDURE — 36415 COLL VENOUS BLD VENIPUNCTURE: CPT

## 2022-10-13 PROCEDURE — 74011000250 HC RX REV CODE- 250: Performed by: SURGERY

## 2022-10-13 PROCEDURE — 74011250636 HC RX REV CODE- 250/636: Performed by: SURGERY

## 2022-10-13 RX ADMIN — SODIUM CHLORIDE, PRESERVATIVE FREE 10 ML: 5 INJECTION INTRAVENOUS at 16:02

## 2022-10-13 RX ADMIN — AMLODIPINE BESYLATE 10 MG: 5 TABLET ORAL at 10:08

## 2022-10-13 RX ADMIN — POLYETHYLENE GLYCOL 3350 17 G: 17 POWDER, FOR SOLUTION ORAL at 10:08

## 2022-10-13 RX ADMIN — PIPERACILLIN AND TAZOBACTAM 3.38 G: 3; .375 INJECTION, POWDER, FOR SOLUTION INTRAVENOUS at 13:54

## 2022-10-13 RX ADMIN — ENOXAPARIN SODIUM 40 MG: 100 INJECTION SUBCUTANEOUS at 10:08

## 2022-10-13 RX ADMIN — PIPERACILLIN AND TAZOBACTAM 3.38 G: 3; .375 INJECTION, POWDER, FOR SOLUTION INTRAVENOUS at 04:57

## 2022-10-13 RX ADMIN — METOPROLOL TARTRATE 25 MG: 25 TABLET, FILM COATED ORAL at 21:05

## 2022-10-13 RX ADMIN — METOPROLOL TARTRATE 25 MG: 25 TABLET, FILM COATED ORAL at 10:08

## 2022-10-13 RX ADMIN — SODIUM CHLORIDE, PRESERVATIVE FREE 10 ML: 5 INJECTION INTRAVENOUS at 10:08

## 2022-10-13 RX ADMIN — CLONIDINE HYDROCHLORIDE 0.2 MG: 0.1 TABLET ORAL at 10:08

## 2022-10-13 RX ADMIN — PIPERACILLIN AND TAZOBACTAM 3.38 G: 3; .375 INJECTION, POWDER, FOR SOLUTION INTRAVENOUS at 21:05

## 2022-10-13 NOTE — PROGRESS NOTES
CM met with pt and her son at bedside to discuss hospital bed. Son in agreement. CM spoke with pt's family member Ms. Alex Jefferson about [facility] at d/c. Ms. Alex Jefferson stated pt is open with [facility]. CM will send ROBIN order. CM acknowledged consult. CM will send referral for a hospital bed and air mattress.      Archie Buenrostro, MSW  Care Management, [facility]

## 2022-10-13 NOTE — ANESTHESIA POSTPROCEDURE EVALUATION
Procedure(s):  INCISION AND DRAINAGE OF SACRUM, DEBRIDEMENT WITH MISONIX.    general    Anesthesia Post Evaluation      Multimodal analgesia: multimodal analgesia used between 6 hours prior to anesthesia start to PACU discharge  Patient location during evaluation: PACU  Patient participation: complete - patient participated  Level of consciousness: sleepy but conscious  Pain management: adequate  Airway patency: patent  Anesthetic complications: no  Cardiovascular status: acceptable, blood pressure returned to baseline and hemodynamically stable  Respiratory status: acceptable and nasal cannula  Hydration status: acceptable  Post anesthesia nausea and vomiting:  none  Final Post Anesthesia Temperature Assessment:  Normothermia (36.0-37.5 degrees C)      INITIAL Post-op Vital signs:   Vitals Value Taken Time   /62 10/12/22 2030   Temp 36.9 °C (98.5 °F) 10/12/22 2030   Pulse 73 10/12/22 2032   Resp 19 10/12/22 2032   SpO2 99 % 10/12/22 2032   Vitals shown include unvalidated device data.

## 2022-10-13 NOTE — PROGRESS NOTES
Surgery NP Progress Note    Dayana Situ  [id number]  female  76 y.o.  1945    s/p INCISION AND DRAINAGE OF SACRUM, DEBRIDEMENT WITH Mohan Diaz on 10/12/2022   Pt seen with Dr. Kevin Thurman    Assessment:   Active Problems:    Sacral wound (10/12/2022)        Expected post-op progress. Plan/Recommendations/Medical Decision Making:     - Mobilize with nursing and OOB to chair for meals  - Continue diet  - Pain management- Continue current pain control methods.   - VTE Prophylaxis: Lovenox   - Needs a support surface at home  - Wound care to assess for home wound care plan. Subjective:     Patient feeling ok this morning. No complaints. Objective:     Blood pressure (!) 101/58, pulse 66, temperature 98.9 °F (37.2 °C), resp. rate 18, height 5' 6\" (1.676 m), weight 62.4 kg (137 lb 9.1 oz), SpO2 100 %, not currently breastfeeding. Temp (24hrs), Av.6 °F (37 °C), Min:97.9 °F (36.6 °C), Max:99.7 °F (37.6 °C)      Pt resting in bed. NAD    SCDs for mechanical DVT proph while in bed     Body mass index is 22.2 kg/m². Reference: BMI greater than 30 is classified as obesity and greater than 40 is classified as morbid obesity.      Last 3 Recorded Weights in this Encounter    10/12/22 1541   Weight: 62.4 kg (137 lb 9.1 oz)         Jacqueline Martins, BONG   MSN, APRN, FNP-C, CWOCN-AP, RNFA    10/13/22

## 2022-10-13 NOTE — PERIOP NOTES
TRANSFER - OUT REPORT:    Verbal report given to [address] (name) on Shane Hendricks  being transferred to 3208(unit) for routine post - op       Report consisted of patients Situation, Background, Assessment and   Recommendations(SBAR). Information from the following report(s) OR Summary, Intake/Output, MAR, and Cardiac Rhythm SR  was reviewed with the receiving nurse. Opportunity for questions and clarification was provided.       Patient transported with RN

## 2022-10-13 NOTE — PROGRESS NOTES
Scr increased to 1.74  CrCl ~25 ml/min  Zosyn changed to 3.375 gm IV q8h (extended interval dosing)  Per protocol  Madison Harrington, PHARMD

## 2022-10-13 NOTE — PROGRESS NOTES
End of Shift Note    Bedside shift change report given to Maria E Henderson RN(oncoming nurse) by Bryce Reveles (offgoing nurse). Report included the following information SBAR, Kardex, Intake/Output, and MAR    Shift worked:  07-7p     Shift summary and any significant changes:    Family present   Room air   Somewhat confused   Pulled IV out   Replaced with new one and continued IV med fluids   Tolerated diet   Tolerated medication    Concerns for physician to address: None    Zone phone for oncoming shift:  None        Activity:  Activity Level: Bed Rest  Number times ambulated in hallways past shift: 0  Number of times OOB to chair past shift: 0    Cardiac:   Cardiac Monitoring: No      Cardiac Rhythm: Sinus Arrhythmia, 2\" AV Block Type I    Access:  Current line(s): PIV     Genitourinary:   Urinary status: voiding    Respiratory:   O2 Device: None (Room air)  Chronic home O2 use?: NO  Incentive spirometer at bedside: NO       GI:     Current diet:  ADULT DIET Regular; 3 carb choices (45 gm/meal); Low Sodium (2 gm)  Passing flatus: YES  Tolerating current diet: YES       Pain Management:   Patient states pain is manageable on current regimen: YES    Skin:  Darien Score: 16  Interventions: turn team, increase time out of bed, and foam dressing    Patient Safety:  Fall Score:  Total Score: 3  Interventions: bed/chair alarm, gripper socks, and stay with me (per policy)  High Fall Risk: Yes    Length of Stay:  Expected LOS: 4d 2h  Actual LOS: [address]

## 2022-10-13 NOTE — PROGRESS NOTES
End of Shift Note    Bedside shift change report given to Rosalba Parnell (oncoming nurse) by Latisha Murray RN (offgoing nurse). Report included the following information SBAR, Kardex, MAR, Accordion, Cardiac Rhythm resting, and Alarm Parameters     Shift worked:  [id number]     Shift summary and any significant changes:     Patient had no complaints of pain this shift; patient rested this shift. Post-op vitals completed this shift. AM labs completed. Concerns for physician to address:  Patient's BP's were on the lower side this shift.      Zone phone for oncoming shift:              Latisha Murray RN

## 2022-10-13 NOTE — PROGRESS NOTES
Problem: Falls - Risk of  Goal: *Absence of Falls  Description: Document Clearence Skates Fall Risk and appropriate interventions in the flowsheet. Outcome: Progressing Towards Goal  Note: Fall Risk Interventions:       Mentation Interventions: Bed/chair exit alarm    Medication Interventions: Bed/chair exit alarm    Elimination Interventions: Call light in reach              Problem: Patient Education: Go to Patient Education Activity  Goal: Patient/Family Education  Outcome: Progressing Towards Goal     Problem: Pressure Injury - Risk of  Goal: *Prevention of pressure injury  Description: Document Darien Scale and appropriate interventions in the flowsheet.   Outcome: Progressing Towards Goal  Note: Pressure Injury Interventions:  Sensory Interventions: Keep linens dry and wrinkle-free, Float heels    Moisture Interventions: Minimize layers    Activity Interventions: Increase time out of bed    Mobility Interventions: HOB 30 degrees or less    Nutrition Interventions: Document food/fluid/supplement intake                     Problem: Patient Education: Go to Patient Education Activity  Goal: Patient/Family Education  Outcome: Progressing Towards Goal

## 2022-10-13 NOTE — OP NOTES
[address]  OPERATIVE REPORT    Name:  Jess Cruz  MR#:  [id number]  :  1945  ACCOUNT #:  [de-identified]  DATE OF SERVICE:  10/12/2022      PREOPERATIVE DIAGNOSIS:  Sacral deep tissue injury. POSTOPERATIVE DIAGNOSIS:  Sacral deep tissue injury. PROCEDURE PERFORMED:  Sharp debridement with the scalpel of necrotic skin, adipose and muscle over the sacrum and further debridement with the Misonix device. SURGEON:  Faith Gutierrez MD    ASSISTANT:  Staff. ANESTHESIA:  General.    COMPLICATIONS:  None immediate. SPECIMENS REMOVED:  Cultures of the sacral wound. IMPLANTS:  None. ESTIMATED BLOOD LOSS:  Less than 50 mL. DRAINS:  None. FINDINGS:  There was approximately 100 cm2 of necrotic skin and adipose, and underlying muscle. DESCRIPTION OF PROCEDURE:  After obtaining informed consent, the patient was taken to the operating room and placed supine on the operating table. An operative time-out was performed and general endotracheal anesthesia was induced. Preoperative antibiotics were administered and the patient was then rolled prone with appropriate padding and securing straps. The sacrum and buttocks were prepped and draped in the usual sterile fashion. A blade was used to excise the grossly dead skin, adipose, and muscle. Cultures had been taken prior to doing this. The Misonix device was then used to further debride the necrotic tissues leaving behind healthier tissues. Minimal electrocautery was used to control a few small bleeders. Excellent hemostasis was noted at the end. The wound was then irrigated with antibiotic solution and then packed with Dakin's Kerlix. ABDs were applied and tape was used to hold them in place. The patient was recovered from general anesthesia and taken to recovery area in satisfactory condition. All instrument, sponge, and needle counts were reported as correct.         [address] [place] [address] MD [id number]  D:  10/12/2022 19:37  T:  10/12/2022 23:35  JOB #:  [id number]  CC:  BONG Saenz MD

## 2022-10-14 LAB
ANION GAP SERPL CALC-SCNC: 7 MMOL/L (ref 5–15)
BACTERIA SPEC CULT: ABNORMAL
BACTERIA SPEC CULT: ABNORMAL
BUN SERPL-MCNC: 27 MG/DL (ref 6–20)
BUN/CREAT SERPL: 17 (ref 12–20)
CALCIUM SERPL-MCNC: 8.6 MG/DL (ref 8.5–10.1)
CHLORIDE SERPL-SCNC: 111 MMOL/L (ref 97–108)
CO2 SERPL-SCNC: 25 MMOL/L (ref 21–32)
CREAT SERPL-MCNC: 1.6 MG/DL (ref 0.55–1.02)
GLUCOSE SERPL-MCNC: 146 MG/DL (ref 65–100)
POTASSIUM SERPL-SCNC: 3.5 MMOL/L (ref 3.5–5.1)
SERVICE CMNT-IMP: ABNORMAL
SODIUM SERPL-SCNC: 143 MMOL/L (ref 136–145)

## 2022-10-14 PROCEDURE — 65270000029 HC RM PRIVATE

## 2022-10-14 PROCEDURE — 80048 BASIC METABOLIC PNL TOTAL CA: CPT

## 2022-10-14 PROCEDURE — 74011000258 HC RX REV CODE- 258: Performed by: SURGERY

## 2022-10-14 PROCEDURE — 74011250637 HC RX REV CODE- 250/637: Performed by: SURGERY

## 2022-10-14 PROCEDURE — 74011000250 HC RX REV CODE- 250: Performed by: SURGERY

## 2022-10-14 PROCEDURE — 74011250636 HC RX REV CODE- 250/636: Performed by: SURGERY

## 2022-10-14 PROCEDURE — 36415 COLL VENOUS BLD VENIPUNCTURE: CPT

## 2022-10-14 RX ORDER — BALSAM PERU/CASTOR OIL
OINTMENT (GRAM) TOPICAL 2 TIMES DAILY
Status: DISCONTINUED | OUTPATIENT
Start: 2022-10-14 | End: 2022-10-18 | Stop reason: HOSPADM

## 2022-10-14 RX ADMIN — SODIUM CHLORIDE 50 ML/HR: 9 INJECTION, SOLUTION INTRAVENOUS at 04:31

## 2022-10-14 RX ADMIN — METOPROLOL TARTRATE 25 MG: 25 TABLET, FILM COATED ORAL at 08:50

## 2022-10-14 RX ADMIN — METOPROLOL TARTRATE 25 MG: 25 TABLET, FILM COATED ORAL at 23:29

## 2022-10-14 RX ADMIN — CASTOR OIL AND BALSAM, PERU: 788; 87 OINTMENT TOPICAL at 13:05

## 2022-10-14 RX ADMIN — PIPERACILLIN AND TAZOBACTAM 3.38 G: 3; .375 INJECTION, POWDER, FOR SOLUTION INTRAVENOUS at 04:31

## 2022-10-14 RX ADMIN — CLONIDINE HYDROCHLORIDE 0.2 MG: 0.1 TABLET ORAL at 08:50

## 2022-10-14 RX ADMIN — SODIUM CHLORIDE, PRESERVATIVE FREE 10 ML: 5 INJECTION INTRAVENOUS at 00:37

## 2022-10-14 RX ADMIN — AMLODIPINE BESYLATE 10 MG: 5 TABLET ORAL at 08:50

## 2022-10-14 RX ADMIN — CASTOR OIL AND BALSAM, PERU: 788; 87 OINTMENT TOPICAL at 23:31

## 2022-10-14 RX ADMIN — POLYETHYLENE GLYCOL 3350 17 G: 17 POWDER, FOR SOLUTION ORAL at 08:58

## 2022-10-14 RX ADMIN — ENOXAPARIN SODIUM 40 MG: 100 INJECTION SUBCUTANEOUS at 08:50

## 2022-10-14 RX ADMIN — HYDROMORPHONE HYDROCHLORIDE 0.5 MG: 1 INJECTION, SOLUTION INTRAMUSCULAR; INTRAVENOUS; SUBCUTANEOUS at 09:41

## 2022-10-14 RX ADMIN — SODIUM CHLORIDE, PRESERVATIVE FREE 10 ML: 5 INJECTION INTRAVENOUS at 07:57

## 2022-10-14 NOTE — PROGRESS NOTES
End of Shift Note    Bedside shift change report given to [facility] RN(oncoming nurse) by Sunil Rothman (offgoing nurse). Report included the following information SBAR, Kardex, Intake/Output, and MAR    Shift worked:  07-7p     Shift summary and any significant changes:    Family present   Room air   Somewhat confused   Pulled IV out   Replaced with new one and continued IV med fluids   Tolerated diet   Tolerated medication   No Iv access now due to patient pulling out. MD Dc'd switched to oral meds  Wound care completed BID   Speciality bed in use      Concerns for physician to address: None    Zone phone for oncoming shift:  None        Activity:  Activity Level: Bed Rest  Number times ambulated in hallways past shift: 0  Number of times OOB to chair past shift: 0    Cardiac:   Cardiac Monitoring: No      Cardiac Rhythm: Sinus Arrhythmia, 2\" AV Block Type I    Access:  Current line(s): PIV     Genitourinary:   Urinary status: voiding    Respiratory:   O2 Device: None (Room air)  Chronic home O2 use?: NO  Incentive spirometer at bedside: NO       GI:     Current diet:  ADULT DIET Regular; 3 carb choices (45 gm/meal); Low Sodium (2 gm)  ADULT ORAL NUTRITION SUPPLEMENT Lunch, Dinner; Frozen Supplement  DIET ONE TIME MESSAGE  Passing flatus: YES  Tolerating current diet: YES       Pain Management:   Patient states pain is manageable on current regimen: YES    Skin:  Darien Score: 16  Interventions: turn team, increase time out of bed, and foam dressing    Patient Safety:  Fall Score:  Total Score: 3  Interventions: bed/chair alarm, gripper socks, and stay with me (per policy)  High Fall Risk: Yes    Length of Stay:  Expected LOS: 4d 2h  Actual LOS: [facility]

## 2022-10-14 NOTE — PROGRESS NOTES
Comprehensive Nutrition Assessment    Type and Reason for Visit: Initial, Wound    Nutrition Recommendations/Plan:   Continue diet as tolerated  RD to add Magic Cup  BID (will monitor BG)   Encourage PO intake  Obtain bed scale wt      Malnutrition Assessment:  Malnutrition Status: Moderate malnutrition (10/14/22 1525)    Context:  Acute illness     Findings of the 6 clinical characteristics of malnutrition:   Energy Intake:  50% or less of est energy requirements for 5 or more days  Weight Loss:  Unable to assess     Body Fat Loss:  No significant body fat loss,     Muscle Mass Loss:  Mild muscle mass loss, Clavicles (pectoralis & deltoids)  Fluid Accumulation:  Mild, Extremities   Strength:  Not performed     Nutrition Assessment:  Pt admitted with sacral wound. PMH: CKD, CHF, HTN, DM. Chart reviewed for PU referral, pt lying in bed awake and alert with some confusion per RN. Family at the bedside endorses wt loss but is unable to report a UBW. They lift her at home and one family member reports she does not feel particularly lighter than normal.  They do report a decline in her appetite, she is consuming ~50% of what is normal for her over the past month. Pt does not like traditional supplements (milkshakes) but is agreeable to try magic cup. Given DM hx will need to monitor for hyperglycemia (has not been a problem as of yet). Wt hx is very variable per EMR, will request a new bedscale wt as there is no source for current wt.    Patient Vitals for the past 168 hrs:   % Diet Eaten   10/13/22 1354 26 - 50%   10/13/22 0830 26 - 50%     Wt Readings from Last 15 Encounters:   10/12/22 62.4 kg (137 lb 9.1 oz)   03/07/22 76.2 kg (168 lb)   02/09/22 74.4 kg (164 lb)   11/08/21 71.6 kg (157 lb 12.8 oz)   10/05/21 74.4 kg (164 lb)   08/10/21 69.4 kg (153 lb)   05/18/21 64 kg (141 lb)   05/05/21 60.8 kg (134 lb)   04/29/21 61.7 kg (136 lb)   02/03/21 60.8 kg (134 lb)   01/28/21 63.5 kg (140 lb)   12/02/20 52.2 kg (115 lb)            Nutrition Related Findings:    Meds: miralax. Edema: +2-BLE. BM PTA Wound Type: Stage IV (sacrum), Deep tissue injury (L heel and R foot)     Current Nutrition Intake & Therapies:  Average Meal Intake: 26-50%  Average Supplement Intake: None ordered  ADULT DIET Regular; 3 carb choices (45 gm/meal); Low Sodium (2 gm)  ADULT ORAL NUTRITION SUPPLEMENT Lunch, Dinner; Frozen Supplement  DIET ONE TIME MESSAGE    Anthropometric Measures:  Height: 5' 6\" (167.6 cm)  Ideal Body Weight (IBW): 130 lbs (59 kg)     Current Body Wt:  62.4 kg (137 lb 9.1 oz), 105.8 % IBW. Not specified  Current BMI (kg/m2): 22.2                          BMI Category: Normal weight (BMI 18.5-24. 9)    Estimated Daily Nutrient Needs:  Energy Requirements Based On: Formula  Weight Used for Energy Requirements: Current  Energy (kcal/day): MSJ 1500 (1116 x 1.3)  Weight Used for Protein Requirements: Current  Protein (g/day): 87g (1.4gPro/kg)  Method Used for Fluid Requirements: 1 ml/kcal  Fluid (ml/day): 1500mL or per MD    Nutrition Diagnosis:   Inadequate protein-energy intake related to increased demand for energy/nutrients (poor appetite) as evidenced by weight loss, mild muscle loss    Nutrition Interventions:   Food and/or Nutrient Delivery: Continue current diet, Start oral nutrition supplement  Nutrition Education/Counseling: No recommendations at this time  Coordination of Nutrition Care: Continue to monitor while inpatient       Goals:     Goals: PO intake 50% or greater, by next RD assessment       Nutrition Monitoring and Evaluation:   Behavioral-Environmental Outcomes: None identified  Food/Nutrient Intake Outcomes: Food and nutrient intake, Supplement intake  Physical Signs/Symptoms Outcomes: Biochemical data, Nutrition focused physical findings, Skin, Weight    Discharge Planning:    Continue oral nutrition supplement, Continue current diet    Adalid Almonte RD, CNSC  Contact: ext [phone]

## 2022-10-14 NOTE — WOUND CARE
Wound care Nurse consult: consult placed for sacral wound and bilateral heels. Patient is a 75 y/o AAF  admitted through OR 10/12/22 for I&D of sacral wound. Patient was referred from [facility] MD to Dr Pooja Rosario for procedure. POD# 2 from I&D of sacral  Past Medical History:   Diagnosis Date    Cardiomyopathy ([facility] 12/03/2020    Cellulitis     Chronic kidney disease     HFrEF (heart failure with reduced ejection fraction) ([facility] 12/03/2020    Hypertension, uncontrolled 12/03/2020    Mobitz type 2 second degree atrioventricular block 80/82/8314    Systolic heart failure ([facility] 12/03/2020   Patient is 5'6\", 137 lbs  Incontinent of Urine and Stool    Patient very uncomforatble and NOW order of pain medicine ordered for wound care. Urine soaked dressing removed and staff nurse stated the dressing has been changed 2-3 times due to incontinence getting into dressing. Surgical NP and WC nurses assessed sacral wound and recommend Santyl ointment with NS moist to dry dressing changes due to amount of devitalized tissue still in wound:    Bone is palpable   Stage 4 Pressure Injury  Left Heel DTPI:    Right foot, lateral, 5th metatarsal    WOUND POA CONDITIONS    Wound Toe (Comment  which one) Anterior; Left 12/01/20 (Active)   Number of days: 682       Wound Anterior;Right (Active)   Number of days: 682       Wound Sacrum # 1 10/10/22 (Active)   Wound Image   10/14/22 1126   Wound Etiology Pressure Stage 4 10/14/22 1126   Dressing Status New dressing applied 10/14/22 1126   Cleansed Cleansed with saline 10/14/22 1126   Dressing/Treatment Moist to dry 10/14/22 1126   Dressing Change Due 10/15/22 10/14/22 1126   Wound Length (cm) 6 cm 10/14/22 1126   Wound Width (cm) 9 cm 10/14/22 1126   Wound Depth (cm) 3 cm 10/14/22 1126   Wound Surface Area (cm^2) 54 cm^2 10/14/22 1126   Change in Wound Size % 47.06 10/14/22 1126   Wound Volume (cm^3) 162 cm^3 10/14/22 1126   Wound Healing % 39 10/14/22 1126   Wound Assessment Exposed Structure Fascia; Devitalized tissue;Pale granulation tissue 10/14/22 1126   Drainage Amount Small 10/14/22 1126   Drainage Description Serosanguinous 10/14/22 1126   Wound Odor None 10/14/22 1126   Juana-Wound/Incision Assessment Intact 10/14/22 1126   Edges Defined edges 10/14/22 1126   Wound Thickness Description Full thickness 10/14/22 1126   Number of days: 4       Wound Heel Left 10/14/22 (Active)   Wound Image   10/14/22 1130   Wound Etiology Deep Tissue/Injury 10/14/22 1130   Dressing/Treatment Pharmaceutical agent (see MAR) 10/14/22 1130   Wound Length (cm) 3 cm 10/14/22 1130   Wound Width (cm) 3 cm 10/14/22 1130   Wound Surface Area (cm^2) 9 cm^2 10/14/22 1130   Wound Assessment Purple/maroon 10/14/22 1130   Drainage Amount None 10/14/22 1130   Wound Odor None 10/14/22 1130   Juana-Wound/Incision Assessment Dry/flaky 10/14/22 1130   Edges Undefined edges 10/14/22 1130   Number of days: 0       Wound Foot Right;Lateral 10/14/22 (Active)   Wound Image   10/14/22 1130   Wound Etiology Deep Tissue/Injury 10/14/22 1130   Dressing/Treatment Pharmaceutical agent (see MAR) 10/14/22 1130   Wound Length (cm) 1 cm 10/14/22 1130   Wound Width (cm) 1 cm 10/14/22 1130   Wound Surface Area (cm^2) 1 cm^2 10/14/22 1130   Wound Assessment Purple/maroon 10/14/22 1130   Drainage Amount None 10/14/22 1130   Wound Odor None 10/14/22 1130   Juana-Wound/Incision Assessment Intact 10/14/22 1130   Edges Undefined edges 10/14/22 1130   Number of days: 0       Incision 10/12/22 Sacrum (Active)   Dressing Status Clean;Dry; Intact 10/13/22 2028   Cleansed Not cleansed 10/13/22 0430   Dressing/Treatment Other (Comment);ABD pad;Tape/Soft cloth adhesive tape 10/12/22 1930   Drainage Amount None 10/13/22 1731   Number of days: 2       Recommend: Groves catheter - urine soaked dressing.   Specialty bed - Low air loss mattress for stage 4 sacral PI    Sacral wound: daily, cleanse with NS moist 4x4's, apply Santyl ointment to wound base and pack wound with NS moist Kerlix/Bulkee II rolled gauze, cover with 4x4's, Optilock/ABD pads secured with tape. Left heel and right lateral 5th metatarsal DTPI\"s; cleanse with soap and water as needed and apply Venelex ointment BID. Heel suspension boots. Plan: NP and  setting up [facility] and specialty bed/mattress for home use.     Jessie Pastrana RN, [employer]

## 2022-10-14 NOTE — PROGRESS NOTES
Problem: Falls - Risk of  Goal: *Absence of Falls  Description: Document Clearence Skates Fall Risk and appropriate interventions in the flowsheet. Outcome: Progressing Towards Goal  Note: Fall Risk Interventions:  Mobility Interventions: Patient to call before getting OOB, Bed/chair exit alarm    Mentation Interventions: Bed/chair exit alarm    Medication Interventions: Bed/chair exit alarm    Elimination Interventions: Call light in reach              Problem: Patient Education: Go to Patient Education Activity  Goal: Patient/Family Education  Outcome: Progressing Towards Goal     Problem: Pressure Injury - Risk of  Goal: *Prevention of pressure injury  Description: Document Darien Scale and appropriate interventions in the flowsheet.   Outcome: Progressing Towards Goal  Note: Pressure Injury Interventions:  Sensory Interventions: Keep linens dry and wrinkle-free, Float heels    Moisture Interventions: Minimize layers    Activity Interventions: Increase time out of bed    Mobility Interventions: HOB 30 degrees or less    Nutrition Interventions: Document food/fluid/supplement intake    Friction and Shear Interventions: HOB 30 degrees or less, Feet elevated on foot rest                Problem: Patient Education: Go to Patient Education Activity  Goal: Patient/Family Education  Outcome: Progressing Towards Goal

## 2022-10-14 NOTE — PROGRESS NOTES
End of Shift Note    Bedside shift change report given to Dominic Carr LPN (oncoming nurse) by Leonardo Enrique RN (offgoing nurse).   Report included the following information SBAR, Kardex, Intake/Output, and MAR    Shift worked:  7p-7a     Shift summary and any significant changes:    Ongoing confusion, easily oriented, pulled out IV,  antibiotic /ivf therapy ongoing, dressing changed completed this am     Concerns for physician to address:       Zone phone for oncoming shift:            Length of Stay:  Expected LOS: 4d 2h  Actual LOS: 1      Leonardo Enrique, RN

## 2022-10-14 NOTE — PROGRESS NOTES
Problem: Falls - Risk of  Goal: *Absence of Falls  Description: Document Lettie Duverney Fall Risk and appropriate interventions in the flowsheet. Outcome: Progressing Towards Goal  Note: Fall Risk Interventions:  Mobility Interventions: Patient to call before getting OOB, Bed/chair exit alarm    Mentation Interventions: Bed/chair exit alarm    Medication Interventions: Bed/chair exit alarm    Elimination Interventions: Call light in reach              Problem: Pressure Injury - Risk of  Goal: *Prevention of pressure injury  Description: Document Darien Scale and appropriate interventions in the flowsheet.   Outcome: Progressing Towards Goal  Note: Pressure Injury Interventions:  Sensory Interventions: Keep linens dry and wrinkle-free, Float heels    Moisture Interventions: Minimize layers    Activity Interventions: Increase time out of bed    Mobility Interventions: HOB 30 degrees or less    Nutrition Interventions: Document food/fluid/supplement intake    Friction and Shear Interventions: HOB 30 degrees or less, Feet elevated on foot rest

## 2022-10-14 NOTE — PROGRESS NOTES
11: 02 a.m. Order for vashti lift sent. CM attended IDR. Pt needs a vashti lift. CM will order through [place].   [facility] accepted pt for [place] at d/c.      Rashaad Bedolla, MSW  Care Management, [facility]

## 2022-10-14 NOTE — PROGRESS NOTES
Surgery NP Progress Note    Yelitza Colon  [id number]  female  76 y.o.  1945    s/p INCISION AND DRAINAGE OF SACRUM, DEBRIDEMENT WITH Darryle Glencliff on 10/12/2022     Assessment:   Active Problems:    Sacral wound (10/12/2022)      Expected post-op progress. Plan/Recommendations/Medical Decision Making:     - Mobilize with nursing and OOB to chair for meals  - Continue diet  - Pain management- Continue current pain control methods.   - VTE Prophylaxis: Lovenox   - Needs a support surface at home and a vashti for appointments. - Discharge planning with daily dressing changes. - Groves due to incontinence in the setting of stage 4 sacral wound    Subjective:     Patient having pain with dressing change. Otherwise no complaints. Objective:     Blood pressure 138/73, pulse 77, temperature 97.7 °F (36.5 °C), resp. rate 16, height 5' 6\" (1.676 m), weight 62.4 kg (137 lb 9.1 oz), SpO2 99 %, not currently breastfeeding. Temp (24hrs), Av.9 °F (36.6 °C), Min:97.6 °F (36.4 °C), Max:98.3 °F (36.8 °C)      Pt resting in bed. NAD    SCDs for mechanical DVT proph while in bed   Wound assessed with wound care today and noted to have adherent slough with some pink tissue. Bone palpable. Some evidence of devitalized tissue on wound edges. Body mass index is 22.2 kg/m². Reference: BMI greater than 30 is classified as obesity and greater than 40 is classified as morbid obesity.      Last 3 Recorded Weights in this Encounter    10/12/22 1541   Weight: 62.4 kg (137 lb 9.1 oz)         Nereyda Sterling NP   MSN, APRN, FNP-C, CWOCN-AP, RNFA    10/14/22

## 2022-10-15 PROCEDURE — 74011250636 HC RX REV CODE- 250/636: Performed by: SURGERY

## 2022-10-15 PROCEDURE — 74011250637 HC RX REV CODE- 250/637: Performed by: SURGERY

## 2022-10-15 PROCEDURE — 65270000029 HC RM PRIVATE

## 2022-10-15 PROCEDURE — 74011000250 HC RX REV CODE- 250: Performed by: SURGERY

## 2022-10-15 RX ADMIN — AMLODIPINE BESYLATE 10 MG: 5 TABLET ORAL at 15:33

## 2022-10-15 RX ADMIN — CASTOR OIL AND BALSAM, PERU: 788; 87 OINTMENT TOPICAL at 09:00

## 2022-10-15 RX ADMIN — METOPROLOL TARTRATE 25 MG: 25 TABLET, FILM COATED ORAL at 22:27

## 2022-10-15 RX ADMIN — ENOXAPARIN SODIUM 40 MG: 100 INJECTION SUBCUTANEOUS at 10:07

## 2022-10-15 RX ADMIN — CASTOR OIL AND BALSAM, PERU: 788; 87 OINTMENT TOPICAL at 22:27

## 2022-10-15 RX ADMIN — CLONIDINE HYDROCHLORIDE 0.2 MG: 0.1 TABLET ORAL at 15:34

## 2022-10-15 RX ADMIN — COLLAGENASE SANTYL: 250 OINTMENT TOPICAL at 09:00

## 2022-10-15 NOTE — PROGRESS NOTES
End of Shift Note    Bedside shift change report given to [facility], RN (oncoming nurse) by Tom Patel RN (offgoing nurse).   Report included the following information SBAR, Kardex, Procedure Summary, Intake/Output, MAR, and Recent Results    Shift worked:  7p-7a     Shift summary and any significant changes:    Patient rested this shift  Patient had no complaints of pain this shift      Concerns for physician to address:  None     Zone phone for oncoming shift:             Tom Patel RN

## 2022-10-15 NOTE — PROGRESS NOTES
Surgery Progress Note    Aspen Oliveira  [id number]  female  76 y.o.  1945    s/p INCISION AND DRAINAGE OF SACRUM, DEBRIDEMENT WITH Regi Persons on 10/12/2022     Assessment:   Active Problems:    Sacral wound (10/12/2022)      Expected post-op progress. Plan/Recommendations/Medical Decision Making:     - Mobilize with nursing and OOB to chair for meals  - Continue diet  - Pain management- Continue current pain control methods.   - VTE Prophylaxis: Lovenox   - Needs a support surface at home and a vashti for appointments. Discharge home once these items are delivered  - Discharge planning with daily dressing changes. - Groves due to incontinence in the setting of stage 4 sacral wound    Subjective:     Patient having pain with dressing change. Otherwise no complaints. Objective:     Blood pressure 125/70, pulse 69, temperature 99.1 °F (37.3 °C), resp. rate 18, height 5' 6\" (1.676 m), weight 137 lb 9.1 oz (62.4 kg), SpO2 100 %, not currently breastfeeding. Temp (24hrs), Av.9 °F (36.6 °C), Min:96.9 °F (36.1 °C), Max:99.1 °F (37.3 °C)      Pt resting in bed. NAD    SCDs for mechanical DVT proph while in bed   Wound assessed with nursing today and noted to have adherent slough with some pink tissue. Bone palpable. Some evidence of devitalized tissue on wound edges. Body mass index is 22.2 kg/m².      Last 3 Recorded Weights in this Encounter    10/12/22 4291   Weight: 137 lb 9.1 oz (62.4 kg)         Sierra Steward MD   [facility]    10/15/22

## 2022-10-15 NOTE — PROGRESS NOTES
End of Shift Note    Bedside shift change report given to Gus Castillo (oncoming nurse) by Mayuri Rene RN (offgoing nurse).   Report included the following information SBAR, Kardex, Intake/Output, MAR, and Recent Results    Shift worked:  7a-7p     Shift summary and any significant changes:     Large loose BM this AM, sacral dressing changed, resting quietly in bed, became agitated trying to leave when her family left in evening     Concerns for physician to address:  Bed estimated to be delivered to house on Monday     Zone phone for oncoming shift:   [phone]

## 2022-10-16 LAB
BACTERIA SPEC CULT: ABNORMAL
GRAM STN SPEC: ABNORMAL
SERVICE CMNT-IMP: ABNORMAL

## 2022-10-16 PROCEDURE — 65270000029 HC RM PRIVATE

## 2022-10-16 PROCEDURE — 74011250636 HC RX REV CODE- 250/636: Performed by: SURGERY

## 2022-10-16 PROCEDURE — 74011250637 HC RX REV CODE- 250/637: Performed by: SURGERY

## 2022-10-16 RX ADMIN — AMLODIPINE BESYLATE 10 MG: 5 TABLET ORAL at 09:38

## 2022-10-16 RX ADMIN — METOPROLOL TARTRATE 25 MG: 25 TABLET, FILM COATED ORAL at 09:39

## 2022-10-16 RX ADMIN — METOPROLOL TARTRATE 25 MG: 25 TABLET, FILM COATED ORAL at 21:56

## 2022-10-16 RX ADMIN — ENOXAPARIN SODIUM 40 MG: 100 INJECTION SUBCUTANEOUS at 09:39

## 2022-10-16 RX ADMIN — CLONIDINE HYDROCHLORIDE 0.2 MG: 0.1 TABLET ORAL at 09:38

## 2022-10-16 RX ADMIN — CASTOR OIL AND BALSAM, PERU: 788; 87 OINTMENT TOPICAL at 21:58

## 2022-10-16 RX ADMIN — CASTOR OIL AND BALSAM, PERU: 788; 87 OINTMENT TOPICAL at 09:46

## 2022-10-16 NOTE — PROGRESS NOTES
Surgery Progress Note    Jessica Thurman  [id number]  female  76 y.o.  1945    s/p INCISION AND DRAINAGE OF SACRUM, DEBRIDEMENT WITH Latonia Cease on 10/12/2022     Assessment:   Active Problems:    Sacral wound (10/12/2022)      Expected post-op progress. Plan/Recommendations/Medical Decision Making:     - Mobilize with nursing and OOB to chair for meals  - Continue diet  - Pain management- Continue current pain control methods.   - VTE Prophylaxis: Lovenox   - Needs a support surface at home and a vashti for appointments. Discharge home once these items are delivered - expected 10/17  - Discharge planning with daily dressing changes. - Groves due to incontinence in the setting of stage 4 sacral wound    Subjective:     Patient having pain with dressing change. Otherwise no complaints. Objective:     Blood pressure (!) 119/55, pulse 81, temperature 98.2 °F (36.8 °C), resp. rate 16, height 5' 6\" (1.676 m), weight 137 lb 9.1 oz (62.4 kg), SpO2 100 %, not currently breastfeeding. Temp (24hrs), Av.3 °F (36.8 °C), Min:98.2 °F (36.8 °C), Max:98.5 °F (36.9 °C)      Pt resting in bed. NAD    SCDs for mechanical DVT proph while in bed   Wound assessed with nursing yesterday and noted to have adherent slough with some pink tissue. Bone palpable. Some evidence of devitalized tissue on wound edges. Body mass index is 22.2 kg/m².      Last 3 Recorded Weights in this Encounter    10/12/22 6631   Weight: 137 lb 9.1 oz (62.4 kg)         Salty Schreiber MD   [facility]    10/16/22

## 2022-10-16 NOTE — PROGRESS NOTES
Transition of Care Plan:    RUR: 8  LALO: 10/17? Disposition: Plan Home once stable with ROBIN with [address]. 3:37 PM   CM completed chart review. [facility] is on standby. . CM sent update through [employer] to let them know Pt is still here and hoping to DC 10/17. CM sent message to [employer] to see if DME is delivered or get ETA on delivery through [employer]. CM will call them tomorrow if they do not respond. Follow up appointments: PCP: Bayron Carpenter:   DME needed: Needs [facility] [address] Bed and Low Air Loss Mattresss. - pending with [employer]  Transportation at Discharge: BLS transport will need to be arranged  Keys or means to access home:   family     IM Medicare Letter: 1st IM Letter delivered; 10/12  2nd IM Letter will need to be delivered prior to DC. Is patient a [place] and connected with the [place]?            no    If yes, was [place] transfer form completed and [facility] notified? Caregiver Contact:Berta Marinelli [phone]  Quinn Frederick:Son, [phone]  Discharge Caregiver contacted prior to discharge? No  Care Conference needed?:            no    CM will continue to monitor DC plan.      Thalia Jarrett, Chantal CM   Ext [phone]

## 2022-10-17 PROCEDURE — 74011250636 HC RX REV CODE- 250/636: Performed by: SURGERY

## 2022-10-17 PROCEDURE — 65270000029 HC RM PRIVATE

## 2022-10-17 PROCEDURE — 74011250637 HC RX REV CODE- 250/637: Performed by: SURGERY

## 2022-10-17 RX ORDER — POLYETHYLENE GLYCOL 3350 17 G/17G
17 POWDER, FOR SOLUTION ORAL DAILY
Qty: 15 PACKET | Refills: 0 | Status: SHIPPED | OUTPATIENT
Start: 2022-10-18 | End: 2022-11-02

## 2022-10-17 RX ORDER — ENOXAPARIN SODIUM 100 MG/ML
30 INJECTION SUBCUTANEOUS EVERY 24 HOURS
Status: DISCONTINUED | OUTPATIENT
Start: 2022-10-18 | End: 2022-10-18 | Stop reason: HOSPADM

## 2022-10-17 RX ADMIN — POLYETHYLENE GLYCOL 3350 17 G: 17 POWDER, FOR SOLUTION ORAL at 08:40

## 2022-10-17 RX ADMIN — COLLAGENASE SANTYL: 250 OINTMENT TOPICAL at 09:00

## 2022-10-17 RX ADMIN — METOPROLOL TARTRATE 25 MG: 25 TABLET, FILM COATED ORAL at 20:45

## 2022-10-17 RX ADMIN — CLONIDINE HYDROCHLORIDE 0.2 MG: 0.1 TABLET ORAL at 08:40

## 2022-10-17 RX ADMIN — CASTOR OIL AND BALSAM, PERU: 788; 87 OINTMENT TOPICAL at 20:47

## 2022-10-17 RX ADMIN — ENOXAPARIN SODIUM 40 MG: 100 INJECTION SUBCUTANEOUS at 08:40

## 2022-10-17 RX ADMIN — CASTOR OIL AND BALSAM, PERU: 788; 87 OINTMENT TOPICAL at 08:41

## 2022-10-17 RX ADMIN — AMLODIPINE BESYLATE 10 MG: 5 TABLET ORAL at 08:40

## 2022-10-17 RX ADMIN — METOPROLOL TARTRATE 25 MG: 25 TABLET, FILM COATED ORAL at 08:40

## 2022-10-17 NOTE — PROGRESS NOTES
End of Shift Note    Bedside shift change report given to [address] (oncoming nurse) by Kinza Booker RN (offgoing nurse). Report included the following information SBAR, Kardex, MAR, and Recent Results    Shift worked:  7p-7a     Shift summary and any significant changes:     Pt tolerated all meds and treatments. Pt turned and repositioned Q2HR  with 2 person assist tolerated well. Dressing intact to sacrum. No distress noted. Call bell in [facility] up. Waiting on bed to get to private home for home care so pt may be discharged. Concerns for physician to address:  none     Zone phone for oncoming shift:  [phone]         Activity:  Activity Level: Bed Rest  Number times ambulated in hallways past shift: 0  Number of times OOB to chair past shift: 0    Cardiac:   Cardiac Monitoring: No      Cardiac Rhythm: Sinus Rhythm    Access:  Current line(s): no access     Genitourinary:   Urinary status: garner    Respiratory:   O2 Device: None (Room air)  Chronic home O2 use?: NO  Incentive spirometer at bedside: NO       GI:  Last Bowel Movement Date: 10/16/22  Current diet:  ADULT DIET Regular; 3 carb choices (45 gm/meal); Low Sodium (2 gm)  ADULT ORAL NUTRITION SUPPLEMENT Lunch, Dinner; Frozen Supplement  DIET ONE TIME MESSAGE  Passing flatus: YES  Tolerating current diet: YES       Pain Management:   Patient states pain is manageable on current regimen: YES    Skin:  Darien Score: 17  Interventions: speciality bed, float heels, and foam dressing    Patient Safety:  Fall Score:  Total Score: 4  Interventions: bed/chair alarm  High Fall Risk: Yes    Length of Stay:  Expected LOS: 4d 2h  Actual LOS: [place], RN

## 2022-10-17 NOTE — DISCHARGE SUMMARY
Post- Surgical Discharge Summary    Patient ID:  Raman Fernandez  [id number]  female  76 y.o.  1945    Admit date: 10/12/2022    Discharge date: 10/17/22     Admitting Physician: Rustam Clarke MD     Consulting Physician(s):   Treatment Team: Attending Provider: Savannah Mckinley MD; Utilization Review: Raymond Martinez; Staff Nurse: Andrew Wright; Care Manager: Shanta Haley    Date of Surgery:   10/12/2022     Preoperative Diagnosis:  sacral decubitus wound    Postoperative Diagnosis:   sacral decubitus wound    Procedure(s):  INCISION AND DRAINAGE OF SACRUM, DEBRIDEMENT WITH [address]     Anesthesia Type:   General     Surgeon: Savannah Mckinley MD                            HPI:  Pt is a 76 y.o. female who has a history of sacral pressure wound who presents at this time for a debridement following the failure of conservative management.     Problem List:   Problem List as of 10/17/2022 Date Reviewed: 10/12/2022            Codes Class Noted - Resolved    Sacral wound ICD-10-CM: S31.000A  ICD-9-CM: 959.19  10/12/2022 - Present        Decubitus ulcer of sacral region, unstageable [facility]) ICD-10-CM: L89.150  ICD-9-CM: 707.03, 707.25  10/10/2022 - Present        Chronic renal disease, stage III ICD-10-CM: N18.30  ICD-9-CM: 585.3  9/22/2022 - Present        Type 2 diabetes mellitus with chronic kidney disease ([facility] ICD-10-CM: E11.22  ICD-9-CM: 250.40, 585.9  8/10/2021 - Present        Osteopenia of multiple sites ICD-10-CM: M85.89  ICD-9-CM: 733.90  5/11/2021 - Present        Urinary incontinence ICD-10-CM: R32  ICD-9-CM: 788.30  2/4/2021 - Present        Cardiomyopathy ([facility] ICD-10-CM: I42.9  ICD-9-CM: 425.4  12/3/2020 - Present        Systolic heart failure ([facility] ICD-10-CM: I50.20  ICD-9-CM: 428.20  12/3/2020 - Present        HFrEF (heart failure with reduced ejection fraction) ([facility] ICD-10-CM: I50.20  ICD-9-CM: 428.20  12/3/2020 - Present        Hypertension, uncontrolled ICD-10-CM: I10  ICD-9-CM: 401.9  12/3/2020 - Present        Mobitz type 2 second degree atrioventricular block ICD-10-CM: I44.1  ICD-9-CM: 426.12  12/3/2020 - Present        RESOLVED: Non-pressure chronic ulcer of right lower leg with fat layer exposed ([facility] ICD-10-CM: A83.464  ICD-9-CM: 707.10  2/4/2021 - 4/15/2021        RESOLVED: Pressure injury of sacral region, stage 4 (H[facility]) ICD-10-CM: P38.835  ICD-9-CM: 707.03, 707.24  2/4/2021 - 7/12/2021        RESOLVED: Cellulitis ICD-10-CM: L03.90  ICD-9-CM: 682.9  12/1/2020 - 2/4/2021            Hospital Course: The patient underwent surgery. Intra-operative complications: None; patient tolerated the procedure well. Was taken to the PACU in stable condition and then transferred to the surgical floor. Patient was kept inpatient while home DME was arranged. A garner was inserted due to constant urinary soiling of wound dressing. Perioperative Antibiotics: Cefazolin    Postoperative Pain Management:  Oxycodone    Postoperative transfusions:    Number of units banked PRBCs =   none     Post Op complications: None    Incisions  - clean, dry and intact. No significant erythema or swelling. Wound(s) appear to be healing without any evidence of infection. Patient mobilized with nursing and was found to be safe and steady with ambulation. Discharged to: Home with [facility] and home DME    Condition on Discharge: Stable     Discharge instructions:    - Take pain medications as prescribed  - Diet Regular  - Discharge activity:    - Activity as tolerated    - Ambulate several times a day   - No heavy lifting for 4 weeks   - Do not drive for two weeks or while on opioid pain medications  - Wound Care: Keep wound(s) clean and dry. See discharge instruction sheet. Allergies:     Allergies   Allergen Reactions    Seafood Hives    Drug Ingredient [Cetirizine] Unknown (comments)     Mistaken entry              -DISCHARGE MEDICATION LIST     Current Discharge Medication List        START taking these medications    Details   collagenase (SANTYL) 250 unit/gram ointment Apply 0.5 Tubes to affected area daily for 7 days. Indications: a skin ulcer  Qty: 15 g, Refills: 0  Start date: 10/18/2022, End date: 10/25/2022      polyethylene glycol (MIRALAX) 17 gram packet Take 1 Packet by mouth daily for 15 days. Qty: 15 Packet, Refills: 0  Start date: 10/18/2022, End date: 11/2/2022           CONTINUE these medications which have NOT CHANGED    Details   metoprolol tartrate (LOPRESSOR) 25 mg tablet Take 25 mg by mouth every twelve (12) hours. cloNIDine HCL (CATAPRES) 0.2 mg tablet TAKE ONE TABLET BY MOUTH THREE TIMES DAILY  Qty: 270 Tablet, Refills: 3    Comments: ZERO refills remain on this prescription. Your patient is requesting advance approval of refills for this medication to PREVENT ANY MISSED DOSES      amLODIPine (NORVASC) 10 mg tablet Take 1 Tablet by mouth daily. Qty: 90 Tablet, Refills: 3      acetaminophen (TYLENOL) 650 mg TbER Take 1 Tablet by mouth three (3) times daily as needed for Pain. Qty: 60 Tablet, Refills: 1    Associated Diagnoses: Pain in both feet      Blood-Glucose Meter monitoring kit Check sugar once daily fasting. E11.9  Qty: 1 Kit, Refills: 0    Associated Diagnoses: Controlled type 2 diabetes mellitus without complication, without long-term current use of insulin ([facility])      alcohol swabs padm 1 Each by Apply Externally route daily. For checking sugar  Qty: 100 Pad, Refills: 11      glucose blood VI test strips (OneTouch Verio test strips) strip USE TO CHECK BLOOD SUGAR ONCE DAILY FASTING  Qty: 100 Strip, Refills: 11      flash glucose scanning reader (FreeStyle Clary 14 Day [place]) misc 1 Each by Does Not Apply route Once every 2 weeks. Qty: 1 Each, Refills: 0    Comments: E11.9      flash glucose sensor (FreeStyle Clary 14 Day Sensor) kit 1 Each by Does Not Apply route Once every 2 weeks. Qty: 6 Kit, Refills: 11    Comments: E11.9      lancets misc Use as directed.  Dx: E11.9 Check sugar once daily fasting  Qty: 1 Each, Refills: 11           STOP taking these medications       cephALEXin (KEFLEX) 500 mg capsule Comments:   Reason for Stopping:            per medical continuation form      -Follow up with outpatient wound care. Signed:  Deonte Rogers  MSN, APRN, FNP-C, [facility]  Surgical Nurse Practitioner    10/17/2022  12:31 PM

## 2022-10-17 NOTE — PROGRESS NOTES
Transition of Care Plan:     RUR: 8  LALO: 10/17? Disposition: Plan Home once stable with ROBIN with [address]. 3:32 PM   [facility] stated that they just got Emretawanda Jaimie in. Tatyana Walker and will deliver tomorrow. Just need Low Air Loss Mattress!!! Referrals are still pending for mattress and   [facility]: pending: checking insurance verification. [employer]: They do not carry mattress/tristan lift. [facility] [employer]: Pending  [employer]: Pending  [facility]: Pending  CM sent another update via [employer]. CM called and left  for Son, Luiz Raygoza to provide update. MD reported that Pt needs to have bed and low air loss mattress before we can transition home. 12:52 PM   [address]: Out of Stock of [employer] Loss and [employer]: Not a provider with this insurance. So, ALEKSEY let Surgery NP know barriers. CM sending out more referrals to DME company:   [facility]: Pending  [facility]: Pending  [address]: Pending  [employer]: Pending  [employer]: Pending    10:39 AM  CM received notice from [facility] that Hospital Bed to be delivered today. [facility] responded that they do not have a Low Air Loss or Tristan Lift in stock. CM responded that I wanted to know ETA of hospital Bed delivery today from [facility] and that I was going to check with other providers to see if I can get a low air loss/Tristan lift from another provider. Referrals sent to [facility]- pending     8:34 AM  ALEKSEY completed chart review. [address] accepted and just needs to be notified prior to DC. DME- [facility]  Hospital bed to be delivered today. They indicated that Pt did not have stageable wound for Low Air Loss Mattress. 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 . Will see if that will qualify. They are currently out of stock of Emretawanda Jaimie.     CM sent them a messsage through [employer] to see if they have a delivery date for tristan lift or if we should check with other DME company. Follow up appointments: PCP: Mora Fairly:   DME needed: Needs [address] Bed and Low Air Loss Mattresss. - pending with [place]  Transportation at Discharge: BLS transport will need to be arranged  Keys or means to access home:   family     IM Medicare Letter: 1st IM Letter delivered; 10/12  2nd IM Letter will need to be delivered prior to DC. Is patient a  and connected with the [place]?            no    If yes, was [employer] transfer form completed and VA notified? Caregiver Contact:Chel Marinelli [phone]  FrederickQuinn:Son, [phone]  Discharge Caregiver contacted prior to discharge? No  Care Conference needed?:            no     CM will continue to monitor DC plan.       Lucy Desir, Chantal CM   Ext [phone]

## 2022-10-17 NOTE — PROGRESS NOTES
Problem: Falls - Risk of  Goal: *Absence of Falls  Description: Document Lettie Duverney Fall Risk and appropriate interventions in the flowsheet. Outcome: Progressing Towards Goal  Note: Fall Risk Interventions:  Mobility Interventions: Communicate number of staff needed for ambulation/transfer    Mentation Interventions: Bed/chair exit alarm, Door open when patient unattended, Reorient patient    Medication Interventions: Bed/chair exit alarm    Elimination Interventions: Call light in reach              Problem: Patient Education: Go to Patient Education Activity  Goal: Patient/Family Education  Outcome: Progressing Towards Goal     Problem: Pressure Injury - Risk of  Goal: *Prevention of pressure injury  Description: Document Darien Scale and appropriate interventions in the flowsheet.   Outcome: Progressing Towards Goal  Note: Pressure Injury Interventions:  Sensory Interventions: Assess changes in LOC, Float heels, Keep linens dry and wrinkle-free    Moisture Interventions: Absorbent underpads    Activity Interventions: PT/OT evaluation    Mobility Interventions: Float heels, HOB 30 degrees or less    Nutrition Interventions: Document food/fluid/supplement intake    Friction and Shear Interventions: HOB 30 degrees or less, Feet elevated on foot rest, Foam dressings/transparent film/skin sealants

## 2022-10-17 NOTE — DISCHARGE INSTRUCTIONS
Discharge Instructions: Follow-up with outpatient wound care. Wound Care  Sacral wound: daily, cleanse with NS moist 4x4's, apply Santyl ointment to wound base and pack wound with NS moist Kerlix/Bulkee II rolled gauze, cover with 4x4's, Optilock/ABD pads secured with tape    [place] to divert urine from healing wound    Low air loss mattress for stage IV sacral wound    Tristan for office appointments. Diet:  You may resume normal diet. Medications:  Resume home medications as indicated on the Medical Reconciliation form. Do not use blood thinners (such as Aspirin, Coumadin, or Plavix) until 3 days after surgery. Pain medications:  Non steroidal antiinflammatories (ibuprofen -- Advil or Motrin) seem to work best for post surgical pain. Try these first as prescribed. A narcotic prescription will also be given for breakthrough pain. PUT ICE ON YOUR INCISIONS 20 MINUTES EVERY HOUR WHILE THEY REMAIN SORE. PLACE A CLOTH BETWEEN YOUR SKIN THE THE ICE BAG. Colace or Miralax should be used twice daily to prevent constipation while on narcotics. If you are still having trouble having a BM after 1-2 days, try milk of magnesia. If this does not work within 24 hours, try a bottle of magnesium citrate. Narcotics and anesthesia sometimes cause nausea and vomiting. If persistent please call the office. Do not hesitate to call with questions or concerns.     Rustam Clarke MD  Tel [phone]  Fax [phone]

## 2022-10-18 VITALS
HEART RATE: 85 BPM | TEMPERATURE: 98.3 F | BODY MASS INDEX: 22.11 KG/M2 | DIASTOLIC BLOOD PRESSURE: 67 MMHG | RESPIRATION RATE: 18 BRPM | HEIGHT: 66 IN | OXYGEN SATURATION: 100 % | WEIGHT: 137.57 LBS | SYSTOLIC BLOOD PRESSURE: 132 MMHG

## 2022-10-18 PROCEDURE — 74011250637 HC RX REV CODE- 250/637: Performed by: SURGERY

## 2022-10-18 PROCEDURE — 74011250636 HC RX REV CODE- 250/636: Performed by: SURGERY

## 2022-10-18 RX ADMIN — COLLAGENASE SANTYL: 250 OINTMENT TOPICAL at 11:18

## 2022-10-18 RX ADMIN — ENOXAPARIN SODIUM 30 MG: 100 INJECTION SUBCUTANEOUS at 11:15

## 2022-10-18 RX ADMIN — AMLODIPINE BESYLATE 10 MG: 5 TABLET ORAL at 11:15

## 2022-10-18 RX ADMIN — POLYETHYLENE GLYCOL 3350 17 G: 17 POWDER, FOR SOLUTION ORAL at 11:15

## 2022-10-18 RX ADMIN — CLONIDINE HYDROCHLORIDE 0.2 MG: 0.1 TABLET ORAL at 11:16

## 2022-10-18 RX ADMIN — CASTOR OIL AND BALSAM, PERU: 788; 87 OINTMENT TOPICAL at 11:18

## 2022-10-18 RX ADMIN — METOPROLOL TARTRATE 25 MG: 25 TABLET, FILM COATED ORAL at 11:15

## 2022-10-18 NOTE — PROGRESS NOTES
End of Shift Note    Bedside shift change report given to Bela Clarke RN (oncoming nurse) by Nakita Martinez RN (offgoing nurse). Report included the following information SBAR, Kardex, Intake/Output, and MAR    Shift worked:  7P-7A     Shift summary and any significant changes:     Patient rested well overnight. No complaints.  Left for dialysis at change of shift this am.      Concerns for physician to address:  none     Zone phone for oncoming shift:   [address], RN

## 2022-10-18 NOTE — PROGRESS NOTES
Hospital follow-up PCP transitional care appointment has been scheduled with NP Larry Ortega. Hans on 10/21/22 at 1100. Pending patient discharge.   [place] Care, Care Management Assistant

## 2022-10-18 NOTE — PROGRESS NOTES
Attempted to call bob Batista, no answer. Called at [phone], [phone]. Attempted to call again katelynn 1815, no answer. no rashes, no jaundice present, good turgor

## 2022-10-18 NOTE — PROGRESS NOTES
Problem: Falls - Risk of  Goal: *Absence of Falls  Description: Document Urban Lafleur Fall Risk and appropriate interventions in the flowsheet. Outcome: Progressing Towards Goal  Note: Fall Risk Interventions:  Mobility Interventions: PT Consult for mobility concerns, PT Consult for assist device competence, Patient to call before getting OOB, Bed/chair exit alarm    Mentation Interventions: Bed/chair exit alarm, Adequate sleep, hydration, pain control, Familiar objects from home, Room close to nurse's station, Update white board    Medication Interventions: Patient to call before getting OOB, Bed/chair exit alarm    Elimination Interventions: Bed/chair exit alarm, Toileting schedule/hourly rounds              Problem: Patient Education: Go to Patient Education Activity  Goal: Patient/Family Education  Outcome: Progressing Towards Goal     Problem: Pressure Injury - Risk of  Goal: *Prevention of pressure injury  Description: Document Darien Scale and appropriate interventions in the flowsheet.   Outcome: Progressing Towards Goal  Note: Pressure Injury Interventions:  Sensory Interventions: Assess changes in LOC, Float heels, Avoid rigorous massage over bony prominences, Monitor skin under medical devices    Moisture Interventions: Absorbent underpads, Apply protective barrier, creams and emollients    Activity Interventions: Increase time out of bed    Mobility Interventions: Float heels, PT/OT evaluation    Nutrition Interventions: Document food/fluid/supplement intake    Friction and Shear Interventions: Feet elevated on foot rest, Transferring/repositioning devices, Foam dressings/transparent film/skin sealants, Apply protective barrier, creams and emollients                Problem: Patient Education: Go to Patient Education Activity  Goal: Patient/Family Education  Outcome: Progressing Towards Goal

## 2022-10-18 NOTE — PROGRESS NOTES
End of Shift Note    Bedside shift change report given to Lindsay Soliz RN (oncoming nurse) by Brandon Castro RN (offgoing nurse). Report included the following information SBAR, Kardex, Intake/Output, and MAR    Shift worked:  7p-7a     Shift summary and any significant changes:     Patient slept well with no complaints.       Concerns for physician to address:  none     Zone phone for oncoming shift:   1121           Brandon Castro RN

## 2022-10-18 NOTE — PROGRESS NOTES
Problem: Falls - Risk of  Goal: *Absence of Falls  Description: Document Shireen Ground Fall Risk and appropriate interventions in the flowsheet.   Outcome: Progressing Towards Goal  Note: Fall Risk Interventions:  Mobility Interventions: Bed/chair exit alarm    Mentation Interventions: Bed/chair exit alarm    Medication Interventions: Bed/chair exit alarm    Elimination Interventions: Bed/chair exit alarm

## 2022-10-18 NOTE — PROGRESS NOTES
Transition of Care Plan:     RUR: 8  LALO: 10/18 6 pm    Hospital to Home arranged for 6 PM ! Disposition: Plan Home once stable with ROBIN with [address]. 4 PM   Son, Meme Robb called and indicated that Hospital Bed, Mattress and [employer] lift are being delivered to home now    Since we are in [place]. Priyanka Houser setting up hospital to home. [phone]. Priyanka Houser ETA is 6 PM     Sent update to [facility] via [employer]. No further CM needs. 12:10 PM   CM participated in rounds. MD felt that once hospital bed is delivered that Pt could DC home. [employer] called and sent message that Jelena Story has a Low Air Loss Mattress and they are planning on delivering sometime today. CM called Pt son and had to leave a VM to see if they have an ETA for DME delivery. No other agencies have responded that they have a mattress. [facility] on Will Call for DC to home today once we know that hospital bed is at home. DC order is in.      8:26 AM  CM called [employer] [phone] and she indicated that the Hospital Bed and [address] should be delivered today. Just need Low Air Loss Mattress!!! Referrals are still pending for mattress and   [facility]:Declined. Do not have in stock. [employer]: They do not carry mattress/vashti lift. [facility]: Out of Stock of Low Air Loss and [employer]: Not a provider with this insurance  [employer] Story: Pending: [employer] is checking to see if this sister company has a CHAU Mattress. [employer]: Pending  -Called [phone] at 8:38 AM this morning and they do not open until 9 AM  [employer]: Pending   - Called [phone] and verified that they do not stock.      EXPANDED SEARCH TO ALL OF BISHOP:   [place]: Pending  [facility]: Pending  [facility]: Pending  [employer]: Pending  [employer]: Pending  [employer]: Pending  [employer]: Pending  [employer]: Pending  [employer]: Pending  [facility]: Pending  [employer]: Pending  [facility]: Pending    CM attempted to call family to see if they could call us when DME delivered. . had to leave a VM. MD reported that Pt needs to have bed and low air loss mattress before we can transition home. Follow up appointments: PCP: Ricardo Ching:   DME needed: Needs [address] Bed and Low Air Loss Mattresss. - pending with [facility]  Transportation at Discharge: BLS transport will need to be arranged  Keys or means to access home:   family     IM Medicare Letter: 1st IM Letter delivered; 10/12  2nd IM Letter delivered 10/17  Is patient a  and connected with the [place]?            no    If yes, was [employer] transfer form completed and VA notified? Caregiver Contact:Gisell Marinelli [phone]  Svitlana Ferrari [address] [place] [address], [phone]  Discharge Caregiver contacted prior to discharge? 8:26 AM: CM called and had to leave VM for Son.    8:35 AM: CM called [address] needed?:            no     CM will continue to monitor DC plan.       Court Ezekiel, Chantal CM   Ext [phone]

## 2022-10-18 NOTE — PROGRESS NOTES
Attempted several times to call son to confirm discharge instructions, but no answer. Discharge instructions sent with [facility] to give to her son. Supplies for wound care sent with [facility] to give her son. No IV to remove. Pt discharged with garner for wound healing. At the time of discharge, pt was clean and dry. No c/o pain or distress. No n/v/d. Patient discharged via stretcher with [facility] to home.

## 2022-10-19 ENCOUNTER — PATIENT OUTREACH (OUTPATIENT)
Dept: CASE MANAGEMENT | Age: 77
End: 2022-10-19

## 2022-10-19 NOTE — PROGRESS NOTES
Care Transitions Initial Call    Call within 2 business days of discharge: Yes     Patient: Epes Situ Patient : 1945 MRN: [id number]    Last Discharge [address]       Date Complaint Diagnosis Description Type Department Provider    10/12/22  Wound of sacral region, subsequent encounter [S31.000D (ICD-10-CM)] Admission (Discharged) Quoc Franks MD            Was this an external facility discharge? No Discharge Facility: 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 Has hospital follow up on 10/21/22,  wound care op clinic appt on 10/31 at 0900. Patient also has a garner. Method of communication with provider : chart routing    Discussed [id number] related testing which was not done at this time. Test results were not done. Patient informed of results, if available? Not done     Advance Care Planning:   Does patient have an Advance Directive: not on file. Inpatient Readmission Risk score: Unplanned Readmit Risk Score: 4.7    Was this a readmission? no   Patient stated reason for the admission: sacral wound    Patients top risk factors for readmission: functional physical ability, medical condition-stage IV sacral decubitus, and transportation   Interventions to address risk factors: Scheduled appointment with PCP-10/21/22, Scheduled appointment with [facility] care follow up in 2 weeks, and Obtained and reviewed discharge summary and/or continuity of care documents    Care Transition Nurse (CTN) contacted the family by telephone to perform post hospital discharge assessment. Verified name and  with family as identifiers. Provided introduction to self, and explanation of the CTN role. CTN reviewed discharge instructions, medical action plan and red flags with family who verbalized understanding.  Were discharge instructions available to patient? yes. Reviewed appropriate site of care based on symptoms and resources available to patient including: PCP, Specialist, and Home Health. Family given an opportunity to ask questions and does not have any further questions or concerns at this time. The family agrees to contact the PCP office for questions related to their healthcare. Medication reconciliation was performed with family, who verbalizes understanding of administration of home medications. Advised obtaining a 90-day supply of all daily and as-needed medications. Referral to Pharm D needed: no     Home Health/Outpatient orders at discharge: home health care and [address]: welcome home care  Date of initial visit: 10/19/22    Durable Medical Equipment ordered at discharge: Hospital Bed and vashti lift, low air [address] received: 10/18/22    Was patient discharged with a pulse oximeter? no    Discussed follow-up appointments. If no appointment was previously scheduled, appointment scheduling offered: yes. Is follow up appointment scheduled within 7 days of discharge? yes. [facility][1] follow up appointment(s):   Future Appointments   Date Time Provider Makenzie Arroyo   10/21/2022 11:00 AM [facility]   10/31/2022  9:00 AM Violet Rojas MD [facility]   11/17/2022  9:20 AM Tricia Macias MD [address]     Non-[facility] follow up appointment(s): none at this time    Plan for follow-up call in 5-7 days based on severity of symptoms and risk factors. Plan for next call: follow up appointment-wound care follow up 2 weeks and community resources-home health 3 times a week for wound care  CTN provided contact information for future needs. Goals Addressed                   This Visit's Progress     Prevent complications post hospitalization.         10/19/22    Patient family to make sure that patient is offloading sacral wound and relieving pressure  Patient to increase protein with beans, lean meats to facilitate wound healing. Patient to attend pcp virtual appt on 10/21/22  Patient family to learn to change wound dressing with santyl, normal saline packing and 4x4 ad abd. Family to clean garner daily with soap and water  Family to monitor urine for clarity, smell, and color. Patient to push po fluids to ensure clear, yellow urine. Ctn to follow up in one week. Wong Pryor RN             10/20/22  Dr. [facility] office willing to send orders to [facility] for garner care and changes if needed. Garner will be in for several weeks while wound is healing. Wound care OP clinic appt made for 10/31/22 at [facility] and message left with son.     Wong Pryor RN, CPN - Care Transition Nurse- [phone]

## 2022-10-20 ENCOUNTER — TELEPHONE (OUTPATIENT)
Dept: SURGERY | Age: 77
End: 2022-10-20

## 2022-10-20 NOTE — TELEPHONE ENCOUNTER
[facility] transition care called needs to know how long foly will be in     [phone]

## 2022-11-03 ENCOUNTER — PATIENT OUTREACH (OUTPATIENT)
Dept: CASE MANAGEMENT | Age: 77
End: 2022-11-03

## 2022-11-04 NOTE — PROGRESS NOTES
Care Transitions Outreach Attempt    Call within 2 business days of discharge: Yes   Attempted to reach patient for transitions of care follow up. Unable to reach patient. Called son that doesn't live near patient who had limited information. Called son that lives with patient and       Patient: Farida Villalpando Patient : 1945 MRN: [id number]    Last Discharge [address]       Date Complaint Diagnosis Description Type Department Provider    10/12/22  Wound of sacral region, subsequent encounter [S31.000D (ICD-10-CM)] Admission (Discharged) Ina Blake MD              Was this an external facility discharge?  No Discharge Facility: [facility]      Noted following upcoming appointments from discharge chart review:   [facility] follow up appointment(s):   Future Appointments   Date Time Provider Makenzie Arroyo   2022  9:20 AM Kevin Chavarria MD [facility]     Non-BS follow up appointment(s): missed 10/31/22 wound care appt at wound care clinic

## 2022-11-17 ENCOUNTER — PATIENT OUTREACH (OUTPATIENT)
Dept: CASE MANAGEMENT | Age: 77
End: 2022-11-17

## 2022-11-17 NOTE — PROGRESS NOTES
Patient has graduated from the Transitions of Care Coordination  program on 11/17/22. Patient/family has the ability to self-manage at this time Care management goals have been completed. Patient was not referred to the [facility] team for further management. Goals Addressed                   This Visit's Progress     COMPLETED: Prevent complications post hospitalization. 10/19/22    Patient family to make sure that patient is offloading sacral wound and relieving pressure  Patient to increase protein with beans, lean meats to facilitate wound healing. Patient to attend pcp virtual appt on 10/21/22  Patient family to learn to change wound dressing with santyl, normal saline packing and 4x4 ad abd. Family to clean garner daily with soap and water  Family to monitor urine for clarity, smell, and color. Patient to push po fluids to ensure clear, yellow urine. Ctn to follow up in one week. oWng Pryor RN      11/3/22  Patient [email or web address]], messages left. Ctn will attempt outreach in  about one week. Wong Pryor RN    11/17/22  Patient has had no readmissions for 30 days. Wong Pryor RN              Patient has Care Transition Nurse's contact information for any further questions, concerns, or needs. Patients upcoming visits:  No future appointments.

## 2023-09-29 NOTE — Clinical Note
Hi,  Her cardio and hospital notes say to continue hydralazine but this has not been on her med list for some time- she is not taking it- could probably benefit as her BP is still high. .. I just wanted to run it by you in case you had another reason why she may not be on it. .  Thanks!   Keren Galicia Kasey Rhodes (:  1976) is a 50 y.o. female,Established patient, here for evaluation of the following chief complaint(s):  Establish Care         ASSESSMENT/PLAN:  1. Need for influenza vaccination  -     Influenza, FLUCELVAX, (age 10 mo+), IM, Preservative Free, 0.5 mL      No follow-ups on file. Subjective   SUBJECTIVE/OBJECTIVE:  HPI    Review of Systems       Objective   Physical Exam             An electronic signature was used to authenticate this note.     --Antwan Lozoya

## 2025-04-10 NOTE — TELEPHONE ENCOUNTER
Care endorsed to Karoline WALSH   Informed Ms. Genny Betancur of provider's response, Ms. Chaz Ottoniel verbalized understanding.      Appt has been rescheduled for 5/18/21 @10:45AM    Please send meter

## (undated) DEVICE — HYPODERMIC SAFETY NEEDLE: Brand: MAGELLAN

## (undated) DEVICE — KIT,1200CC CANISTER,3/16"X6' TUBING: Brand: MEDLINE INDUSTRIES, INC.

## (undated) DEVICE — SPONGE GZ W4XL4IN COT 12 PLY TYP VII WVN C FLD DSGN

## (undated) DEVICE — SPONGE GZ W4XL4IN COT RADPQ HIGHLY ABSRB

## (undated) DEVICE — GLOVE SURG 7 PF POLYMER COAT WHT STRL SIGN LTX ESSENTIAL LTX

## (undated) DEVICE — BANDAGE,GAUZE,CONFORMING,3"X75",STRL,LF: Brand: MEDLINE

## (undated) DEVICE — GLOVE SURG SZ 8 CRM LTX FREE POLYISOPRENE POLYMER BEAD ANTI

## (undated) DEVICE — PREMIUM WET SKIN PREP TRAY: Brand: MEDLINE INDUSTRIES, INC.

## (undated) DEVICE — GLOVE SURG SZ 85 CRM LTX FREE POLYISOPRENE POLYMER BEAD ANTI

## (undated) DEVICE — DRESSING,GAUZE,XEROFORM,CURAD,1"X8",ST: Brand: CURAD

## (undated) DEVICE — GOWN,SIRUS,NONRNF,SETINSLV,XL,20/CS: Brand: MEDLINE

## (undated) DEVICE — SWAB CULT LIQ STUART AGR AERB MOD IN BRK SGL RAYON TIP PLAS 220099] BECTON DICKINSON MICRO]

## (undated) DEVICE — GAUZE,SPONGE,FLUFF,6"X6.75",STRL,5/TRAY: Brand: MEDLINE

## (undated) DEVICE — GOWN,SIRUS,NONRNF,SETINSLV,2XL,18/CS: Brand: MEDLINE

## (undated) DEVICE — TUBING, SUCTION, 1/4" X 10', STRAIGHT: Brand: MEDLINE

## (undated) DEVICE — INTENDED FOR TISSUE SEPARATION, AND OTHER PROCEDURES THAT REQUIRE A SHARP SURGICAL BLADE TO PUNCTURE OR CUT.: Brand: BARD-PARKER ® CARBON RIB-BACK BLADES

## (undated) DEVICE — TUBE SET SONICONE SHARPVAC DISP (MIN ORDER 5)

## (undated) DEVICE — MAJOR LAPAROTOMY-MRMC: Brand: MEDLINE INDUSTRIES, INC.